# Patient Record
Sex: MALE | Race: WHITE | NOT HISPANIC OR LATINO | Employment: OTHER | URBAN - METROPOLITAN AREA
[De-identification: names, ages, dates, MRNs, and addresses within clinical notes are randomized per-mention and may not be internally consistent; named-entity substitution may affect disease eponyms.]

---

## 2017-02-20 ENCOUNTER — HISTORICAL (OUTPATIENT)
Dept: RADIOLOGY | Facility: HOSPITAL | Age: 72
End: 2017-02-20

## 2017-07-10 ENCOUNTER — HISTORICAL (OUTPATIENT)
Dept: LAB | Facility: HOSPITAL | Age: 72
End: 2017-07-10

## 2017-07-10 LAB
ALBUMIN SERPL-MCNC: 3.6 GM/DL (ref 3.4–5)
ALP SERPL-CCNC: 76 UNIT/L (ref 46–116)
ALT SERPL-CCNC: 33 UNIT/L (ref 12–78)
AST SERPL-CCNC: 19 UNIT/L (ref 15–37)
BILIRUB SERPL-MCNC: 0.9 MG/DL (ref 0.2–1)
BILIRUBIN DIRECT+TOT PNL SERPL-MCNC: 0.19 MG/DL (ref 0–0.2)
BILIRUBIN DIRECT+TOT PNL SERPL-MCNC: 0.71 MG/DL (ref 0–0.8)
CHOLEST SERPL-MCNC: 122 MG/DL (ref 0–200)
CHOLEST/HDLC SERPL: 3.8 {RATIO} (ref 0–5)
HDLC SERPL-MCNC: 32 MG/DL (ref 40–60)
LDLC SERPL CALC-MCNC: 52 MG/DL (ref 0–129)
PROT SERPL-MCNC: 7 GM/DL (ref 6.4–8.2)
TRIGL SERPL-MCNC: 192 MG/DL
VLDLC SERPL CALC-MCNC: 38 MG/DL

## 2017-08-28 ENCOUNTER — HISTORICAL (OUTPATIENT)
Dept: LAB | Facility: HOSPITAL | Age: 72
End: 2017-08-28

## 2017-08-28 LAB — PSA SERPL-MCNC: 0.46 NG/ML (ref 0–4)

## 2017-09-08 ENCOUNTER — HISTORICAL (OUTPATIENT)
Dept: RADIOLOGY | Facility: HOSPITAL | Age: 72
End: 2017-09-08

## 2017-11-02 ENCOUNTER — HISTORICAL (OUTPATIENT)
Dept: LAB | Facility: HOSPITAL | Age: 72
End: 2017-11-02

## 2017-11-10 ENCOUNTER — HISTORICAL (OUTPATIENT)
Dept: CARDIOLOGY | Facility: HOSPITAL | Age: 72
End: 2017-11-10

## 2017-11-13 ENCOUNTER — HISTORICAL (OUTPATIENT)
Dept: RESPIRATORY THERAPY | Facility: HOSPITAL | Age: 72
End: 2017-11-13

## 2017-11-20 ENCOUNTER — HISTORICAL (OUTPATIENT)
Dept: LAB | Facility: HOSPITAL | Age: 72
End: 2017-11-20

## 2017-11-20 LAB
ALBUMIN SERPL-MCNC: 3.8 GM/DL (ref 3.4–5)
ALP SERPL-CCNC: 69 UNIT/L (ref 46–116)
ALT SERPL-CCNC: 32 UNIT/L (ref 12–78)
AST SERPL-CCNC: 15 UNIT/L (ref 15–37)
BILIRUB SERPL-MCNC: 1.5 MG/DL (ref 0.2–1)
BILIRUBIN DIRECT+TOT PNL SERPL-MCNC: 0.28 MG/DL (ref 0–0.2)
BILIRUBIN DIRECT+TOT PNL SERPL-MCNC: 1.25 MG/DL (ref 0–0.8)
BUN SERPL-MCNC: 13.6 MG/DL (ref 7–18)
CALCIUM SERPL-MCNC: 9.5 MG/DL (ref 8.5–10.1)
CHLORIDE SERPL-SCNC: 102 MMOL/L (ref 98–107)
CHOLEST SERPL-MCNC: 121 MG/DL (ref 0–200)
CHOLEST/HDLC SERPL: 3.2 {RATIO} (ref 0–5)
CO2 SERPL-SCNC: 30.2 MMOL/L (ref 21–32)
CREAT SERPL-MCNC: 0.65 MG/DL (ref 0.6–1.3)
ERYTHROCYTE [DISTWIDTH] IN BLOOD BY AUTOMATED COUNT: 13.8 % (ref 11.5–17)
FT4I SERPL CALC-MCNC: 2.94
GLUCOSE SERPL-MCNC: 117 MG/DL (ref 74–106)
HCT VFR BLD AUTO: 46.2 % (ref 42–52)
HDLC SERPL-MCNC: 38 MG/DL (ref 40–60)
HGB BLD-MCNC: 15.2 GM/DL (ref 14–18)
LDLC SERPL CALC-MCNC: 35 MG/DL (ref 0–129)
MCH RBC QN AUTO: 31.7 PG (ref 27–31)
MCHC RBC AUTO-ENTMCNC: 32.8 GM/DL (ref 33–36)
MCV RBC AUTO: 96.9 FL (ref 80–94)
PLATELET # BLD AUTO: 179 X10(3)/MCL (ref 130–400)
PMV BLD AUTO: 7.1 FL (ref 7.4–10.4)
POTASSIUM SERPL-SCNC: 4.4 MMOL/L (ref 3.5–5.1)
PROT SERPL-MCNC: 7.1 GM/DL (ref 6.4–8.2)
RBC # BLD AUTO: 4.77 X10(6)/MCL (ref 4.7–6.1)
SODIUM SERPL-SCNC: 140 MMOL/L (ref 136–145)
T3RU NFR SERPL: 33 % (ref 31–39)
T4 SERPL-MCNC: 8.9 MCG/DL (ref 4.7–13.3)
TRIGL SERPL-MCNC: 240 MG/DL
TSH SERPL-ACNC: 5.19 MIU/ML (ref 0.36–3.74)
VLDLC SERPL CALC-MCNC: 48 MG/DL
WBC # SPEC AUTO: 7.9 X10(3)/MCL (ref 4.5–11.5)

## 2017-12-12 ENCOUNTER — HISTORICAL (OUTPATIENT)
Dept: LAB | Facility: HOSPITAL | Age: 72
End: 2017-12-12

## 2017-12-12 LAB
BUN SERPL-MCNC: 12.5 MG/DL (ref 7–18)
CALCIUM SERPL-MCNC: 8.8 MG/DL (ref 8.5–10.1)
CHLORIDE SERPL-SCNC: 103 MMOL/L (ref 98–107)
CO2 SERPL-SCNC: 31.7 MMOL/L (ref 21–32)
CREAT SERPL-MCNC: 0.68 MG/DL (ref 0.6–1.3)
GLUCOSE SERPL-MCNC: 105 MG/DL (ref 74–106)
POTASSIUM SERPL-SCNC: 4.4 MMOL/L (ref 3.5–5.1)
SODIUM SERPL-SCNC: 142 MMOL/L (ref 136–145)

## 2018-03-07 ENCOUNTER — HISTORICAL (OUTPATIENT)
Dept: LAB | Facility: HOSPITAL | Age: 73
End: 2018-03-07

## 2018-03-07 LAB
ALBUMIN SERPL-MCNC: 3.7 GM/DL (ref 3.4–5)
ALP SERPL-CCNC: 72 UNIT/L (ref 46–116)
ALT SERPL-CCNC: 27 UNIT/L (ref 12–78)
AST SERPL-CCNC: 15 UNIT/L (ref 15–37)
BILIRUB SERPL-MCNC: 1.3 MG/DL (ref 0.2–1)
BILIRUBIN DIRECT+TOT PNL SERPL-MCNC: 0.26 MG/DL (ref 0–0.2)
BILIRUBIN DIRECT+TOT PNL SERPL-MCNC: 1.06 MG/DL (ref 0–0.8)
CHOLEST SERPL-MCNC: 111 MG/DL (ref 0–200)
CHOLEST/HDLC SERPL: 3.4 {RATIO} (ref 0–5)
HDLC SERPL-MCNC: 33 MG/DL (ref 40–60)
LDLC SERPL CALC-MCNC: 35 MG/DL (ref 0–129)
PROT SERPL-MCNC: 7 GM/DL (ref 6.4–8.2)
TRIGL SERPL-MCNC: 214 MG/DL
VLDLC SERPL CALC-MCNC: 43 MG/DL

## 2019-06-18 ENCOUNTER — HISTORICAL (OUTPATIENT)
Dept: LAB | Facility: HOSPITAL | Age: 74
End: 2019-06-18

## 2019-06-18 LAB
ALBUMIN SERPL-MCNC: 3.5 GM/DL (ref 3.4–5)
ALP SERPL-CCNC: 75 UNIT/L (ref 46–116)
ALT SERPL-CCNC: 28 UNIT/L (ref 12–78)
AST SERPL-CCNC: 16 UNIT/L (ref 15–37)
BILIRUB SERPL-MCNC: 1.3 MG/DL (ref 0.2–1)
BILIRUBIN DIRECT+TOT PNL SERPL-MCNC: 0.24 MG/DL (ref 0–0.2)
BILIRUBIN DIRECT+TOT PNL SERPL-MCNC: 1.06 MG/DL (ref 0–0.8)
BUN SERPL-MCNC: 13.8 MG/DL (ref 7–18)
CALCIUM SERPL-MCNC: 8.9 MG/DL (ref 8.5–10.1)
CHLORIDE SERPL-SCNC: 104 MMOL/L (ref 98–107)
CHOLEST SERPL-MCNC: 118 MG/DL (ref 0–200)
CHOLEST/HDLC SERPL: 3.9 {RATIO} (ref 0–5)
CO2 SERPL-SCNC: 32.1 MMOL/L (ref 21–32)
CREAT SERPL-MCNC: 0.69 MG/DL (ref 0.6–1.3)
CREAT/UREA NIT SERPL: 20
ERYTHROCYTE [DISTWIDTH] IN BLOOD BY AUTOMATED COUNT: 13.6 % (ref 11.5–17)
GLUCOSE SERPL-MCNC: 114 MG/DL (ref 74–106)
HCT VFR BLD AUTO: 43.7 % (ref 42–52)
HDLC SERPL-MCNC: 30 MG/DL (ref 40–60)
HGB BLD-MCNC: 15 GM/DL (ref 14–18)
LDLC SERPL CALC-MCNC: 36 MG/DL (ref 0–129)
MCH RBC QN AUTO: 32.2 PG (ref 27–31)
MCHC RBC AUTO-ENTMCNC: 34.3 GM/DL (ref 33–36)
MCV RBC AUTO: 93.8 FL (ref 80–94)
PLATELET # BLD AUTO: 164 X10(3)/MCL (ref 130–400)
PMV BLD AUTO: 8.8 FL (ref 9.4–12.4)
POTASSIUM SERPL-SCNC: 4.2 MMOL/L (ref 3.5–5.1)
PROT SERPL-MCNC: 6.9 GM/DL (ref 6.4–8.2)
RBC # BLD AUTO: 4.66 X10(6)/MCL (ref 4.7–6.1)
SODIUM SERPL-SCNC: 142 MMOL/L (ref 136–145)
TRIGL SERPL-MCNC: 262 MG/DL
VLDLC SERPL CALC-MCNC: 52 MG/DL
WBC # SPEC AUTO: 6.6 X10(3)/MCL (ref 4.5–11.5)

## 2019-09-18 ENCOUNTER — HISTORICAL (OUTPATIENT)
Dept: RADIOLOGY | Facility: HOSPITAL | Age: 74
End: 2019-09-18

## 2019-12-16 ENCOUNTER — HISTORICAL (OUTPATIENT)
Dept: LAB | Facility: HOSPITAL | Age: 74
End: 2019-12-16

## 2019-12-16 LAB
ABS NEUT (OLG): 5.49 X10(3)/MCL (ref 2.1–9.2)
ALBUMIN SERPL-MCNC: 3.7 GM/DL (ref 3.4–5)
ALP SERPL-CCNC: 78 UNIT/L (ref 46–116)
ALT SERPL-CCNC: 30 UNIT/L (ref 12–78)
AST SERPL-CCNC: 19 UNIT/L (ref 15–37)
BASOPHILS # BLD AUTO: 0.1 X10(3)/MCL (ref 0–0.2)
BASOPHILS NFR BLD AUTO: 1 %
BILIRUB SERPL-MCNC: 1.5 MG/DL (ref 0.2–1)
BILIRUBIN DIRECT+TOT PNL SERPL-MCNC: 0.32 MG/DL (ref 0–0.2)
BILIRUBIN DIRECT+TOT PNL SERPL-MCNC: 1.18 MG/DL (ref 0–0.8)
BUN SERPL-MCNC: 12.6 MG/DL (ref 7–18)
CALCIUM SERPL-MCNC: 8.3 MG/DL (ref 8.5–10.1)
CHLORIDE SERPL-SCNC: 106 MMOL/L (ref 98–107)
CHOLEST SERPL-MCNC: 115 MG/DL (ref 0–200)
CHOLEST/HDLC SERPL: 4.1 {RATIO} (ref 0–5)
CO2 SERPL-SCNC: 31 MMOL/L (ref 21–32)
CREAT SERPL-MCNC: 0.71 MG/DL (ref 0.6–1.3)
CREAT/UREA NIT SERPL: 18
EOSINOPHIL # BLD AUTO: 0.2 X10(3)/MCL (ref 0–0.9)
EOSINOPHIL NFR BLD AUTO: 2 %
ERYTHROCYTE [DISTWIDTH] IN BLOOD BY AUTOMATED COUNT: 13.5 % (ref 11.5–17)
EST. AVERAGE GLUCOSE BLD GHB EST-MCNC: 114 MG/DL
FT4I SERPL CALC-MCNC: 2.82
GLUCOSE SERPL-MCNC: 117 MG/DL (ref 74–106)
HBA1C MFR BLD: 5.6 % (ref 4.5–6.2)
HCT VFR BLD AUTO: 47.3 % (ref 42–52)
HDLC SERPL-MCNC: 28 MG/DL (ref 40–60)
HGB BLD-MCNC: 15.3 GM/DL (ref 14–18)
IMM GRANULOCYTES # BLD AUTO: 0.01 % (ref 0–0.02)
IMM GRANULOCYTES NFR BLD AUTO: 0.1 % (ref 0–0.43)
LDLC SERPL CALC-MCNC: 39 MG/DL (ref 0–129)
LYMPHOCYTES # BLD AUTO: 1.7 X10(3)/MCL (ref 0.6–4.6)
LYMPHOCYTES NFR BLD AUTO: 21 %
MCH RBC QN AUTO: 31.9 PG (ref 27–31)
MCHC RBC AUTO-ENTMCNC: 32.3 GM/DL (ref 33–36)
MCV RBC AUTO: 98.7 FL (ref 80–94)
MONOCYTES # BLD AUTO: 0.6 X10(3)/MCL (ref 0.1–1.3)
MONOCYTES NFR BLD AUTO: 8 %
NEUTROPHILS # BLD AUTO: 5.49 X10(3)/MCL (ref 1.4–7.9)
NEUTROPHILS NFR BLD AUTO: 68 %
PLATELET # BLD AUTO: 180 X10(3)/MCL (ref 130–400)
PMV BLD AUTO: 9.2 FL (ref 9.4–12.4)
POTASSIUM SERPL-SCNC: 3.9 MMOL/L (ref 3.5–5.1)
PROT SERPL-MCNC: 6.9 GM/DL (ref 6.4–8.2)
PSA SERPL-MCNC: 0.6 NG/ML (ref 0–4)
RBC # BLD AUTO: 4.79 X10(6)/MCL (ref 4.7–6.1)
SODIUM SERPL-SCNC: 143 MMOL/L (ref 136–145)
T3RU NFR SERPL: 34 % (ref 31–39)
T4 SERPL-MCNC: 8.3 MCG/DL (ref 4.7–13.3)
TRIGL SERPL-MCNC: 240 MG/DL
TSH SERPL-ACNC: 6.29 MIU/ML (ref 0.36–3.74)
VLDLC SERPL CALC-MCNC: 48 MG/DL
WBC # SPEC AUTO: 8.1 X10(3)/MCL (ref 4.5–11.5)

## 2020-03-13 ENCOUNTER — HISTORICAL (OUTPATIENT)
Dept: LAB | Facility: HOSPITAL | Age: 75
End: 2020-03-13

## 2020-03-13 LAB
FT4I SERPL CALC-MCNC: 2.82
T3RU NFR SERPL: 32 % (ref 31–39)
T4 SERPL-MCNC: 8.8 MCG/DL (ref 4.7–13.3)
TSH SERPL-ACNC: 2.68 MIU/ML (ref 0.36–3.74)

## 2020-04-30 ENCOUNTER — HISTORICAL (OUTPATIENT)
Dept: ADMINISTRATIVE | Facility: HOSPITAL | Age: 75
End: 2020-04-30

## 2020-04-30 LAB
ABS NEUT (OLG): 4.96 X10(3)/MCL (ref 2.1–9.2)
BASOPHILS # BLD AUTO: 0 X10(3)/MCL (ref 0–0.2)
BASOPHILS NFR BLD AUTO: 1 %
BUN SERPL-MCNC: 11 MG/DL (ref 8.4–25.7)
CALCIUM SERPL-MCNC: 8.5 MG/DL (ref 8.8–10)
CHLORIDE SERPL-SCNC: 107 MMOL/L (ref 98–107)
CO2 SERPL-SCNC: 26 MMOL/L (ref 23–31)
CREAT SERPL-MCNC: 0.61 MG/DL (ref 0.73–1.18)
CREAT/UREA NIT SERPL: 18
EOSINOPHIL # BLD AUTO: 0.1 X10(3)/MCL (ref 0–0.9)
EOSINOPHIL NFR BLD AUTO: 2 %
ERYTHROCYTE [DISTWIDTH] IN BLOOD BY AUTOMATED COUNT: 14.3 % (ref 11.5–17)
GLUCOSE SERPL-MCNC: 116 MG/DL (ref 82–115)
HCT VFR BLD AUTO: 48.9 % (ref 42–52)
HGB BLD-MCNC: 15.3 GM/DL (ref 14–18)
INR PPP: 1.2 (ref 0–1.3)
LYMPHOCYTES # BLD AUTO: 1.3 X10(3)/MCL (ref 0.6–4.6)
LYMPHOCYTES NFR BLD AUTO: 18 %
MAGNESIUM SERPL-MCNC: 2.27 MG/DL (ref 1.6–2.6)
MCH RBC QN AUTO: 31.2 PG (ref 27–31)
MCHC RBC AUTO-ENTMCNC: 31.3 GM/DL (ref 33–36)
MCV RBC AUTO: 99.8 FL (ref 80–94)
MONOCYTES # BLD AUTO: 0.6 X10(3)/MCL (ref 0.1–1.3)
MONOCYTES NFR BLD AUTO: 9 %
NEUTROPHILS # BLD AUTO: 4.96 X10(3)/MCL (ref 2.1–9.2)
NEUTROPHILS NFR BLD AUTO: 70 %
PLATELET # BLD AUTO: 179 X10(3)/MCL (ref 130–400)
PMV BLD AUTO: 9.4 FL (ref 9.4–12.4)
POTASSIUM SERPL-SCNC: 4.1 MMOL/L (ref 3.5–5.1)
PROTHROMBIN TIME: 14.8 SECOND(S) (ref 11.1–13.7)
RBC # BLD AUTO: 4.9 X10(6)/MCL (ref 4.7–6.1)
SODIUM SERPL-SCNC: 139 MMOL/L (ref 136–145)
WBC # SPEC AUTO: 7.1 X10(3)/MCL (ref 4.5–11.5)

## 2020-06-16 ENCOUNTER — HISTORICAL (OUTPATIENT)
Dept: LAB | Facility: HOSPITAL | Age: 75
End: 2020-06-16

## 2020-06-16 LAB
ALBUMIN SERPL-MCNC: 3.6 GM/DL (ref 3.4–5)
ALP SERPL-CCNC: 61 UNIT/L (ref 46–116)
ALT SERPL-CCNC: 26 UNIT/L (ref 12–78)
AST SERPL-CCNC: 21 UNIT/L (ref 15–37)
BILIRUB SERPL-MCNC: 1.2 MG/DL (ref 0.2–1)
BILIRUBIN DIRECT+TOT PNL SERPL-MCNC: 0.23 MG/DL (ref 0–0.2)
BILIRUBIN DIRECT+TOT PNL SERPL-MCNC: 0.97 MG/DL (ref 0–0.8)
CHOLEST SERPL-MCNC: 112 MG/DL (ref 0–200)
CHOLEST/HDLC SERPL: 4.1 {RATIO} (ref 0–5)
ERYTHROCYTE [DISTWIDTH] IN BLOOD BY AUTOMATED COUNT: 13.8 % (ref 11.5–17)
HCT VFR BLD AUTO: 46.2 % (ref 42–52)
HDLC SERPL-MCNC: 27 MG/DL (ref 40–60)
HGB BLD-MCNC: 14.9 GM/DL (ref 14–18)
LDLC SERPL CALC-MCNC: 31 MG/DL (ref 0–129)
MCH RBC QN AUTO: 31.7 PG (ref 27–31)
MCHC RBC AUTO-ENTMCNC: 32.3 GM/DL (ref 33–36)
MCV RBC AUTO: 98.3 FL (ref 80–94)
PLATELET # BLD AUTO: 177 X10(3)/MCL (ref 130–400)
PMV BLD AUTO: 9.3 FL (ref 9.4–12.4)
PROT SERPL-MCNC: 7.2 GM/DL (ref 6.4–8.2)
RBC # BLD AUTO: 4.7 X10(6)/MCL (ref 4.7–6.1)
TRIGL SERPL-MCNC: 271 MG/DL
VLDLC SERPL CALC-MCNC: 54 MG/DL
WBC # SPEC AUTO: 7.2 X10(3)/MCL (ref 4.5–11.5)

## 2021-01-21 ENCOUNTER — HISTORICAL (OUTPATIENT)
Dept: LAB | Facility: HOSPITAL | Age: 76
End: 2021-01-21

## 2021-01-21 LAB
BUN SERPL-MCNC: 10.1 MG/DL (ref 8.4–25.7)
CALCIUM SERPL-MCNC: 8.4 MG/DL (ref 8.8–10)
CHLORIDE SERPL-SCNC: 99 MMOL/L (ref 98–107)
CO2 SERPL-SCNC: 25 MMOL/L (ref 23–31)
CREAT SERPL-MCNC: 0.7 MG/DL (ref 0.73–1.18)
CREAT/UREA NIT SERPL: 14
ERYTHROCYTE [DISTWIDTH] IN BLOOD BY AUTOMATED COUNT: 14.2 % (ref 11.5–17)
GLUCOSE SERPL-MCNC: 160 MG/DL (ref 82–115)
HCT VFR BLD AUTO: 47.9 % (ref 42–52)
HGB BLD-MCNC: 15.2 GM/DL (ref 14–18)
INR PPP: 1 (ref 0–1.3)
MAGNESIUM SERPL-MCNC: 2.1 MG/DL (ref 1.6–2.6)
MCH RBC QN AUTO: 32.8 PG (ref 27–31)
MCHC RBC AUTO-ENTMCNC: 31.7 GM/DL (ref 33–36)
MCV RBC AUTO: 103.2 FL (ref 80–94)
PLATELET # BLD AUTO: 198 X10(3)/MCL (ref 130–400)
PMV BLD AUTO: 9.8 FL (ref 9.4–12.4)
POTASSIUM SERPL-SCNC: 3.6 MMOL/L (ref 3.5–5.1)
PROTHROMBIN TIME: 12.9 SECOND(S) (ref 11.1–13.7)
RBC # BLD AUTO: 4.64 X10(6)/MCL (ref 4.7–6.1)
SODIUM SERPL-SCNC: 133 MMOL/L (ref 136–145)
WBC # SPEC AUTO: 6.9 X10(3)/MCL (ref 4.5–11.5)

## 2021-02-01 ENCOUNTER — HISTORICAL (OUTPATIENT)
Dept: MEDSURG UNIT | Facility: HOSPITAL | Age: 76
End: 2021-02-01

## 2021-02-01 LAB
BUN SERPL-MCNC: 11.2 MG/DL (ref 8.4–25.7)
CALCIUM SERPL-MCNC: 8.6 MG/DL (ref 8.8–10)
CHLORIDE SERPL-SCNC: 106 MMOL/L (ref 98–107)
CO2 SERPL-SCNC: 28 MMOL/L (ref 23–31)
CREAT SERPL-MCNC: 0.66 MG/DL (ref 0.73–1.18)
CREAT/UREA NIT SERPL: 17
GLUCOSE SERPL-MCNC: 115 MG/DL (ref 82–115)
POTASSIUM SERPL-SCNC: 4.3 MMOL/L (ref 3.5–5.1)
SODIUM SERPL-SCNC: 141 MMOL/L (ref 136–145)

## 2021-02-02 LAB
ABS NEUT (OLG): 10.12 X10(3)/MCL (ref 2.1–9.2)
ALBUMIN SERPL-MCNC: 3.3 GM/DL (ref 3.4–4.8)
ALBUMIN/GLOB SERPL: 1.2 RATIO (ref 1.1–2)
ALP SERPL-CCNC: 49 UNIT/L (ref 40–150)
ALT SERPL-CCNC: 15 UNIT/L (ref 0–55)
AST SERPL-CCNC: 25 UNIT/L (ref 5–34)
BASOPHILS # BLD AUTO: 0 X10(3)/MCL (ref 0–0.2)
BASOPHILS NFR BLD AUTO: 0 %
BILIRUB SERPL-MCNC: 1.6 MG/DL
BILIRUBIN DIRECT+TOT PNL SERPL-MCNC: 0.6 MG/DL (ref 0–0.5)
BILIRUBIN DIRECT+TOT PNL SERPL-MCNC: 1 MG/DL (ref 0–0.8)
BUN SERPL-MCNC: 13.3 MG/DL (ref 8.4–25.7)
CALCIUM SERPL-MCNC: 7.5 MG/DL (ref 8.8–10)
CHLORIDE SERPL-SCNC: 105 MMOL/L (ref 98–107)
CO2 SERPL-SCNC: 30 MMOL/L (ref 23–31)
CREAT SERPL-MCNC: 0.76 MG/DL (ref 0.73–1.18)
ERYTHROCYTE [DISTWIDTH] IN BLOOD BY AUTOMATED COUNT: 14.8 % (ref 11.5–17)
GLOBULIN SER-MCNC: 2.7 GM/DL (ref 2.4–3.5)
GLUCOSE SERPL-MCNC: 110 MG/DL (ref 82–115)
HCT VFR BLD AUTO: 42.4 % (ref 42–52)
HGB BLD-MCNC: 13.7 GM/DL (ref 14–18)
LYMPHOCYTES # BLD AUTO: 0.9 X10(3)/MCL (ref 0.6–4.6)
LYMPHOCYTES NFR BLD AUTO: 8 %
MCH RBC QN AUTO: 32.8 PG (ref 27–31)
MCHC RBC AUTO-ENTMCNC: 32.3 GM/DL (ref 33–36)
MCV RBC AUTO: 101.4 FL (ref 80–94)
MONOCYTES # BLD AUTO: 0.7 X10(3)/MCL (ref 0.1–1.3)
MONOCYTES NFR BLD AUTO: 6 %
NEUTROPHILS # BLD AUTO: 10.12 X10(3)/MCL (ref 2.1–9.2)
NEUTROPHILS NFR BLD AUTO: 86 %
PLATELET # BLD AUTO: 182 X10(3)/MCL (ref 130–400)
PMV BLD AUTO: 9.7 FL (ref 9.4–12.4)
POTASSIUM SERPL-SCNC: 4.4 MMOL/L (ref 3.5–5.1)
PROT SERPL-MCNC: 6 GM/DL (ref 5.8–7.6)
RBC # BLD AUTO: 4.18 X10(6)/MCL (ref 4.7–6.1)
SODIUM SERPL-SCNC: 142 MMOL/L (ref 136–145)
WBC # SPEC AUTO: 11.8 X10(3)/MCL (ref 4.5–11.5)

## 2021-02-22 ENCOUNTER — HISTORICAL (OUTPATIENT)
Dept: LAB | Facility: HOSPITAL | Age: 76
End: 2021-02-22

## 2021-02-22 LAB
BUN SERPL-MCNC: 12 MG/DL (ref 8.4–25.7)
CALCIUM SERPL-MCNC: 8.6 MG/DL (ref 8.8–10)
CHLORIDE SERPL-SCNC: 104 MMOL/L (ref 98–107)
CO2 SERPL-SCNC: 29 MMOL/L (ref 23–31)
CREAT SERPL-MCNC: 0.65 MG/DL (ref 0.73–1.18)
CREAT/UREA NIT SERPL: 18
ERYTHROCYTE [DISTWIDTH] IN BLOOD BY AUTOMATED COUNT: 13.7 % (ref 11.5–17)
GLUCOSE SERPL-MCNC: 113 MG/DL (ref 82–115)
HCT VFR BLD AUTO: 45.8 % (ref 42–52)
HGB BLD-MCNC: 14.5 GM/DL (ref 14–18)
MCH RBC QN AUTO: 32.4 PG (ref 27–31)
MCHC RBC AUTO-ENTMCNC: 31.7 GM/DL (ref 33–36)
MCV RBC AUTO: 102.2 FL (ref 80–94)
PLATELET # BLD AUTO: 159 X10(3)/MCL (ref 130–400)
PMV BLD AUTO: 9.3 FL (ref 9.4–12.4)
POTASSIUM SERPL-SCNC: 4.1 MMOL/L (ref 3.5–5.1)
RBC # BLD AUTO: 4.48 X10(6)/MCL (ref 4.7–6.1)
SODIUM SERPL-SCNC: 142 MMOL/L (ref 136–145)
WBC # SPEC AUTO: 6.9 X10(3)/MCL (ref 4.5–11.5)

## 2021-03-03 ENCOUNTER — HISTORICAL (OUTPATIENT)
Dept: LAB | Facility: HOSPITAL | Age: 76
End: 2021-03-03

## 2021-03-03 LAB
ABS NEUT (OLG): 5.25 X10(3)/MCL (ref 2.1–9.2)
ALBUMIN SERPL-MCNC: 3.6 GM/DL (ref 3.4–4.8)
ALP SERPL-CCNC: 65 UNIT/L (ref 40–150)
ALT SERPL-CCNC: 15 UNIT/L (ref 0–55)
AST SERPL-CCNC: 12 UNIT/L (ref 5–34)
BASOPHILS # BLD AUTO: 0 X10(3)/MCL (ref 0–0.2)
BASOPHILS NFR BLD AUTO: 0 %
BILIRUB SERPL-MCNC: 1.4 MG/DL
BILIRUBIN DIRECT+TOT PNL SERPL-MCNC: 0.4 MG/DL (ref 0–0.5)
BILIRUBIN DIRECT+TOT PNL SERPL-MCNC: 1 MG/DL (ref 0–0.8)
BUN SERPL-MCNC: 13.8 MG/DL (ref 8.4–25.7)
CALCIUM SERPL-MCNC: 8.9 MG/DL (ref 8.8–10)
CHLORIDE SERPL-SCNC: 104 MMOL/L (ref 98–107)
CHOLEST SERPL-MCNC: 91 MG/DL
CHOLEST/HDLC SERPL: 4 {RATIO} (ref 0–5)
CO2 SERPL-SCNC: 29 MMOL/L (ref 23–31)
CREAT SERPL-MCNC: 0.7 MG/DL (ref 0.73–1.18)
CREAT/UREA NIT SERPL: 20
EOSINOPHIL # BLD AUTO: 0.2 X10(3)/MCL (ref 0–0.9)
EOSINOPHIL NFR BLD AUTO: 3 %
ERYTHROCYTE [DISTWIDTH] IN BLOOD BY AUTOMATED COUNT: 14 % (ref 11.5–17)
FT4I SERPL CALC-MCNC: 2.47 (ref 2.6–3.6)
GLUCOSE SERPL-MCNC: 111 MG/DL (ref 82–115)
HCT VFR BLD AUTO: 44.8 % (ref 42–52)
HDLC SERPL-MCNC: 24 MG/DL (ref 35–60)
HGB BLD-MCNC: 13.8 GM/DL (ref 14–18)
IMM GRANULOCYTES # BLD AUTO: 0.01 % (ref 0–0.02)
IMM GRANULOCYTES NFR BLD AUTO: 0.1 % (ref 0–0.43)
LDLC SERPL CALC-MCNC: 32 MG/DL (ref 50–140)
LYMPHOCYTES # BLD AUTO: 1.2 X10(3)/MCL (ref 0.6–4.6)
LYMPHOCYTES NFR BLD AUTO: 16 %
MCH RBC QN AUTO: 32 PG (ref 27–31)
MCHC RBC AUTO-ENTMCNC: 30.8 GM/DL (ref 33–36)
MCV RBC AUTO: 103.9 FL (ref 80–94)
MONOCYTES # BLD AUTO: 0.6 X10(3)/MCL (ref 0.1–1.3)
MONOCYTES NFR BLD AUTO: 8 %
NEUTROPHILS # BLD AUTO: 5.25 X10(3)/MCL (ref 1.4–7.9)
NEUTROPHILS NFR BLD AUTO: 72 %
PLATELET # BLD AUTO: 166 X10(3)/MCL (ref 130–400)
PMV BLD AUTO: 9.4 FL (ref 9.4–12.4)
POTASSIUM SERPL-SCNC: 4.7 MMOL/L (ref 3.5–5.1)
PROT SERPL-MCNC: 6.6 GM/DL (ref 5.8–7.6)
RBC # BLD AUTO: 4.31 X10(6)/MCL (ref 4.7–6.1)
SODIUM SERPL-SCNC: 141 MMOL/L (ref 136–145)
T3RU NFR SERPL: 37 % (ref 31–39)
T4 SERPL-MCNC: 6.67 UG/DL (ref 4.87–11.72)
TRIGL SERPL-MCNC: 173 MG/DL (ref 34–140)
TSH SERPL-ACNC: 6.33 UIU/ML (ref 0.35–4.94)
VLDLC SERPL CALC-MCNC: 35 MG/DL
WBC # SPEC AUTO: 7.3 X10(3)/MCL (ref 4.5–11.5)

## 2021-04-19 ENCOUNTER — HISTORICAL (OUTPATIENT)
Dept: LAB | Facility: HOSPITAL | Age: 76
End: 2021-04-19

## 2021-04-19 LAB
ALBUMIN SERPL-MCNC: 3.7 GM/DL (ref 3.4–4.8)
ALP SERPL-CCNC: 64 UNIT/L (ref 40–150)
ALT SERPL-CCNC: 17 UNIT/L (ref 0–55)
AST SERPL-CCNC: 19 UNIT/L (ref 5–34)
BILIRUB SERPL-MCNC: 1.8 MG/DL
BILIRUBIN DIRECT+TOT PNL SERPL-MCNC: 0.7 MG/DL (ref 0–0.5)
BILIRUBIN DIRECT+TOT PNL SERPL-MCNC: 1.1 MG/DL (ref 0–0.8)
BUN SERPL-MCNC: 11.9 MG/DL (ref 8.4–25.7)
CALCIUM SERPL-MCNC: 9 MG/DL (ref 8.8–10)
CHLORIDE SERPL-SCNC: 107 MMOL/L (ref 98–107)
CHOLEST SERPL-MCNC: 91 MG/DL
CHOLEST/HDLC SERPL: 3 {RATIO} (ref 0–5)
CO2 SERPL-SCNC: 26 MMOL/L (ref 23–31)
CREAT SERPL-MCNC: 0.62 MG/DL (ref 0.73–1.18)
CREAT/UREA NIT SERPL: 19
GLUCOSE SERPL-MCNC: 136 MG/DL (ref 82–115)
HDLC SERPL-MCNC: 27 MG/DL (ref 35–60)
LDLC SERPL CALC-MCNC: 39 MG/DL (ref 50–140)
POTASSIUM SERPL-SCNC: 3.9 MMOL/L (ref 3.5–5.1)
PROT SERPL-MCNC: 6.8 GM/DL (ref 5.8–7.6)
SODIUM SERPL-SCNC: 142 MMOL/L (ref 136–145)
TRIGL SERPL-MCNC: 126 MG/DL (ref 34–140)
VLDLC SERPL CALC-MCNC: 25 MG/DL

## 2021-05-12 ENCOUNTER — HISTORICAL (OUTPATIENT)
Dept: LAB | Facility: HOSPITAL | Age: 76
End: 2021-05-12

## 2021-05-12 LAB
FT4I SERPL CALC-MCNC: 2.74 (ref 2.6–3.6)
T3RU NFR SERPL: 37.1 % (ref 31–39)
T4 SERPL-MCNC: 7.39 UG/DL (ref 4.87–11.72)
TSH SERPL-ACNC: 3.15 UIU/ML (ref 0.35–4.94)

## 2021-10-30 ENCOUNTER — HISTORICAL (OUTPATIENT)
Dept: LAB | Facility: HOSPITAL | Age: 76
End: 2021-10-30

## 2021-10-30 LAB
ALBUMIN SERPL-MCNC: 3.8 GM/DL (ref 3.4–4.8)
ALP SERPL-CCNC: 66 UNIT/L (ref 40–150)
ALT SERPL-CCNC: 19 UNIT/L (ref 0–55)
AST SERPL-CCNC: 16 UNIT/L (ref 5–34)
BILIRUB SERPL-MCNC: 1.9 MG/DL
BILIRUBIN DIRECT+TOT PNL SERPL-MCNC: 0.6 MG/DL (ref 0–0.5)
BILIRUBIN DIRECT+TOT PNL SERPL-MCNC: 1.3 MG/DL (ref 0–0.8)
CHOLEST SERPL-MCNC: 111 MG/DL
CHOLEST/HDLC SERPL: 4 {RATIO} (ref 0–5)
HDLC SERPL-MCNC: 27 MG/DL (ref 35–60)
LDLC SERPL CALC-MCNC: 36 MG/DL (ref 50–140)
PROT SERPL-MCNC: 6.7 GM/DL (ref 5.8–7.6)
TRIGL SERPL-MCNC: 239 MG/DL (ref 34–140)
VLDLC SERPL CALC-MCNC: 48 MG/DL

## 2021-11-19 ENCOUNTER — HISTORICAL (OUTPATIENT)
Dept: LAB | Facility: HOSPITAL | Age: 76
End: 2021-11-19

## 2021-11-19 LAB
FT4I SERPL CALC-MCNC: 3.44 (ref 2.6–3.6)
T3RU NFR SERPL: 43.48 % (ref 31–39)
T4 SERPL-MCNC: 7.91 UG/DL (ref 4.87–11.72)
TSH SERPL-ACNC: 3.48 UIU/ML (ref 0.35–4.94)

## 2021-12-05 ENCOUNTER — HOSPITAL ENCOUNTER (OUTPATIENT)
Dept: MEDSURG UNIT | Facility: HOSPITAL | Age: 76
End: 2021-12-07
Attending: INTERNAL MEDICINE | Admitting: INTERNAL MEDICINE

## 2021-12-05 LAB — LACTATE SERPL-SCNC: 1.4 MMOL/L (ref 0.5–2.2)

## 2021-12-06 LAB
ABS NEUT (OLG): 20.4 X10(3)/MCL (ref 2.1–9.2)
BASOPHILS # BLD AUTO: 0 X10(3)/MCL (ref 0–0.2)
BUN SERPL-MCNC: 13.6 MG/DL (ref 8.4–25.7)
CALCIUM SERPL-MCNC: 7.2 MG/DL (ref 8.7–10.5)
CHLORIDE SERPL-SCNC: 111 MMOL/L (ref 98–107)
CO2 SERPL-SCNC: 23 MMOL/L (ref 23–31)
CREAT SERPL-MCNC: 0.71 MG/DL (ref 0.73–1.18)
CREAT/UREA NIT SERPL: 19
ERYTHROCYTE [DISTWIDTH] IN BLOOD BY AUTOMATED COUNT: 13.9 % (ref 11.5–17)
GLUCOSE SERPL-MCNC: 147 MG/DL (ref 82–115)
HCT VFR BLD AUTO: 38.5 % (ref 42–52)
HGB BLD-MCNC: 12.2 GM/DL (ref 14–18)
IMM GRANULOCYTES # BLD AUTO: 0.42 % (ref 0–0.02)
IMM GRANULOCYTES NFR BLD AUTO: 1.9 % (ref 0–0.43)
LACTATE SERPL-SCNC: 1.8 MMOL/L (ref 0.5–2.2)
LYMPHOCYTES # BLD AUTO: 0.7 X10(3)/MCL (ref 0.6–4.6)
LYMPHOCYTES NFR BLD AUTO: 3 %
MCH RBC QN AUTO: 32.2 PG (ref 27–31)
MCHC RBC AUTO-ENTMCNC: 31.7 GM/DL (ref 33–36)
MCV RBC AUTO: 101.6 FL (ref 80–94)
MONOCYTES # BLD AUTO: 0.4 X10(3)/MCL (ref 0.1–1.3)
MONOCYTES NFR BLD AUTO: 2 %
NEUTROPHILS # BLD AUTO: 20.4 X10(3)/MCL (ref 1.4–7.9)
NEUTROPHILS NFR BLD AUTO: 93 %
PLATELET # BLD AUTO: 136 X10(3)/MCL (ref 130–400)
PMV BLD AUTO: 9.5 FL (ref 9.4–12.4)
POTASSIUM SERPL-SCNC: 4.4 MMOL/L (ref 3.5–5.1)
RBC # BLD AUTO: 3.79 X10(6)/MCL (ref 4.7–6.1)
SODIUM SERPL-SCNC: 141 MMOL/L (ref 136–145)
WBC # SPEC AUTO: 21.9 X10(3)/MCL (ref 4.5–11.5)

## 2021-12-07 LAB
ABS NEUT (OLG): 17.94 X10(3)/MCL (ref 2.1–9.2)
BASOPHILS # BLD AUTO: 0 X10(3)/MCL (ref 0–0.2)
BASOPHILS NFR BLD AUTO: 0 %
BNP BLD-MCNC: 100 PG/ML
BUN SERPL-MCNC: 12.1 MG/DL (ref 8.4–25.7)
CALCIUM SERPL-MCNC: 7.8 MG/DL (ref 8.7–10.5)
CHLORIDE SERPL-SCNC: 107 MMOL/L (ref 98–107)
CO2 SERPL-SCNC: 29 MMOL/L (ref 23–31)
CREAT SERPL-MCNC: 0.61 MG/DL (ref 0.73–1.18)
CREAT/UREA NIT SERPL: 20
EOSINOPHIL # BLD AUTO: 0 X10(3)/MCL (ref 0–0.9)
ERYTHROCYTE [DISTWIDTH] IN BLOOD BY AUTOMATED COUNT: 14.4 % (ref 11.5–17)
GLUCOSE SERPL-MCNC: 80 MG/DL (ref 82–115)
HCT VFR BLD AUTO: 35.9 % (ref 42–52)
HGB BLD-MCNC: 11.5 GM/DL (ref 14–18)
IMM GRANULOCYTES # BLD AUTO: 0.3 % (ref 0–0.02)
IMM GRANULOCYTES NFR BLD AUTO: 1.5 % (ref 0–0.43)
LYMPHOCYTES # BLD AUTO: 1.2 X10(3)/MCL (ref 0.6–4.6)
LYMPHOCYTES NFR BLD AUTO: 6 %
MCH RBC QN AUTO: 32.3 PG (ref 27–31)
MCHC RBC AUTO-ENTMCNC: 32 GM/DL (ref 33–36)
MCV RBC AUTO: 100.8 FL (ref 80–94)
MONOCYTES # BLD AUTO: 0.8 X10(3)/MCL (ref 0.1–1.3)
MONOCYTES NFR BLD AUTO: 4 %
NEUTROPHILS # BLD AUTO: 17.94 X10(3)/MCL (ref 1.4–7.9)
NEUTROPHILS NFR BLD AUTO: 89 %
PLATELET # BLD AUTO: 143 X10(3)/MCL (ref 130–400)
PMV BLD AUTO: 10.3 FL (ref 9.4–12.4)
POTASSIUM SERPL-SCNC: 3.7 MMOL/L (ref 3.5–5.1)
RBC # BLD AUTO: 3.56 X10(6)/MCL (ref 4.7–6.1)
SODIUM SERPL-SCNC: 140 MMOL/L (ref 136–145)
WBC # SPEC AUTO: 20.2 X10(3)/MCL (ref 4.5–11.5)

## 2021-12-09 LAB — EST CREAT CLEARANCE SER (OHS): 156.67 ML/MIN

## 2022-03-08 ENCOUNTER — HISTORICAL (OUTPATIENT)
Dept: LAB | Facility: HOSPITAL | Age: 77
End: 2022-03-08

## 2022-03-08 LAB
ABS NEUT (OLG): 5.46 (ref 2.1–9.2)
ALBUMIN SERPL-MCNC: 3.7 G/DL (ref 3.4–4.8)
ALP SERPL-CCNC: 61 U/L (ref 40–150)
ALT SERPL-CCNC: 16 U/L (ref 0–55)
AST SERPL-CCNC: 18 U/L (ref 5–34)
BASOPHILS # BLD AUTO: 0 10*3/UL (ref 0–0.2)
BASOPHILS NFR BLD AUTO: 1 %
BILIRUB SERPL-MCNC: 1.7 MG/DL
BILIRUBIN DIRECT+TOT PNL SERPL-MCNC: 0.5 (ref 0–0.5)
BILIRUBIN DIRECT+TOT PNL SERPL-MCNC: 1.2 (ref 0–0.8)
BUN SERPL-MCNC: 11.7 MG/DL (ref 8.4–25.7)
CALCIUM SERPL-MCNC: 9.1 MG/DL (ref 8.7–10.5)
CHLORIDE SERPL-SCNC: 105 MMOL/L (ref 98–107)
CHOLEST SERPL-MCNC: 106 MG/DL
CHOLEST/HDLC SERPL: 4 {RATIO} (ref 0–5)
CO2 SERPL-SCNC: 27 MMOL/L (ref 23–31)
CREAT SERPL-MCNC: 0.69 MG/DL (ref 0.73–1.18)
CREAT/UREA NIT SERPL: 17
EOSINOPHIL # BLD AUTO: 0.2 10*3/UL (ref 0–0.9)
EOSINOPHIL NFR BLD AUTO: 3 %
ERYTHROCYTE [DISTWIDTH] IN BLOOD BY AUTOMATED COUNT: 14.1 % (ref 11.5–17)
GLUCOSE SERPL-MCNC: 130 MG/DL (ref 82–115)
HCT VFR BLD AUTO: 48.3 % (ref 42–52)
HDLC SERPL-MCNC: 27 MG/DL (ref 35–60)
HEMOLYSIS INTERF INDEX SERPL-ACNC: 29
HEMOLYSIS INTERF INDEX SERPL-ACNC: 29
HGB BLD-MCNC: 15.4 G/DL (ref 14–18)
ICTERIC INTERF INDEX SERPL-ACNC: 1
ICTERIC INTERF INDEX SERPL-ACNC: 1
IMM GRANULOCYTES # BLD AUTO: 0.01 10*3/UL (ref 0–0.02)
IMM GRANULOCYTES NFR BLD AUTO: 0.1 % (ref 0–0.43)
LDLC SERPL CALC-MCNC: 37 MG/DL (ref 50–140)
LIPEMIC INTERF INDEX SERPL-ACNC: 10
LIPEMIC INTERF INDEX SERPL-ACNC: 10
LYMPHOCYTES # BLD AUTO: 1.6 10*3/UL (ref 0.6–4.6)
LYMPHOCYTES NFR BLD AUTO: 20 %
MANUAL DIFF? (OHS): NO
MCH RBC QN AUTO: 31.7 PG (ref 27–31)
MCHC RBC AUTO-ENTMCNC: 31.9 G/DL (ref 33–36)
MCV RBC AUTO: 99.4 FL (ref 80–94)
MONOCYTES # BLD AUTO: 0.7 10*3/UL (ref 0.1–1.3)
MONOCYTES NFR BLD AUTO: 8 %
NEUTROPHILS # BLD AUTO: 5.46 10*3/UL (ref 1.4–7.9)
NEUTROPHILS NFR BLD AUTO: 68 %
PLATELET # BLD AUTO: 159 10*3/UL (ref 130–400)
PMV BLD AUTO: 9.5 FL (ref 9.4–12.4)
POTASSIUM SERPL-SCNC: 4.3 MMOL/L (ref 3.5–5.1)
PROT SERPL-MCNC: 7.3 G/DL (ref 5.8–7.6)
PSA SERPL-MCNC: 0.55 NG/ML
RBC # BLD AUTO: 4.86 10*6/UL (ref 4.7–6.1)
SODIUM SERPL-SCNC: 140 MMOL/L (ref 136–145)
TRIGL SERPL-MCNC: 208 MG/DL (ref 34–140)
TSH SERPL-ACNC: 4.98 M[IU]/L (ref 0.35–4.94)
VLDLC SERPL CALC-MCNC: 42 MG/DL
WBC # SPEC AUTO: 8.1 10*3/UL (ref 4.5–11.5)

## 2022-03-09 LAB
EST. AVERAGE GLUCOSE BLD GHB EST-MCNC: 108.3 MG/DL
HBA1C MFR BLD: 5.4 %

## 2022-03-21 ENCOUNTER — HISTORICAL (OUTPATIENT)
Dept: RADIOLOGY | Facility: HOSPITAL | Age: 77
End: 2022-03-21

## 2022-04-10 ENCOUNTER — HISTORICAL (OUTPATIENT)
Dept: ADMINISTRATIVE | Facility: HOSPITAL | Age: 77
End: 2022-04-10
Payer: MEDICARE

## 2022-04-27 VITALS
SYSTOLIC BLOOD PRESSURE: 134 MMHG | WEIGHT: 205 LBS | OXYGEN SATURATION: 96 % | HEIGHT: 69 IN | DIASTOLIC BLOOD PRESSURE: 68 MMHG | BODY MASS INDEX: 30.36 KG/M2

## 2022-04-29 NOTE — H&P
Patient:   Babak Scales             MRN: [id number]            FIN: [id number]               Age:   76 years     Sex:  Male     :  1945   Associated Diagnoses:   None   Author:   Jayy Greco MD      Basic Information     76-year-old male presented with a few days of increasing shortness of breath.  In the ER he was found to have a white count of 14,000 and a left lower lobe pneumonic infiltrate on chest x-ray.  His blood pressure was 93/60 but his lactic acid level was normal and he is on Entresto/Lasix both of which are being held and blood pressure improved with IV fluid.  He is admitted for antibiotics and clinical monitoring.      Chief Complaint   2021 12:09 CST      congestion x2 weeks, fever and SOB starting today.        Review of Systems   Positive for cough, shortness of breath, dyspnea on exertion, generalized malaise.  10 systems reviewed and negative except as noted.      Health Status   Allergies:    Allergic Reactions (Selected)  Low  Aspirin- No reactions were documented.,    Allergies (1) Active Reaction  aspirin None Documented     Current medications:  (Selected)   Inpatient Medications  Ordered  Ambien 5 mg oral tablet: 5 mg, form: Tab, Oral, At Bedtime PRN for insomnia, first dose 21 18:00:00 CST, May repeat 30 minutes later once per evening if initial 5mg dose ineffective  Bactrim DS Tab.  800 mg - 160 m tab(s), form: Tab, Oral, Once, first dose 20 18:54:00 CST, stop date 20 18:54:00 CST, STAT  Breo Ellipta 100 mcg-25 mcg/inh inhalation powder: 1 inh, form: Powder, INH, Daily, first dose 21 9:00:00 CST  Dulcolax Laxative 5 mg ORAL enteric coated tablet: 10 mg, form: Tab-EC, Oral, Daily PRN for constipation, first dose 21 18:00:00 CST  DuoNeb 0.5 mg-2.5 mg/3 mL inhalation solution: 3 mL, form: Soln-Inh, NEB, q4hr PRN for shortness of breath or wheezing, first dose 21 21:38:00 CST  Flagyl 500 mg oral tablet: 500 mg, form: Tab, Oral,  Once, first dose 02/07/20 18:52:00 CST, stop date 02/07/20 18:52:00 CST, STAT  Flomax 0.4 mg oral capsule: 0.4 mg, form: Cap, Oral, Daily, first dose 12/06/21 9:00:00 CST  Levaquin 500 mg/100 mL intravenous solution: 750 mg, form: Infusion CAP, IV Piggyback, q24hr, Infuse over: 1.5 hr, first dose 12/05/21 22:52:00 CST  NS (0.9% Sodium Chloride) Infusion 1,000 mL: 1,000 mL, 1,000 mL, IV, 150 mL/hr, start date 12/06/21 9:08:00 CST, 2.1, m2  NS (0.9% Sodium Chloride) Infusion 1,000 mL: 1,000 mL, 1,000 mL, IV, 75 mL/hr, start date 12/06/21 9:10:00 CST, 2.1, m2  Nitrostat 0.4 mg sublingual tab: 0.4 mg, form: Tab-SL, SL, q5min PRN for chest pain, first dose 12/05/21 17:59:00 CST  Norco  10 mg-325 mg oral tablet: 1 tab(s), form: Tab, Oral, q4hr PRN for pain, first dose 12/05/21 18:00:00 CST  Pantoprazole 40 mg ORAL EC-Tablet: 40 mg, form: Tab-EC, Oral, At Bedtime, first dose 12/05/21 21:00:00 CST  Sodium Chloride 0.9% 1000mL 500 mL: 500 mL, 500 mL, IV, q1hr, 999 mL/hr, hypotension, start date 12/05/21 21:00:00 CST, maintain SBP > 90, 2.1, m2  Sodium Chloride 0.9% 500ml: 500 mL, 500 mL, IV, q1hr, 1000 mL/hr, PRN hypotension, start date 12/05/21 20:21:00 CST, For SBP <90  Tessalon Perles: 200 mg, form: Cap, Oral, q8hr PRN for cough, first dose 12/05/21 18:00:00 CST  Tylenol 325 mg oral tablet: 650 mg, form: Tab, Oral, q4hr PRN for fever, first dose 12/05/21 18:00:00 CST, > 38°C (100.4°F)  Tylenol 325 mg oral tablet: 650 mg, form: Tab, Oral, q4hr PRN for pain, mild, first dose 12/05/21 18:00:00 CST  Xarelto 20mg Tablet: 20 mg, form: Tab, Oral, Daily, first dose 12/06/21 9:00:00 CST  Zithromax 500 mg/D5W 250 mL IVPB: 500 mg, form: Injection CAP, IV Piggyback, q24hr, Infuse over: 1 hr, first dose 12/05/21 21:00:00 CST  Zofran 2 mg/mL injectable solution: 4 mg, form: Injection, IV Push, q4hr PRN for nausea/vomiting, first dose 12/05/21 18:00:00 CST  Zofran ORAL tab: 8 mg, form: Tab-Dis, Oral, QID PRN for nausea/vomiting, first  dose 12/05/21 18:00:00 CST, If no IV access  atorvastatin 40 mg oral tablet: 40 mg, form: Tab, Oral, Once a day (at bedtime), first dose 12/05/21 21:00:00 CST  cloniDINE 0.1 mg oral tablet: 0.1 mg, form: Tab, Oral, QID PRN for hypertension, first dose 12/05/21 18:00:00 CST, NOW, PRN SBP > 160  digoxin 125 mcg (0.125 mg) oral tablet: 125 mcg, form: Tab, Oral, Daily, first dose 12/06/21 9:00:00 CST  diphenhydrAMINE  IV Push: 25 mg, form: Injection, IV Push, q4hr PRN for itching, first dose 12/05/21 18:00:00 CST, Use if itching and Pt is NPO  diphenhydrAMINE  ORAL: 25 mg, form: Cap, Oral, q4hr PRN for itching, first dose 12/05/21 18:00:00 CST  hydrALAZINE 20 mg/mL injectable solution: 10 mg, form: Injection, IV Push, q3hr PRN for hypertension, first dose 12/05/21 18:00:00 CST, SBP>160, use if NPO or if PRN Clonidine does npot reduce SBP to <160  loratadine 10 mg oral capsule: 10 mg, form: Tab, Oral, qPM, first dose 12/05/21 21:00:00 CST  ondansetron: 4 mg, form: Injection, IV Push, q6hr PRN for nausea/vomiting, first dose 12/05/21 16:42:00 CST  umeclidinium: 1 inh, 62.5 mcg =, form: Powder, INH, Daily, first dose 12/06/21 9:00:00 CST  Prescriptions  Prescribed  levalbuterol 45 mcg/inh inhalation aerosol: See Instructions, INHALE 2 PUFFS BY MOUTH EVERY FOUR (4) HOURS AS NEEDED FOR WHEEZING, # 15 cap(s), 5 Refill(s), Pharmacy: [facility], 167, cm, Height/Length Dosing, 02/14/21 14:28:00 CST, 74, kg, Weight Dosing, 02/14/21 14:28:00 CST  Documented Medications  Documented  Entresto 24 mg-26 mg oral tablet: 1 tab(s), Oral, BID, # 60 tab(s), 0 Refill(s)  Nitrostat 0.4 mg sublingual tab: 0.4 mg = 1 tab(s), SL, q5min, PRN PRN for chest pain, # 100 tab(s), 0 Refill(s)  Prilosec OTC 20 mg oral EC tablet: 20 mg = 1 tab(s), Oral, At Bedtime, 0 Refill(s)  Trelegy Ellipta 100 mcg-62.5 mcg-25 mcg/inh inhalation powder: = 1 puff(s), INH, Daily, at the same time every day, # 60 EA, 0 Refill(s)  Xarelto 20mg Tablet: 20 mg, Oral,  Daily, tab(s), 0 Refill(s)  alfuzosin 10 mg oral tablet, extended release: 10 mg = 1 tab(s), Oral, Daily, # 30 tab(s), 0 Refill(s)  atorvastatin 40 mg oral tablet: 1 tab), Oral, Once a day (at bedtime), # 30 tab(s), 0 Refill(s)  digoxin 125 mcg (0.125 mg) oral tablet: 125 mcg = 1 tab(s), Oral, Daily  furosemide 20 mg oral tablet: 20 mg = 1 tab(s), Oral, Daily, # 30 tab(s), 0 Refill(s)  loratadine 10 mg oral tablet: 10 mg = 1 tab(s), Oral, qPM, # 30 tab(s), 0 Refill(s)  sotalol 120 mg oral tablet: 180 mg = 1.5 tab(s), Oral, Daily, # 60 tab(s), 0 Refill(s)  sotalol 80 mg oral tablet: 180 mg = 2.25 tab(s), Oral, Daily, 0 Refill(s)   Problem list:    All Problems  Skin cancer / SNOMED CT 9834752674 / Confirmed  Bladder cancer / SNOMED CT 5139457784 / Confirmed  SOB (shortness of breath) / SNOMED CT [id number] / Confirmed  COPD - Chronic obstructive pulmonary disease / SNOMED CT 807053153 / Confirmed  Obesity / SNOMED CT 0266939845 / Probable  Atrial fibrillation / SNOMED CT 94033258 / Confirmed  Insomnia / SNOMED CT 633947238 / Confirmed  GERD - Gastro-esophageal reflux disease / SNOMED CT 4387002282 / Confirmed  Diverticulitis / SNOMED CT 131724030 / Confirmed  Irregular heart beat / SNOMED CT 894181874 / Confirmed  Asthma / SNOMED CT 732014788 / Confirmed,    Active Problems (11)  Asthma   Atrial fibrillation   Bladder cancer   COPD - Chronic obstructive pulmonary disease   Diverticulitis   GERD - Gastro-esophageal reflux disease   Insomnia   Irregular heart beat   Obesity   Skin cancer   SOB (shortness of breath)         Histories   Past Medical History:    Active  SOB (shortness of breath) ([id number])  Irregular heart beat (687726340)  Asthma (835539881)  Bladder cancer (6076482660)  Resolved  Chest pain ([id number]):  Resolved.  Back pain (0087160857):  Resolved.  Skin cancer (6563738991):  Resolved.  Gastric ulcer  ([id number]):  Resolved.  Cardiomyopathy (076500183):  Resolved.  CAD - Coronary artery disease (0136780730):  Resolved.  CHF - Congestive heart failure (710990263):  Resolved.  HLD - Hyperlipidemia (599078235):  Resolved.  Bladder mass (4436244197):  Resolved.  Polyp of colon (904969794):  Resolved.  Kidney stone (403590612):  Resolved.   Family History:    Primary malignant neoplasm of prostate  Father  Alzheimer's disease  Father  Acute myocardial infarction.  Mother     Procedure history:    Cardioversion (., None) on 4/30/2020 at 74 Years.  Comments:  4/30/2020 8:20 Mesha Garcia RN  auto-populated from documented surgical case  Colonoscopy on 9/12/2016 at 71 Years.  Comments:  9/12/2016 14:08 Ramakrishna Sloan RN  auto-populated from documented surgical case  Insertion Single/Dual Tashi Pulse Generator  Leads on 11/19/2015 at 70 Years.  Comments:  11/19/2015 11:52 Catherine Omer RN  auto-populated from documented surgical case  Insert Bivent Lead New System on 11/19/2015 at 70 Years.  Comments:  11/19/2015 11:52 Catherine Omer RN  auto-populated from documented surgical case  cervical fusion.  Cholecystectomy (92793663).  Hemorrhoidectomy (84690541).  skin CA removed.  Comments:  8/3/2012 5:27 Racheal Shelley RN  nose  SI fusion.  Comments:  4/19/2016 8:36 Sophia [address]ina Dr in [place] 1996  Bladder Cancer surgery.   Social History        Social & Psychosocial Habits    Alcohol  10/21/2012 Risk Assessment: Low Risk    04/19/2016  Use: Current    Type: Beer    Frequency: 1-2 times per week    Average drinks per episode in last year: 2    Has alcohol use interfered with work or home life? No    Do you ever drink more than intended? No    Has anyone been hurt or at risk by your drinking? No    Concerns about alcohol use in household: No    02/01/2021  Use: Current    Type: Beer    Frequency: 1-2 times per  month    Home/Environment  10/23/2019  Lives with: Spouse    Living situation: Home/Independent    Substance Use  04/19/2016  Use: Never    Tobacco  08/03/2012 Risk Assessment: Denies Tobacco Use    01/25/2021  Use: Former smoker, quit more    Patient Wants Consult For Cessation Counseling N/A    Stopped at age: 47 Years    02/01/2021  Use: Former smoker, quit more    Type: Cigarettes    Patient Wants Consult For Cessation Counseling N/A    Stopped at age: 47 Years    02/14/2021  Use: Former smoker, quit more    Patient Wants Consult For Cessation Counseling No    02/15/2021  Use: Former smoker, quit more    Patient Wants Consult For Cessation Counseling N/A    12/05/2021  Use: Former smoker, quit more    Patient Wants Consult For Cessation Counseling No    Abuse/Neglect  01/25/2021  SHX Any signs of abuse or neglect No    Feels unsafe at home: No    02/01/2021  SHX Any signs of abuse or neglect No    02/14/2021  SHX Any signs of abuse or neglect No    02/15/2021  SHX Any signs of abuse or neglect No    12/05/2021  SHX Any signs of abuse or neglect No    Feels unsafe at home: No    Safe place to go: Yes    Spiritual/Cultural  04/29/2020  Restorationism Preference Islam  .        Physical Examination   Vital Signs   12/6/2021 11:07 CST      Temperature Oral          36.9 DegC                             Temperature Oral (calculated)             98.42 DegF                             Peripheral Pulse Rate     68 bpm                             SpO2                      94 %                             Systolic Blood Pressure   108 mmHg                             Diastolic Blood Pressure  58 mmHg  LOW                             Mean Arterial Pressure, Cuff              75 mmHg    12/6/2021 10:00 CST      Oxygen Therapy            Humidification, Nasal cannula                             Oxygen Flow Rate          2 L/min    12/6/2021 8:59 CST       Apical Heart Rate         86 bpm    12/6/2021 8:00 CST       Oxygen  Therapy            Humidification, Nasal cannula                             Oxygen Flow Rate          2 L/min    12/6/2021 7:48 CST       Temperature Oral          36.5 DegC                             Temperature Oral (calculated)             97.70 DegF                             Peripheral Pulse Rate     83 bpm                             SpO2                      94 %                             Systolic Blood Pressure   118 mmHg                             Diastolic Blood Pressure  73 mmHg                             Mean Arterial Pressure, Cuff              88 mmHg    12/6/2021 7:35 CST       Peripheral Pulse Rate     65 bpm                             Respiratory Rate          16 br/min                             SpO2                      100 %                             Oxygen Therapy            Humidification, Nasal cannula                             Oxygen Flow Rate          2 L/min    12/6/2021 6:00 CST       Peripheral Pulse Rate     88 bpm                             Respiratory Rate          18 br/min                             SpO2                      94 %                             Oxygen Therapy            Nasal cannula                             Oxygen Flow Rate          1.5 L/min                             Systolic Blood Pressure   93 mmHg                             Diastolic Blood Pressure  57 mmHg  LOW    12/6/2021 5:10 CST       Peripheral Pulse Rate     81 bpm                             SpO2                      92 %  LOW                             Systolic Blood Pressure   97 mmHg                             Diastolic Blood Pressure  60 mmHg                             Mean Arterial Pressure, Cuff              72 mmHg    12/6/2021 5:00 CST       Oxygen Therapy            Nasal cannula                             Oxygen Flow Rate          1.5 L/min    12/6/2021 3:00 CST       Peripheral Pulse Rate     89 bpm                             SpO2                      96 %                              Systolic Blood Pressure   89 mmHg  LOW                             Diastolic Blood Pressure  56 mmHg  LOW    12/6/2021 2:25 CST       Peripheral Pulse Rate     84 bpm                             SpO2                      94 %                             Systolic Blood Pressure   93 mmHg                             Diastolic Blood Pressure  57 mmHg  LOW    12/6/2021 2:00 CST       Peripheral Pulse Rate     80 bpm                             Respiratory Rate          20 br/min                             SpO2                      96 %                             SpO2                      94 %                             Systolic Blood Pressure   83 mmHg  LOW                             Diastolic Blood Pressure  52 mmHg  LOW    12/6/2021 1:14 CST       Peripheral Pulse Rate     71 bpm                             SpO2                      93 %  LOW                             Systolic Blood Pressure   86 mmHg  LOW                             Diastolic Blood Pressure  55 mmHg  LOW                             Mean Arterial Pressure, Cuff              65 mmHg    12/6/2021 0:12 CST       Peripheral Pulse Rate     83 bpm                             Respiratory Rate          16 br/min                             SpO2                      97 %                             Systolic Blood Pressure   79 mmHg  LOW                             Diastolic Blood Pressure  46 mmHg  LOW                             Mean Arterial Pressure, Cuff              57 mmHg    12/5/2021 23:30 CST      Temperature Oral          36.4 DegC                             Temperature Oral (calculated)             97.52 DegF                             Peripheral Pulse Rate     88 bpm                             SpO2                      94 %                             Systolic Blood Pressure   86 mmHg  LOW                             Diastolic Blood Pressure  47 mmHg  LOW                             Mean Arterial Pressure, Cuff              60  mmHg    12/5/2021 23:05 CST      SpO2                      96 %                             Oxygen Therapy            Nasal cannula                             Oxygen Flow Rate          3 L/min    12/5/2021 23:00 CST      Peripheral Pulse Rate     85 bpm                             SpO2                      96 %    12/5/2021 22:23 CST      Peripheral Pulse Rate     76 bpm                             SpO2                      92 %  LOW                             Systolic Blood Pressure   92 mmHg                             Diastolic Blood Pressure  54 mmHg  LOW                             Mean Arterial Pressure, Cuff              67 mmHg    12/5/2021 22:20 CST      Peripheral Pulse Rate     78 bpm                             Respiratory Rate          18 br/min                             SpO2                      92 %  LOW                             Systolic Blood Pressure   87 mmHg  LOW                             Diastolic Blood Pressure  44 mmHg  LOW                             Mean Arterial Pressure, Cuff              58 mmHg    12/5/2021 22:19 CST      Peripheral Pulse Rate     71 bpm                             SpO2                      92 %  LOW                             Systolic Blood Pressure   69 mmHg  LOW                             Diastolic Blood Pressure  47 mmHg  LOW                             Mean Arterial Pressure, Cuff              54 mmHg    12/5/2021 22:00 CST      Oxygen Therapy            Humidification, Nasal cannula                             Oxygen Flow Rate          3 L/min    12/5/2021 21:18 CST      Peripheral Pulse Rate     81 bpm                             SpO2                      93 %  LOW                             Systolic Blood Pressure   93 mmHg                             Diastolic Blood Pressure  56 mmHg  LOW                             Mean Arterial Pressure, Cuff              68 mmHg    12/5/2021 21:00 CST      Oxygen Therapy            Nasal cannula                              Oxygen Flow Rate          3 L/min    12/5/2021 20:39 CST      Peripheral Pulse Rate     75 bpm                             Respiratory Rate          16 br/min                             SpO2                      92 %  LOW                             Systolic Blood Pressure   94 mmHg                             Diastolic Blood Pressure  55 mmHg  LOW                             Mean Arterial Pressure, Cuff              68 mmHg    12/5/2021 20:36 CST      Peripheral Pulse Rate     85 bpm                             Heart Rate Monitored      80 bpm                             Respiratory Rate          18 br/min                             SpO2                      92 %  LOW                             Oxygen Therapy            Nasal cannula                             Oxygen Flow Rate          2 L/min                             Systolic Blood Pressure   182 mmHg  HI                             Diastolic Blood Pressure  109 mmHg  HI    12/5/2021 20:15 CST      Oxygen Therapy            Nasal cannula                             Oxygen Flow Rate          3 L/min    12/5/2021 20:05 CST      Peripheral Pulse Rate     76 bpm                             SpO2                      90 %  LOW                             Systolic Blood Pressure   80 mmHg  LOW                             Diastolic Blood Pressure  34 mmHg  LOW                             Mean Arterial Pressure, Cuff              49 mmHg    12/5/2021 19:48 CST      Temperature Oral          36.5 DegC                             Temperature Oral (calculated)             97.70 DegF                             Peripheral Pulse Rate     73 bpm                             SpO2                      90 %  LOW                             Systolic Blood Pressure   77 mmHg  LOW                             Diastolic Blood Pressure  49 mmHg  LOW                             Mean Arterial Pressure, Cuff              58 mmHg    12/5/2021 19:00 CST      Systolic Blood  Pressure   88 mmHg  LOW                             Diastolic Blood Pressure  54 mmHg  LOW    12/5/2021 18:00 CST      Temperature Oral          36.7 DegC                             Temperature Oral (calculated)             98.06 DegF                             Peripheral Pulse Rate     74 bpm                             SpO2                      94 %                             Systolic Blood Pressure   74 mmHg  LOW                             Diastolic Blood Pressure  46 mmHg  LOW    12/5/2021 16:59 CST      24 HR Intake Totals       137.33 mL                             24 HR Output Totals       0 mL                             24 HR I&O Balance         137.33 mL    12/5/2021 16:00 CST      Peripheral Pulse Rate     85 bpm                             Heart Rate Monitored      81 bpm                             Respiratory Rate          25 br/min  HI                             SpO2                      92 %  LOW                             Oxygen Therapy            Nasal cannula                             Oxygen Flow Rate          2 L/min    12/5/2021 15:45 CST      Temperature Oral          36.5 DegC                             Temperature Oral (calculated)             97.70 DegF                             Peripheral Pulse Rate     88 bpm                             Respiratory Rate          18 br/min                             SpO2                      94 %                             Oxygen Therapy            Nasal cannula    12/5/2021 14:58 CST      Temperature Oral          39 DegC  HI                             Temperature Oral (calculated)             102.20 DegF                             Peripheral Pulse Rate     81 bpm                             Respiratory Rate          17 br/min                             SpO2                      92 %  LOW                             Oxygen Therapy            Nasal cannula    12/5/2021 12:09 CST      Temperature Oral          37.9 DegC                              Temperature Oral (calculated)             100.22 DegF                             Peripheral Pulse Rate     88 bpm                             Respiratory Rate          17 br/min                             SpO2                      91 %  LOW                             Oxygen Therapy            Room air                             Systolic Blood Pressure   171 mmHg  HI                             Diastolic Blood Pressure  81 mmHg        Vital Signs (last 24 hrs)_____  Last Charted___________  Temp Oral     36.9 DegC  (DEC 06 11:07)  Heart Rate Peripheral   68 bpm  (DEC 06 11:07)  Resp Rate         16 br/min  (DEC 06 07:35)  SBP      108 mmHg  (DEC 06 11:07)  DBP      L 58mmHg  (DEC 06 11:07)  SpO2      94 %  (DEC 06 11:07)  Weight      93.5 kg  (DEC 06 06:00)  Height      176 cm  (DEC 05 16:03)  BMI      30.35  (DEC 05 16:03)     General: well-developed well-nourished in no acute distress  Eye: PERRLA, EOMI, clear conjunctiva, eyelids normal  HENT: Oropharynx without erythema/exudate, oropharynx and nasal mucosal surfaces moist  Neck: No thyromegaly or lymphadenopathy  Respiratory: clear to auscultation bilaterally  Cardiovascular: regular rate and rhythm without murmurs, gallops or rubs  Gastrointestinal: soft, non-tender, non-distended with normal bowel sounds, without masses to palpation  Integumentary: no rashes or skin lesions present  Neurologic: cranial nerves grossly intact, no signs of peripheral neurological deficit  Psych: Appropriate affect, not anxious or depressed      Impression and Plan     Left lower lobe community-acquired pneumonia  -Levaquin 750 mg daily  -Follow-up cultures    Chronic CHF with 40 to 45% EF  COPD  Hypertension history  PVD  Hypothyroidism    CODE STATUS is full code  VTE prophylaxis with Xarelto    As clarification on 12/5/2021 patient should be admitted for hospital observation services under my care

## 2022-04-29 NOTE — OP NOTE
Patient:   Babak Scales            MRN: [id number]            FIN: [id number]               Age:   74 years     Sex:  Male     :  1945   Associated Diagnoses:   None   Author:   Jono Rutherford MD      Cardiologist: Dr. Jono Rutherford    Assistant: _    Preoperative Diagnosis(es):  [ X ] Atrial fibrillation  [ _ ] Artial flutter  [ _ ] Artial tachycardia    Postoperative Diagnosis(es):  [ _ ] Normal Sinus rhythm  [ _ ] Sinus bradycardia  [ _ ] Artial fibrillation  [ _ ] Sinus with third-degree atrioventricular block  [ X ] A-V sequential pacing/capture    Procedure(s):  Electrical cardioversion    Complications:  None    Description of Procedure:  After informed consent was obtained and permit signed, the patient was transported to the Cardiac Electrophysiology Laboratory. The EKG electrodes and [ _ ]cardioverter / [ _ ]defibrillator pads were applied to the patient. Pulse oximetry and ventilation were monitored, with oxygen and ventilation assistance given as needed. Adequate sedation for the procedure was accomplished by [X]the Anesthesia service/[_] IV Versend and Fentanyl. Synchronized direct-current energy was delivered at a level of 200 joules biphasic. Additional energy was [X] not indicated/[_] indicated at _ joules biphasic. Sinus rhythm[X]was/[_] was not restored. Upon awakening, the patient was transferred in a stable condition to a monitored bed for recovery.

## 2022-05-02 ENCOUNTER — OFFICE VISIT (OUTPATIENT)
Dept: ORTHOPEDICS | Facility: CLINIC | Age: 77
End: 2022-05-02
Payer: MEDICARE

## 2022-05-02 VITALS — WEIGHT: 207 LBS | HEIGHT: 70 IN | BODY MASS INDEX: 29.63 KG/M2

## 2022-05-02 DIAGNOSIS — M84.362A STRESS FRACTURE OF LEFT TIBIA, INITIAL ENCOUNTER: ICD-10-CM

## 2022-05-02 DIAGNOSIS — M25.562 ACUTE PAIN OF LEFT KNEE: ICD-10-CM

## 2022-05-02 DIAGNOSIS — S83.242S ACUTE MEDIAL MENISCUS TEAR OF LEFT KNEE, SEQUELA: Primary | ICD-10-CM

## 2022-05-02 PROCEDURE — 99213 OFFICE O/P EST LOW 20 MIN: CPT | Mod: ,,, | Performed by: ORTHOPAEDIC SURGERY

## 2022-05-02 PROCEDURE — 99213 PR OFFICE/OUTPT VISIT, EST, LEVL III, 20-29 MIN: ICD-10-PCS | Mod: ,,, | Performed by: ORTHOPAEDIC SURGERY

## 2022-05-02 RX ORDER — DIGOXIN 125 MCG
125 TABLET ORAL DAILY
COMMUNITY
Start: 2021-12-05

## 2022-05-02 RX ORDER — DOCUSATE SODIUM 100 MG/1
CAPSULE, LIQUID FILLED ORAL
Status: ON HOLD | COMMUNITY
Start: 2022-03-29 | End: 2022-08-15

## 2022-05-02 RX ORDER — SOTALOL HYDROCHLORIDE 120 MG/1
TABLET ORAL
Status: ON HOLD | COMMUNITY
Start: 2022-04-27 | End: 2022-08-15

## 2022-05-02 RX ORDER — LEVOTHYROXINE SODIUM 50 UG/1
50 TABLET ORAL DAILY
COMMUNITY
Start: 2022-04-08

## 2022-05-02 RX ORDER — RIVAROXABAN 20 MG/1
20 TABLET, FILM COATED ORAL DAILY
COMMUNITY
Start: 2022-04-01 | End: 2023-10-19

## 2022-05-02 RX ORDER — LEVALBUTEROL TARTRATE 45 UG/1
AEROSOL, METERED ORAL
Status: ON HOLD | COMMUNITY
Start: 2021-07-08 | End: 2022-08-17 | Stop reason: SDUPTHER

## 2022-05-02 RX ORDER — ATORVASTATIN CALCIUM 40 MG/1
40 TABLET, FILM COATED ORAL NIGHTLY
COMMUNITY
Start: 2022-04-04

## 2022-05-02 RX ORDER — CEFUROXIME AXETIL 250 MG/1
250 TABLET ORAL 2 TIMES DAILY
Status: ON HOLD | COMMUNITY
Start: 2022-04-19 | End: 2022-08-15

## 2022-05-02 RX ORDER — SACUBITRIL AND VALSARTAN 24; 26 MG/1; MG/1
1 TABLET, FILM COATED ORAL 2 TIMES DAILY
COMMUNITY
Start: 2022-04-12

## 2022-05-02 RX ORDER — ALFUZOSIN HYDROCHLORIDE 10 MG/1
10 TABLET, EXTENDED RELEASE ORAL DAILY
COMMUNITY
Start: 2022-04-12

## 2022-05-02 RX ORDER — TRAZODONE HYDROCHLORIDE 100 MG/1
100 TABLET ORAL
Status: ON HOLD | COMMUNITY
Start: 2021-12-07 | End: 2022-08-15

## 2022-05-02 RX ORDER — FLUTICASONE FUROATE, UMECLIDINIUM BROMIDE AND VILANTEROL TRIFENATATE 100; 62.5; 25 UG/1; UG/1; UG/1
POWDER RESPIRATORY (INHALATION)
COMMUNITY
Start: 2022-04-04 | End: 2023-03-08 | Stop reason: SDUPTHER

## 2022-05-02 NOTE — PROGRESS NOTES
Subjective:    CC: Pain of the Left Knee       HPI:  Patient returns today for repeat exam.  Patient continues to have pain and loss of motion of his left knee.  He states his pain is worse at night.  He feels a sharp stabbing pain on the inside part of his knee.  At last visit, 4 weeks ago he received injection, with minimal relief.  This all [place] the dock.    ROS: Refer to HPI for pertinent ROS. All other 12 point systems negative.    Objective:    Physical Exam:  Left lower extremity compartment soft and warm.  Skin is intact.  There is no signs or symptoms of DVT infection.  He may be point tender along the medial joint line positive medial Ciera's.  His motion is 5-115 degrees he is stable to stressing is also very tender along the medial tibial plateau with swelling.  Does walk with a slight hesitant gait, he is neurovascular intact distally.    Images: .. Images Reviewed and discussed with patient.    Assessment:  1. Acute medial meniscus tear of left knee, sequela  .    2. Stress fracture of left tibia, initial encounter  .    3. Acute pain of left knee  .  - CT Knee Without Contrast Left; Future       Plan:  At this time we have discussed his physical exam and previous x-ray findings.  Patient remains to be symptomatic, he has failed multiple conservative treatments.  We are not able to obtain an MRI given his pacemaker, we will proceed with a CT scan for further evaluation of his left knee, suspected meniscal tear versus stress fracture.  I would like to see him back later this week for his results.    d

## 2022-05-06 ENCOUNTER — HOSPITAL ENCOUNTER (OUTPATIENT)
Dept: RADIOLOGY | Facility: HOSPITAL | Age: 77
Discharge: HOME | End: 2022-05-06
Attending: ORTHOPAEDIC SURGERY
Payer: MEDICARE

## 2022-05-06 DIAGNOSIS — M25.562 ACUTE PAIN OF LEFT KNEE: ICD-10-CM

## 2022-05-06 PROCEDURE — 73700 CT LOWER EXTREMITY W/O DYE: CPT | Mod: TC,LT

## 2022-05-11 ENCOUNTER — OFFICE VISIT (OUTPATIENT)
Dept: ORTHOPEDICS | Facility: CLINIC | Age: 77
End: 2022-05-11
Payer: MEDICARE

## 2022-05-11 VITALS — BODY MASS INDEX: 29.49 KG/M2 | WEIGHT: 206 LBS | HEIGHT: 70 IN

## 2022-05-11 DIAGNOSIS — M17.12 PRIMARY OSTEOARTHRITIS OF LEFT KNEE: Primary | ICD-10-CM

## 2022-05-11 DIAGNOSIS — M76.892 LEFT KNEE TENDONITIS: ICD-10-CM

## 2022-05-11 PROCEDURE — 99213 PR OFFICE/OUTPT VISIT, EST, LEVL III, 20-29 MIN: ICD-10-PCS | Mod: ,,, | Performed by: ORTHOPAEDIC SURGERY

## 2022-05-11 PROCEDURE — 99213 OFFICE O/P EST LOW 20 MIN: CPT | Mod: ,,, | Performed by: ORTHOPAEDIC SURGERY

## 2022-05-11 NOTE — PROGRESS NOTES
Subjective:    CC: Pain of the Left Knee and Knee Pain (Here for CT results, states he only has pain when he is in bed)       HPI:  Patient returns today for repeat exam.  Patient continues to have some tenderness along the medial knee, left knee.  We were not able to obtain an MRI, though we got a CT scan.  There is no obvious stress fracture.  It does appear he has some MCL tendinitis, suspected bursitis.  There is no obvious stress fracture obvious meniscal involvement.  He does have some underlying arthritis.    ROS: Refer to HPI for pertinent ROS. All other 12 point systems negative.    Objective:    Physical Exam:  Left lower extremity compartment soft and warm.  Skin is intact.  There is no signs symptoms of DVT infection.  He may begin on medial joint line questionable Ciera's today.  He is tender along the MCL he is stable to stressing at 0 and 30° his motion is 0-110 degrees.  Otherwise he is nontender there is no gross effusion swelling or erythema, he walks with slight hesitant gait he is neurovascular intact distally.    Images: .. Images Reviewed and discussed with patient.    Assessment:  1. Primary osteoarthritis of left knee    2. Left knee tendonitis        Plan:  At this time we discussed his physical exam and CT findings.  He will continue low-impact activities we have discussed a compression wrap versus sleeve.  We have discussed and Tylenol as needed, with appropriate precautions.  I would like see back in 2 months for possible repeat injection at    Follow UP: Follow up in about 8 weeks (around 7/6/2022).

## 2022-07-11 ENCOUNTER — OFFICE VISIT (OUTPATIENT)
Dept: ORTHOPEDICS | Facility: CLINIC | Age: 77
End: 2022-07-11
Payer: MEDICARE

## 2022-07-11 DIAGNOSIS — M17.12 OSTEOARTHRITIS OF LEFT KNEE, UNSPECIFIED OSTEOARTHRITIS TYPE: Primary | ICD-10-CM

## 2022-07-11 DIAGNOSIS — M76.892 LEFT KNEE TENDONITIS: ICD-10-CM

## 2022-07-11 PROCEDURE — 99213 OFFICE O/P EST LOW 20 MIN: CPT | Mod: ,,,

## 2022-07-11 PROCEDURE — 99213 PR OFFICE/OUTPT VISIT, EST, LEVL III, 20-29 MIN: ICD-10-PCS | Mod: ,,,

## 2022-07-11 NOTE — PROGRESS NOTES
Subjective:    CC: Pain of the Left Knee and Follow-up (L knee inj 3/21/22 - pt states that the inj did not help his knee - he states that the pain is at night - td)       HPI:  Patient presents to clinic for repeat evaluation of left knee pain.  Status post injection on 03/21/2022.  Patient also did have a fairly benign CT that showed some MCL denies and suspected bursitis.  He states the steroid injection did not help.  He continues to have pain about the medial aspect of his knee.  He states his pain is worse at night.  Not currently taking any anti-inflammatories.  Patient is on a blood thinner.  He denies any new complaints    ROS: Refer to HPI for pertinent ROS. All other 12 point systems negative.    Objective:    There were no vitals filed for this visit.     Physical Exam:  The patient is well-nourished, well-developed and in no apparent distress, pleasant and cooperative. Examination of the left lower extremity compartments are soft and warm. Skin is intact. There are no signs or symptoms of DVT or infection. There is no joint effusion. There is no erythema. Tender to palpation along the medial anterior joint line , left knee range of motion is 5-115 degrees. The knee is stable to exam with varus and valgus stressing. Negative anterior and posterior drawer. Negative Lachman´s.  Negative Ciera's test. Patella grind is positive, Negative for apprehension. Neurovascularly intact distally.    Images:  Previous Images Reviewed and discussed with patient.  Kg grade 2 of left knee.     Assessment:  1. Osteoarthritis of left knee, unspecified osteoarthritis type    2. Left knee tendonitis       Plan:  Physical exam and previous imaging findings discussed with patient.  We discussed the risks and benefits of a gel injection.  We will proceed with this at the patient's convenience.  In the meantime continue low-impact activities.  I would like to see the patient back in 2 weeks for possible Synvisc  injection.    Follow up: Follow up in about 2 weeks (around 7/25/2022).

## 2022-08-08 ENCOUNTER — OFFICE VISIT (OUTPATIENT)
Dept: ORTHOPEDICS | Facility: CLINIC | Age: 77
End: 2022-08-08
Payer: MEDICARE

## 2022-08-08 VITALS — HEIGHT: 70 IN | BODY MASS INDEX: 29.48 KG/M2 | WEIGHT: 205.94 LBS

## 2022-08-08 DIAGNOSIS — M17.12 PRIMARY OSTEOARTHRITIS OF LEFT KNEE: Primary | ICD-10-CM

## 2022-08-08 PROCEDURE — 99213 OFFICE O/P EST LOW 20 MIN: CPT | Mod: ,,, | Performed by: ORTHOPAEDIC SURGERY

## 2022-08-08 PROCEDURE — 99213 PR OFFICE/OUTPT VISIT, EST, LEVL III, 20-29 MIN: ICD-10-PCS | Mod: ,,, | Performed by: ORTHOPAEDIC SURGERY

## 2022-08-08 NOTE — PROGRESS NOTES
Subjective:    CC: Pain of the Left Knee and Injections (Pt here for L knee synisc one inj, states his knee is 100% better, swelling went down, not wanting injection today)       HPI:  Patient returns today for repeat exam.  Patient states his left knee feels much better.  He states he feels back to normal.  He has been staying active, cutting his grass.  He is presently with family he denies any new complaints.    ROS: Refer to \[facility] [place] [facility]\"" for pertinent ROS. All other 12 point systems negative.    Objective:  He  Physical Exam:  The patient is well-nourished, well-developed and in no apparent distress, pleasant and cooperative. Examination of the left lower extremity compartments are soft and warm. Skin is intact. There are no signs or symptoms of DVT or infection. There is no obvious joint effusion. There is no erythema. Tender to palpation along the anterior aspect , left knee range of motion is 0-115. The knee is stable to exam with varus and valgus stressing. Negative anterior and posterior drawer. Negative Lachman´s.  Negative Ciera's test. Patella grind is positive, Negative for apprehension. Neurovascularly intact distally.    Images: . Images Reviewed and discussed with patient.    Assessment:  1. Primary osteoarthritis of left knee        Plan:  At this time we discussed his physical exam and previous imaging findings.  He is doing very well we will hold off on the gel injection.  He will continue low-impact activities, patient states he will call or return sooner if there is any new problems or difficulties.    Follow UP: No follow-ups on file.

## 2022-08-15 ENCOUNTER — HOSPITAL ENCOUNTER (OUTPATIENT)
Facility: HOSPITAL | Age: 77
Discharge: HOME | End: 2022-08-15
Attending: INTERNAL MEDICINE
Payer: MEDICARE

## 2022-08-15 VITALS
DIASTOLIC BLOOD PRESSURE: 83 MMHG | BODY MASS INDEX: 31.06 KG/M2 | HEART RATE: 79 BPM | HEIGHT: 69 IN | RESPIRATION RATE: 17 BRPM | WEIGHT: 209.69 LBS | OXYGEN SATURATION: 90 % | TEMPERATURE: 98 F | SYSTOLIC BLOOD PRESSURE: 119 MMHG

## 2022-08-15 DIAGNOSIS — I49.9 ARRHYTHMIA: ICD-10-CM

## 2022-08-15 DIAGNOSIS — I42.9 FAMILIAL CARDIOMYOPATHY: ICD-10-CM

## 2022-08-15 DIAGNOSIS — I25.10 CAD (CORONARY ARTERY DISEASE): ICD-10-CM

## 2022-08-15 PROCEDURE — 25000003 PHARM REV CODE 250: Performed by: INTERNAL MEDICINE

## 2022-08-15 PROCEDURE — C1882 AICD, OTHER THAN SING/DUAL: HCPCS | Performed by: INTERNAL MEDICINE

## 2022-08-15 PROCEDURE — 27201423 OPTIME MED/SURG SUP & DEVICES STERILE SUPPLY: Performed by: INTERNAL MEDICINE

## 2022-08-15 PROCEDURE — 93005 ELECTROCARDIOGRAM TRACING: CPT

## 2022-08-15 PROCEDURE — 93010 EKG 12-LEAD: ICD-10-PCS | Mod: ,,, | Performed by: INTERNAL MEDICINE

## 2022-08-15 PROCEDURE — 33264 RMVL & RPLCMT DFB GEN MLT LD: CPT | Performed by: INTERNAL MEDICINE

## 2022-08-15 PROCEDURE — 63600175 PHARM REV CODE 636 W HCPCS: Performed by: INTERNAL MEDICINE

## 2022-08-15 PROCEDURE — 93010 ELECTROCARDIOGRAM REPORT: CPT | Mod: ,,, | Performed by: INTERNAL MEDICINE

## 2022-08-15 PROCEDURE — 99152 MOD SED SAME PHYS/QHP 5/>YRS: CPT | Performed by: INTERNAL MEDICINE

## 2022-08-15 DEVICE — CARDIAC RESYNCHRONIZATION DEVICE, TIERED-THERAPY CARDIOVERTER/DEFIBRILLATOR VVED DDDRV
Type: IMPLANTABLE DEVICE | Site: CHEST  WALL | Status: FUNCTIONAL
Brand: QUADRA ASSURA MP™

## 2022-08-15 RX ORDER — ACETAMINOPHEN 325 MG/1
650 TABLET ORAL EVERY 4 HOURS PRN
Status: DISCONTINUED | OUTPATIENT
Start: 2022-08-15 | End: 2022-08-23 | Stop reason: HOSPADM

## 2022-08-15 RX ORDER — HYDROCODONE BITARTRATE AND ACETAMINOPHEN 5; 325 MG/1; MG/1
1 TABLET ORAL EVERY 4 HOURS PRN
Status: DISCONTINUED | OUTPATIENT
Start: 2022-08-15 | End: 2022-08-23 | Stop reason: HOSPADM

## 2022-08-15 RX ORDER — FUROSEMIDE 20 MG/1
20 TABLET ORAL EVERY MORNING
COMMUNITY
Start: 2022-07-16

## 2022-08-15 RX ORDER — VANCOMYCIN HYDROCHLORIDE 1 G/20ML
1000 INJECTION, POWDER, LYOPHILIZED, FOR SOLUTION INTRAVENOUS
Status: DISCONTINUED | OUTPATIENT
Start: 2022-08-15 | End: 2022-08-22

## 2022-08-15 RX ORDER — MIDAZOLAM HYDROCHLORIDE 1 MG/ML
INJECTION, SOLUTION INTRAMUSCULAR; INTRAVENOUS
Status: DISCONTINUED | OUTPATIENT
Start: 2022-08-15 | End: 2022-08-23 | Stop reason: HOSPADM

## 2022-08-15 RX ORDER — SODIUM CHLORIDE 9 MG/ML
INJECTION, SOLUTION INTRAVENOUS ONCE
Status: ACTIVE | OUTPATIENT
Start: 2022-08-15

## 2022-08-15 RX ORDER — SODIUM CHLORIDE 0.9 % (FLUSH) 0.9 %
10 SYRINGE (ML) INJECTION
Status: ACTIVE | OUTPATIENT
Start: 2022-08-15

## 2022-08-15 RX ORDER — LORATADINE 10 MG/1
10 TABLET ORAL DAILY
COMMUNITY
Start: 2022-07-16

## 2022-08-15 RX ORDER — AMIODARONE HYDROCHLORIDE 200 MG/1
1 TABLET ORAL DAILY
COMMUNITY
Start: 2022-07-02

## 2022-08-15 RX ORDER — DIPHENHYDRAMINE HYDROCHLORIDE 50 MG/ML
INJECTION INTRAMUSCULAR; INTRAVENOUS
Status: DISCONTINUED | OUTPATIENT
Start: 2022-08-15 | End: 2022-08-23 | Stop reason: HOSPADM

## 2022-08-15 RX ORDER — VANCOMYCIN HYDROCHLORIDE 1 G/20ML
INJECTION, POWDER, LYOPHILIZED, FOR SOLUTION INTRAVENOUS
Status: DISCONTINUED | OUTPATIENT
Start: 2022-08-15 | End: 2022-08-22

## 2022-08-15 RX ORDER — FENTANYL CITRATE 50 UG/ML
INJECTION, SOLUTION INTRAMUSCULAR; INTRAVENOUS
Status: DISCONTINUED | OUTPATIENT
Start: 2022-08-15 | End: 2022-08-23 | Stop reason: HOSPADM

## 2022-08-15 RX ORDER — LIDOCAINE HYDROCHLORIDE 20 MG/ML
INJECTION, SOLUTION INFILTRATION; PERINEURAL
Status: DISCONTINUED | OUTPATIENT
Start: 2022-08-15 | End: 2022-08-23 | Stop reason: HOSPADM

## 2022-08-15 NOTE — Clinical Note
The lead was connected to generator.      The chronic LV, RA and RV lead was disconnected from chronic generator and connected to a replacement chronic generator.

## 2022-08-15 NOTE — DISCHARGE SUMMARY
[facility] - Cath Lab Services  Discharge Note  Short Stay    Procedure(s) (LRB):  CHANGE, ICD, BIV, GENERATOR (N/A)    OUTCOME: Patient tolerated treatment/procedure well without complication and is now ready for discharge.    DISPOSITION: Home or Self Care    FINAL DIAGNOSIS:  Cardiomyopathy    FOLLOWUP: In clinic    DISCHARGE INSTRUCTIONS:  No discharge procedures on file.     TIME SPENT ON DISCHARGE: 10 minutes

## 2022-09-12 ENCOUNTER — LAB VISIT (OUTPATIENT)
Dept: LAB | Facility: HOSPITAL | Age: 77
End: 2022-09-12
Attending: FAMILY MEDICINE
Payer: MEDICARE

## 2022-09-12 DIAGNOSIS — Z79.899 ENCOUNTER FOR LONG-TERM (CURRENT) USE OF OTHER MEDICATIONS: ICD-10-CM

## 2022-09-12 DIAGNOSIS — I51.9 MYXEDEMA HEART DISEASE: Primary | ICD-10-CM

## 2022-09-12 DIAGNOSIS — E03.9 MYXEDEMA HEART DISEASE: Primary | ICD-10-CM

## 2022-09-12 LAB
FT4I SERPL CALC-MCNC: 2.59 (ref 2.6–3.6)
T3RU NFR SERPL: 36.85 % (ref 31–39)
T4 SERPL-MCNC: 7.04 UG/DL (ref 4.87–11.72)
TSH SERPL-ACNC: 9.3 UIU/ML (ref 0.35–4.94)

## 2022-09-12 PROCEDURE — 84479 ASSAY OF THYROID (T3 OR T4): CPT

## 2022-09-12 PROCEDURE — 36415 COLL VENOUS BLD VENIPUNCTURE: CPT

## 2022-09-12 PROCEDURE — 84443 ASSAY THYROID STIM HORMONE: CPT

## 2022-09-12 PROCEDURE — 84436 ASSAY OF TOTAL THYROXINE: CPT

## 2022-10-05 ENCOUNTER — OFFICE VISIT (OUTPATIENT)
Dept: ORTHOPEDICS | Facility: CLINIC | Age: 77
End: 2022-10-05
Payer: MEDICARE

## 2022-10-05 VITALS
DIASTOLIC BLOOD PRESSURE: 73 MMHG | HEART RATE: 94 BPM | SYSTOLIC BLOOD PRESSURE: 128 MMHG | TEMPERATURE: 98 F | WEIGHT: 208 LBS | HEIGHT: 69 IN | BODY MASS INDEX: 30.81 KG/M2

## 2022-10-05 DIAGNOSIS — M17.12 OSTEOARTHRITIS OF LEFT KNEE, UNSPECIFIED OSTEOARTHRITIS TYPE: Primary | ICD-10-CM

## 2022-10-05 PROCEDURE — 99499 UNLISTED E&M SERVICE: CPT | Mod: ,,, | Performed by: ORTHOPAEDIC SURGERY

## 2022-10-05 PROCEDURE — 20610 LARGE JOINT ASPIRATION/INJECTION: L KNEE: ICD-10-PCS | Mod: LT,,, | Performed by: ORTHOPAEDIC SURGERY

## 2022-10-05 PROCEDURE — 20610 DRAIN/INJ JOINT/BURSA W/O US: CPT | Mod: LT,,, | Performed by: ORTHOPAEDIC SURGERY

## 2022-10-05 PROCEDURE — 99499 NO LOS: ICD-10-PCS | Mod: ,,, | Performed by: ORTHOPAEDIC SURGERY

## 2022-10-05 RX ORDER — LIDOCAINE HYDROCHLORIDE 20 MG/ML
2 INJECTION, SOLUTION INFILTRATION; PERINEURAL
Status: DISCONTINUED | OUTPATIENT
Start: 2022-10-05 | End: 2022-10-05 | Stop reason: HOSPADM

## 2022-10-05 RX ADMIN — LIDOCAINE HYDROCHLORIDE 2 ML: 20 INJECTION, SOLUTION INFILTRATION; PERINEURAL at 10:10

## 2022-10-05 NOTE — PROCEDURES
Large Joint Aspiration/Injection: L knee    Date/Time: 10/5/2022 10:00 AM  Performed by: Mitch Rowe MD  Authorized by: Mitch Rowe MD     Consent Done?:  Yes (Verbal)  Indications:  Pain and arthritis  Site marked: the procedure site was marked    Timeout: prior to procedure the correct patient, procedure, and site was verified    Prep: patient was prepped and draped in usual sterile fashion      Local anesthesia used?: Yes    Local anesthetic:  Topical anesthetic and lidocaine 2% without epinephrine  Ultrasonic Guidance for needle placement?: No    Approach:  Anterolateral  Location:  Knee  Site:  L knee  Medications:  48 mg hylan g-f 20 48 mg/6 mL; 2 mL LIDOcaine HCL 20 mg/ml (2%) 20 mg/mL (2 %)  Patient tolerance:  Patient tolerated the procedure well with no immediate complications

## 2022-10-05 NOTE — PROGRESS NOTES
Subjective:    CC: Pain and Swelling of the Left Knee (Left knee pain and swelling  Has been going hunting and the knee started with pain again.  Taking OTC and home exercise which do help a little  )       HPI:  Patient returns today for repeat exam.  Patient continues to have some pain about his left knee.  He is ready to proceed with the gel injection.    ROS: Refer to HPI for pertinent ROS. All other 12 point systems negative.    Objective:    Physical Exam:  The patient is well-nourished, well-developed and in no apparent distress, pleasant and cooperative. Examination of the left lower extremity compartments are soft and warm. Skin is intact. There are no signs or symptoms of DVT or infection. There is no obvious joint effusion. There is no erythema. Tender to palpation along the medial joint line and patellofemoral joint , left knee range of motion is 5-115. The knee is stable to exam with varus and valgus stressing. Negative anterior and posterior drawer. Negative Lachman´s.  Negative Ciera's test. Patella grind is positive, Negative for apprehension. Neurovascularly intact distally.    Images: . Images Reviewed and discussed with patient.    Assessment:  1. Osteoarthritis of left knee, unspecified osteoarthritis type  - Large Joint Aspiration/Injection: L knee        Plan:  At this time we discussed his physical exam and previous imaging findings.  He tolerated gel injection very well to left knee, he will continue low-impact activities, like see back in 3 months to see how he is progressing.    Follow UP: No follow-ups on file.    Large Joint Aspiration/Injection: L knee    Date/Time: 10/5/2022 10:00 AM  Performed by: Mitch Rowe MD  Authorized by: Mitch Rowe MD     Consent Done?:  Yes (Verbal)  Indications:  Pain and arthritis  Site marked: the procedure site was marked    Timeout: prior to procedure the correct patient, procedure, and site was verified    Prep: patient was prepped and draped  in usual sterile fashion      Local anesthesia used?: Yes    Local anesthetic:  Topical anesthetic and lidocaine 2% without epinephrine  Ultrasonic Guidance for needle placement?: No    Approach:  Anterolateral  Location:  Knee  Site:  L knee  Medications:  48 mg hylan g-f 20 48 mg/6 mL; 2 mL LIDOcaine HCL 20 mg/ml (2%) 20 mg/mL (2 %)  Patient tolerance:  Patient tolerated the procedure well with no immediate complications

## 2022-10-05 NOTE — PROCEDURES
Large Joint Aspiration/Injection: L knee    Date/Time: 10/5/2022 10:00 AM  Performed by: Mitch Rowe MD  Authorized by: Mitch Rowe MD     Consent Done?:  Yes (Verbal)  Indications:  Pain  Site marked: the procedure site was marked    Prep: patient was prepped and draped in usual sterile fashion    Approach:  Anterolateral  Location:  Knee  Site:  L knee  Medications:  12 mg betamethasone acetate-betamethasone sodium phosphate 6 mg/mL; 3 mg LIDOcaine HCL 20 mg/ml (2%) 20 mg/mL (2 %)  Patient tolerance:  Patient tolerated the procedure well with no immediate complications

## 2022-10-05 NOTE — PROGRESS NOTES
Subjective:    CC: Pain and Swelling of the Left Knee (Left knee pain and swelling  Has been going hunting and the knee started with pain again.  Taking OTC and home exercise which do help a little  )       HPI: ***    ROS: Refer to HPI for pertinent ROS. All other 12 point systems negative.    Objective:    Physical Exam:***    Images: ***. Images Reviewed and discussed with patient.    Assessment:  1. Osteoarthritis of left knee, unspecified osteoarthritis type  - Large Joint Aspiration/Injection: L knee        Plan: ***    Follow UP: No follow-ups on file.    Large Joint Aspiration/Injection: L knee    Date/Time: 10/5/2022 10:00 AM  Performed by: Mitch Rowe MD  Authorized by: Mitch Rowe MD     Consent Done?:  Yes (Verbal)  Indications:  Pain  Site marked: the procedure site was marked    Prep: patient was prepped and draped in usual sterile fashion    Approach:  Anterolateral  Location:  Knee  Site:  L knee  Medications:  12 mg betamethasone acetate-betamethasone sodium phosphate 6 mg/mL; 3 mg LIDOcaine HCL 20 mg/ml (2%) 20 mg/mL (2 %)  Patient tolerance:  Patient tolerated the procedure well with no immediate complications

## 2022-12-07 ENCOUNTER — HOSPITAL ENCOUNTER (OUTPATIENT)
Dept: RADIOLOGY | Facility: HOSPITAL | Age: 77
Discharge: HOME | End: 2022-12-07
Attending: FAMILY MEDICINE
Payer: MEDICARE

## 2022-12-07 DIAGNOSIS — M54.50 RIGHT LOW BACK PAIN, UNSPECIFIED CHRONICITY, UNSPECIFIED WHETHER SCIATICA PRESENT: ICD-10-CM

## 2022-12-07 DIAGNOSIS — C67.9 MALIGNANT NEOPLASM OF URINARY BLADDER, UNSPECIFIED SITE: ICD-10-CM

## 2022-12-07 PROCEDURE — 76770 US EXAM ABDO BACK WALL COMP: CPT | Mod: TC

## 2022-12-19 ENCOUNTER — HOSPITAL ENCOUNTER (OUTPATIENT)
Dept: RADIOLOGY | Facility: HOSPITAL | Age: 77
Discharge: HOME | End: 2022-12-19
Attending: FAMILY MEDICINE
Payer: MEDICARE

## 2022-12-19 DIAGNOSIS — R07.9 CHEST PAIN, UNSPECIFIED TYPE: ICD-10-CM

## 2022-12-19 PROCEDURE — 71250 CT THORAX DX C-: CPT | Mod: TC

## 2023-01-18 ENCOUNTER — HOSPITAL ENCOUNTER (OUTPATIENT)
Dept: RADIOLOGY | Facility: HOSPITAL | Age: 78
Discharge: HOME | End: 2023-01-18
Attending: FAMILY MEDICINE
Payer: MEDICARE

## 2023-01-18 DIAGNOSIS — R07.81 RIB PAIN ON RIGHT SIDE: ICD-10-CM

## 2023-01-18 PROCEDURE — A9503 TC99M MEDRONATE: HCPCS

## 2023-01-18 PROCEDURE — 78306 BONE IMAGING WHOLE BODY: CPT | Mod: TC

## 2023-02-28 ENCOUNTER — LAB VISIT (OUTPATIENT)
Dept: LAB | Facility: HOSPITAL | Age: 78
End: 2023-02-28
Attending: FAMILY MEDICINE
Payer: MEDICARE

## 2023-02-28 DIAGNOSIS — Z12.5 SPECIAL SCREENING FOR MALIGNANT NEOPLASM OF PROSTATE: ICD-10-CM

## 2023-02-28 DIAGNOSIS — R73.09 ELEVATED GLUCOSE: ICD-10-CM

## 2023-02-28 DIAGNOSIS — Z00.00 ROUTINE GENERAL MEDICAL EXAMINATION AT A HEALTH CARE FACILITY: ICD-10-CM

## 2023-02-28 DIAGNOSIS — I25.10 CORONARY ATHEROSCLEROSIS OF NATIVE CORONARY ARTERY: ICD-10-CM

## 2023-02-28 DIAGNOSIS — E03.9 MYXEDEMA HEART DISEASE: Primary | ICD-10-CM

## 2023-02-28 DIAGNOSIS — E78.5 HYPERLIPIDEMIA, UNSPECIFIED HYPERLIPIDEMIA TYPE: ICD-10-CM

## 2023-02-28 DIAGNOSIS — I51.9 MYXEDEMA HEART DISEASE: Primary | ICD-10-CM

## 2023-02-28 LAB
ALBUMIN SERPL-MCNC: 3.8 G/DL (ref 3.4–4.8)
ALP SERPL-CCNC: 64 UNIT/L (ref 40–150)
ALT SERPL-CCNC: 26 UNIT/L (ref 0–55)
ANION GAP SERPL CALC-SCNC: 10 MEQ/L
AST SERPL-CCNC: 21 UNIT/L (ref 5–34)
BASOPHILS # BLD AUTO: 0.05 X10(3)/MCL (ref 0–0.2)
BASOPHILS NFR BLD AUTO: 0.8 %
BILIRUBIN DIRECT+TOT PNL SERPL-MCNC: 0.4 MG/DL (ref 0–?)
BILIRUBIN DIRECT+TOT PNL SERPL-MCNC: 0.8 MG/DL (ref 0–0.8)
BILIRUBIN DIRECT+TOT PNL SERPL-MCNC: 1.2 MG/DL
BUN SERPL-MCNC: 11.2 MG/DL (ref 8.4–25.7)
CALCIUM SERPL-MCNC: 9.3 MG/DL (ref 8.8–10)
CHLORIDE SERPL-SCNC: 101 MMOL/L (ref 98–107)
CHOLEST SERPL-MCNC: 130 MG/DL
CHOLEST/HDLC SERPL: 4 {RATIO} (ref 0–5)
CO2 SERPL-SCNC: 28 MMOL/L (ref 23–31)
CREAT SERPL-MCNC: 0.72 MG/DL (ref 0.73–1.18)
CREAT/UREA NIT SERPL: 16
EOSINOPHIL # BLD AUTO: 0.21 X10(3)/MCL (ref 0–0.9)
EOSINOPHIL NFR BLD AUTO: 3.4 %
ERYTHROCYTE [DISTWIDTH] IN BLOOD BY AUTOMATED COUNT: 14.7 % (ref 11.5–17)
EST. AVERAGE GLUCOSE BLD GHB EST-MCNC: 108.3 MG/DL
FT4I SERPL CALC-MCNC: 2.74 (ref 2.6–3.6)
GFR SERPLBLD CREATININE-BSD FMLA CKD-EPI: >60 MLS/MIN/1.73/M2
GLUCOSE SERPL-MCNC: 119 MG/DL (ref 82–115)
HBA1C MFR BLD: 5.4 %
HCT VFR BLD AUTO: 44.6 % (ref 42–52)
HDLC SERPL-MCNC: 33 MG/DL (ref 35–60)
HGB BLD-MCNC: 14.4 G/DL (ref 14–18)
IMM GRANULOCYTES # BLD AUTO: 0.02 X10(3)/MCL (ref 0–0.04)
IMM GRANULOCYTES NFR BLD AUTO: 0.3 %
LDLC SERPL CALC-MCNC: 60 MG/DL (ref 50–140)
LYMPHOCYTES # BLD AUTO: 1.11 X10(3)/MCL (ref 0.6–4.6)
LYMPHOCYTES NFR BLD AUTO: 17.8 %
MCH RBC QN AUTO: 32.1 PG
MCHC RBC AUTO-ENTMCNC: 32.3 G/DL (ref 33–36)
MCV RBC AUTO: 99.6 FL (ref 80–94)
MONOCYTES # BLD AUTO: 0.53 X10(3)/MCL (ref 0.1–1.3)
MONOCYTES NFR BLD AUTO: 8.5 %
NEUTROPHILS # BLD AUTO: 4.33 X10(3)/MCL (ref 2.1–9.2)
NEUTROPHILS NFR BLD AUTO: 69.2 %
PLATELET # BLD AUTO: 178 X10(3)/MCL (ref 130–400)
PMV BLD AUTO: 8.7 FL (ref 7.4–10.4)
POTASSIUM SERPL-SCNC: 4.1 MMOL/L (ref 3.5–5.1)
PROT SERPL-MCNC: 7 GM/DL (ref 5.8–7.6)
PSA SERPL-MCNC: 0.77 NG/ML
RBC # BLD AUTO: 4.48 X10(6)/MCL (ref 4.7–6.1)
SODIUM SERPL-SCNC: 139 MMOL/L (ref 136–145)
T3RU NFR SERPL: 33.85 % (ref 31–39)
T4 SERPL-MCNC: 8.1 UG/DL (ref 4.87–11.72)
TRIGL SERPL-MCNC: 186 MG/DL (ref 34–140)
TSH SERPL-ACNC: 10.06 UIU/ML (ref 0.35–4.94)
VLDLC SERPL CALC-MCNC: 37 MG/DL
WBC # SPEC AUTO: 6.3 X10(3)/MCL (ref 4.5–11.5)

## 2023-02-28 PROCEDURE — 80061 LIPID PANEL: CPT

## 2023-02-28 PROCEDURE — 80076 HEPATIC FUNCTION PANEL: CPT

## 2023-02-28 PROCEDURE — 85025 COMPLETE CBC W/AUTO DIFF WBC: CPT

## 2023-02-28 PROCEDURE — 84153 ASSAY OF PSA TOTAL: CPT

## 2023-02-28 PROCEDURE — 84443 ASSAY THYROID STIM HORMONE: CPT

## 2023-02-28 PROCEDURE — 36415 COLL VENOUS BLD VENIPUNCTURE: CPT

## 2023-02-28 PROCEDURE — 84479 ASSAY OF THYROID (T3 OR T4): CPT

## 2023-02-28 PROCEDURE — 84436 ASSAY OF TOTAL THYROXINE: CPT

## 2023-02-28 PROCEDURE — 83036 HEMOGLOBIN GLYCOSYLATED A1C: CPT

## 2023-02-28 PROCEDURE — 80048 BASIC METABOLIC PNL TOTAL CA: CPT

## 2023-06-21 ENCOUNTER — OFFICE VISIT (OUTPATIENT)
Dept: ORTHOPEDICS | Facility: CLINIC | Age: 78
End: 2023-06-21
Payer: MEDICARE

## 2023-06-21 VITALS — BODY MASS INDEX: 31.25 KG/M2 | WEIGHT: 211 LBS | HEIGHT: 69 IN

## 2023-06-21 DIAGNOSIS — M25.462 EFFUSION OF LEFT KNEE: ICD-10-CM

## 2023-06-21 DIAGNOSIS — M17.12 OSTEOARTHRITIS OF LEFT KNEE, UNSPECIFIED OSTEOARTHRITIS TYPE: Primary | ICD-10-CM

## 2023-06-21 PROCEDURE — 20610 DRAIN/INJ JOINT/BURSA W/O US: CPT | Mod: LT,,, | Performed by: ORTHOPAEDIC SURGERY

## 2023-06-21 PROCEDURE — 99213 PR OFFICE/OUTPT VISIT, EST, LEVL III, 20-29 MIN: ICD-10-PCS | Mod: 25,,, | Performed by: ORTHOPAEDIC SURGERY

## 2023-06-21 PROCEDURE — 20610 LARGE JOINT ASPIRATION/INJECTION: L KNEE: ICD-10-PCS | Mod: LT,,, | Performed by: ORTHOPAEDIC SURGERY

## 2023-06-21 PROCEDURE — 99213 OFFICE O/P EST LOW 20 MIN: CPT | Mod: 25,,, | Performed by: ORTHOPAEDIC SURGERY

## 2023-06-21 RX ORDER — BETAMETHASONE SODIUM PHOSPHATE AND BETAMETHASONE ACETATE 3; 3 MG/ML; MG/ML
12 INJECTION, SUSPENSION INTRA-ARTICULAR; INTRALESIONAL; INTRAMUSCULAR; SOFT TISSUE
Status: DISCONTINUED | OUTPATIENT
Start: 2023-06-21 | End: 2023-06-21 | Stop reason: HOSPADM

## 2023-06-21 RX ORDER — LIDOCAINE HYDROCHLORIDE 20 MG/ML
3 INJECTION, SOLUTION INFILTRATION; PERINEURAL
Status: DISCONTINUED | OUTPATIENT
Start: 2023-06-21 | End: 2023-06-21 | Stop reason: HOSPADM

## 2023-06-21 RX ADMIN — LIDOCAINE HYDROCHLORIDE 3 MG: 20 INJECTION, SOLUTION INFILTRATION; PERINEURAL at 08:06

## 2023-06-21 RX ADMIN — BETAMETHASONE SODIUM PHOSPHATE AND BETAMETHASONE ACETATE 12 MG: 3; 3 INJECTION, SUSPENSION INTRA-ARTICULAR; INTRALESIONAL; INTRAMUSCULAR; SOFT TISSUE at 08:06

## 2023-06-21 NOTE — PROCEDURES
Large Joint Aspiration/Injection: L knee    Date/Time: 6/21/2023 8:00 AM  Performed by: Mitch Rowe MD  Authorized by: Mitch Rowe MD     Consent Done?:  Yes (Verbal)  Indications:  Pain  Site marked: the procedure site was marked    Prep: patient was prepped and draped in usual sterile fashion    Approach:  Anterolateral  Location:  Knee  Site:  L knee  Medications:  12 mg betamethasone acetate-betamethasone sodium phosphate 6 mg/mL; 3 mg LIDOcaine HCL 20 mg/ml (2%) 20 mg/mL (2 %)  Patient tolerance:  Patient tolerated the procedure well with no immediate complications

## 2023-06-21 NOTE — PROGRESS NOTES
"Subjective:    CC: Pain of the Left Knee and Pain (L knee pain - previous synvisc injection 10/5/22 - pt states that the injection helped his knee pain - he would like another one today - td)       HPI:  Patient returns today for repeat exam.  Patient states he is had increasing pain, swelling in his left knee after going to the camp, doing a lot of walking up the ramp.  He states he is not improved over the last couple of weeks.    ROS: Refer to HPI for pertinent ROS. All other 12 point systems negative.    Objective:  Vitals:    06/21/23 0803   Weight: 95.7 kg (211 lb)   Height: 5' 9" (1.753 m)        Physical Exam:  The patient is well-nourished, well-developed and in no apparent distress, pleasant and cooperative. Examination of the left lower extremity compartments are soft and warm. Skin is intact. There are no signs or symptoms of DVT or infection. There is a moderate joint effusion. There is no erythema. Tender to palpation along the medial and lateral joint line , left knee range of motion is 5-110. The knee is stable to exam with varus and valgus stressing. Negative anterior and posterior drawer. Negative Lachman´s.  Negative Ciera's test. Patella grind is positive, Negative for apprehension. Neurovascularly intact distally.    Images: . Images Reviewed and discussed with patient.    Assessment:  1. Osteoarthritis of left knee, unspecified osteoarthritis type  - Large Joint Aspiration/Injection: L knee    2. Effusion of left knee  - Large Joint Aspiration/Injection: L knee        Plan:  At this time we discussed his physical exam and previous imaging findings.  Under sterile technique he tolerated aspiration of his left knee; 40 cc serous.  He also received a steroid lidocaine injection this same superolateral portal.  He tolerated this very well.  We have discussed low-impact activities, knee exercises.  Patient understands to call or return sooner if there is any new problems or " difficulties.    Follow UP: No follow-ups on file.    Large Joint Aspiration/Injection: L knee    Date/Time: 6/21/2023 8:00 AM  Performed by: Mitch Rowe MD  Authorized by: Mitch Rowe MD     Consent Done?:  Yes (Verbal)  Indications:  Pain  Site marked: the procedure site was marked    Prep: patient was prepped and draped in usual sterile fashion    Approach:  Anterolateral  Location:  Knee  Site:  L knee  Medications:  12 mg betamethasone acetate-betamethasone sodium phosphate 6 mg/mL; 3 mg LIDOcaine HCL 20 mg/ml (2%) 20 mg/mL (2 %)  Patient tolerance:  Patient tolerated the procedure well with no immediate complications

## 2023-07-05 ENCOUNTER — OFFICE VISIT (OUTPATIENT)
Dept: ORTHOPEDICS | Facility: CLINIC | Age: 78
End: 2023-07-05
Payer: MEDICARE

## 2023-07-05 VITALS
BODY MASS INDEX: 31.25 KG/M2 | SYSTOLIC BLOOD PRESSURE: 137 MMHG | HEIGHT: 69 IN | HEART RATE: 86 BPM | WEIGHT: 211 LBS | TEMPERATURE: 98 F | DIASTOLIC BLOOD PRESSURE: 80 MMHG

## 2023-07-05 DIAGNOSIS — M25.462 EFFUSION OF LEFT KNEE: ICD-10-CM

## 2023-07-05 DIAGNOSIS — M17.12 PRIMARY OSTEOARTHRITIS OF LEFT KNEE: Primary | ICD-10-CM

## 2023-07-05 PROCEDURE — 20610 LARGE JOINT ASPIRATION/INJECTION: L KNEE: ICD-10-PCS | Mod: LT,,, | Performed by: ORTHOPAEDIC SURGERY

## 2023-07-05 PROCEDURE — 99213 OFFICE O/P EST LOW 20 MIN: CPT | Mod: 25,,, | Performed by: ORTHOPAEDIC SURGERY

## 2023-07-05 PROCEDURE — 20610 DRAIN/INJ JOINT/BURSA W/O US: CPT | Mod: LT,,, | Performed by: ORTHOPAEDIC SURGERY

## 2023-07-05 PROCEDURE — 99213 PR OFFICE/OUTPT VISIT, EST, LEVL III, 20-29 MIN: ICD-10-PCS | Mod: 25,,, | Performed by: ORTHOPAEDIC SURGERY

## 2023-07-05 RX ORDER — LIDOCAINE HYDROCHLORIDE 20 MG/ML
2 INJECTION, SOLUTION INFILTRATION; PERINEURAL
Status: DISCONTINUED | OUTPATIENT
Start: 2023-07-05 | End: 2023-07-05 | Stop reason: HOSPADM

## 2023-07-05 RX ADMIN — LIDOCAINE HYDROCHLORIDE 2 ML: 20 INJECTION, SOLUTION INFILTRATION; PERINEURAL at 09:07

## 2023-07-05 NOTE — PROGRESS NOTES
"Subjective:    CC: Pain and Swelling of the Left Knee (Continues to have swelling and pain in the front and back  Of the knee. Cortisone done on 6/21/23. Takes OTC which helps a little. Home exercise which does not help. Using ice and doing massages.)       HPI:  Patient returns today for his left knee.  Patient had a previous aspiration injection 2 weeks ago.  Patient states the swelling and pain has returned.  He continues to have pain with long standing walking and bending.  He is taking over-the-counter medication without relief.    ROS: Refer to HPI for pertinent ROS. All other 12 point systems negative.    Objective:  Vitals:    07/05/23 0851   BP: 137/80   BP Location: Left arm   Patient Position: Sitting   BP Method: Medium (Automatic)   Pulse: 86   Temp: 97.9 °F (36.6 °C)   Weight: 95.7 kg (211 lb)   Height: 5' 9" (1.753 m)        Physical Exam:  The patient is well-nourished, well-developed and in no apparent distress, pleasant and cooperative. Examination of the left lower extremity compartments are soft and warm. Skin is intact. There are no signs or symptoms of DVT or infection. There is a small joint effusion. There is no erythema. Tender to palpation along the medial joint line and patellofemoral joint , left knee range of motion is 0-110. The knee is stable to exam with varus and valgus stressing. Negative anterior and posterior drawer. Negative Lachman´s.  Negative Ciera's test. Patella grind is positive, Negative for apprehension. Neurovascularly intact distally.    Images: . Images Reviewed and discussed with patient.    Assessment:  1. Primary osteoarthritis of left knee  - Prior authorization Order  - Large Joint Aspiration/Injection: L knee    2. Effusion of left knee        Plan:  At this time we discussed his physical exam and previous imaging findings.  We have discussed repeating his images.  We have discussed his likely aggravation of his underlying arthritis.  He tolerated his gel " injection very well to the left knee, he will continue low-impact activities.  I would like see him back for his scheduled appointment in 2 months.    Follow UP: No follow-ups on file.    Large Joint Aspiration/Injection: L knee    Date/Time: 7/5/2023 9:00 AM  Performed by: Mitch Rowe MD  Authorized by: Mitch Rowe MD     Consent Done?:  Yes (Verbal)  Indications:  Pain and arthritis  Site marked: the procedure site was marked    Timeout: prior to procedure the correct patient, procedure, and site was verified    Prep: patient was prepped and draped in usual sterile fashion      Local anesthesia used?: Yes    Local anesthetic:  Topical anesthetic and lidocaine 2% without epinephrine  Ultrasonic Guidance for needle placement?: No    Approach:  Anterolateral  Location:  Knee  Site:  L knee  Medications:  48 mg hylan g-f 20 48 mg/6 mL; 2 mL LIDOcaine HCL 20 mg/ml (2%) 20 mg/mL (2 %)  Patient tolerance:  Patient tolerated the procedure well with no immediate complications

## 2023-07-05 NOTE — PROCEDURES
Large Joint Aspiration/Injection: L knee    Date/Time: 7/5/2023 9:00 AM  Performed by: Mitch Rowe MD  Authorized by: Mitch Rowe MD     Consent Done?:  Yes (Verbal)  Indications:  Pain and arthritis  Site marked: the procedure site was marked    Timeout: prior to procedure the correct patient, procedure, and site was verified    Prep: patient was prepped and draped in usual sterile fashion      Local anesthesia used?: Yes    Local anesthetic:  Topical anesthetic and lidocaine 2% without epinephrine  Ultrasonic Guidance for needle placement?: No    Approach:  Anterolateral  Location:  Knee  Site:  L knee  Medications:  48 mg hylan g-f 20 48 mg/6 mL; 2 mL LIDOcaine HCL 20 mg/ml (2%) 20 mg/mL (2 %)  Patient tolerance:  Patient tolerated the procedure well with no immediate complications

## 2023-09-28 ENCOUNTER — TELEPHONE (OUTPATIENT)
Dept: ORTHOPEDICS | Facility: CLINIC | Age: 78
End: 2023-09-28
Payer: MEDICARE

## 2023-10-11 ENCOUNTER — OFFICE VISIT (OUTPATIENT)
Dept: ORTHOPEDICS | Facility: CLINIC | Age: 78
End: 2023-10-11
Payer: MEDICARE

## 2023-10-11 VITALS
SYSTOLIC BLOOD PRESSURE: 130 MMHG | WEIGHT: 211 LBS | DIASTOLIC BLOOD PRESSURE: 74 MMHG | BODY MASS INDEX: 31.25 KG/M2 | HEART RATE: 80 BPM | HEIGHT: 69 IN

## 2023-10-11 DIAGNOSIS — M17.12 PRIMARY OSTEOARTHRITIS OF LEFT KNEE: Primary | ICD-10-CM

## 2023-10-11 PROCEDURE — 99213 PR OFFICE/OUTPT VISIT, EST, LEVL III, 20-29 MIN: ICD-10-PCS | Mod: ,,,

## 2023-10-11 PROCEDURE — 99213 OFFICE O/P EST LOW 20 MIN: CPT | Mod: ,,,

## 2023-10-11 NOTE — PROGRESS NOTES
Subjective:    CC: Injections of the Left Knee and Injections (L knee pain - inj 7/5/23 - pt states that he has good and bad days. He states that his knee is about the same. )       HPI:  Patient presents to clinic for repeat evaluation of left knee.  Status post left knee gel injection on 07/05/2023.  Patient states he did not notice any relief with this injection.  He states that his knee continues to have good and bad days.  No pain at rest but increases with walking.  He states he does walk daily.  No significant changes from a knee standpoint since last visit.  Patient did however have a Mohs procedure on Tuesday of last week.  Patient is on blood thinner.    ROS: Refer to HPI for pertinent ROS. All other 12 point systems negative.    Objective:    Vitals:    10/11/23 0914   BP: 130/74   Pulse: 80        Physical Exam: The patient is well-nourished, well-developed and in no apparent distress, pleasant and cooperative. Examination of the left lower extremity compartments are soft and warm. Skin is intact. There are no signs or symptoms of DVT or infection. There is no joint effusion. There is no erythema. Tender to palpation along the anterior l joint line and patellofemoral joint , left knee range of motion is 5-110. The knee is stable to exam with varus and valgus stressing. Negative anterior and posterior drawer. Negative Lachman´s.  Negative Ciera's test. Patella grind is positive, Negative for apprehension. Neurovascularly intact distally.    Images:  X-rays:  Three views of the left knee demonstrate mild underlying arthritis.  Kg grade 2.  No obvious fracture dislocation.  Images Reviewed and discussed with patient.    Assessment:  1. Primary osteoarthritis of left knee  - X-Ray Knee 3 View Left       Plan:  Physical exam and imaging findings discussed with patient.  Patient would ultimately like to receive a steroid injection however due to his recent surgery I would like dermatology to clear him 1st.   Patient has an upcoming appointment on Tuesday and will call once okay to receive steroid injection.  In the meantime continue low-impact activities and topical anti-inflammatories as needed with appropriate precautions.    Follow up: No follow-ups on file.

## 2023-10-18 ENCOUNTER — TELEPHONE (OUTPATIENT)
Dept: ORTHOPEDICS | Facility: CLINIC | Age: 78
End: 2023-10-18
Payer: MEDICARE

## 2023-10-19 NOTE — TELEPHONE ENCOUNTER
I called the patient and scheduled him for a LT knee steroid injection on 10/25/23.     He verbalized a clear understanding.

## 2023-10-25 ENCOUNTER — OFFICE VISIT (OUTPATIENT)
Dept: ORTHOPEDICS | Facility: CLINIC | Age: 78
End: 2023-10-25
Payer: MEDICARE

## 2023-10-25 VITALS
HEIGHT: 69 IN | SYSTOLIC BLOOD PRESSURE: 126 MMHG | WEIGHT: 211 LBS | BODY MASS INDEX: 31.25 KG/M2 | DIASTOLIC BLOOD PRESSURE: 74 MMHG | HEART RATE: 85 BPM

## 2023-10-25 DIAGNOSIS — M17.12 PRIMARY OSTEOARTHRITIS OF LEFT KNEE: Primary | ICD-10-CM

## 2023-10-25 PROCEDURE — 99213 PR OFFICE/OUTPT VISIT, EST, LEVL III, 20-29 MIN: ICD-10-PCS | Mod: ,,, | Performed by: ORTHOPAEDIC SURGERY

## 2023-10-25 PROCEDURE — 99213 OFFICE O/P EST LOW 20 MIN: CPT | Mod: ,,, | Performed by: ORTHOPAEDIC SURGERY

## 2023-10-25 NOTE — PROGRESS NOTES
"Subjective:    CC: Injections of the Left Knee (Left knee cortisone inj)       HPI:  Patient returns today for repeat exam.  Patient states the left gel injection to his knee has helped, this is a proximally 3 months out.  He denies any new complaints today.    ROS: Refer to [facility] for pertinent ROS. All other 12 point systems negative.    Objective:  Vitals:    10/25/23 0853   BP: 126/74   Pulse: 85   Weight: 95.7 kg (211 lb)   Height: 5' 9" (1.753 m)        Physical Exam:  The patient is well-nourished, well-developed and in no apparent distress, pleasant and cooperative. Examination of the left lower extremity compartments are soft and warm. Skin is intact. There are no signs or symptoms of DVT or infection. There is no obvious joint effusion. There is no erythema. Tender to palpation along the medial joint line and patellofemoral joint , left knee range of motion is 0-105. The knee is stable to exam with varus and valgus stressing. Negative anterior and posterior drawer. Negative Lachman´s.  Negative Ciera's test. Patella grind is positive, Negative for apprehension. Neurovascularly intact distally.    Images: . Images Reviewed and discussed with patient.    Assessment:  1. Primary osteoarthritis of left knee  - Prior authorization Order        Plan:  At this time we discussed his physical exam and previous imaging findings.  We have discussed his underlying arthritis, he is done well with the gel injections, we will plan to repeat this at the six-month maggy.  In the meantime he will continue low-impact activities.    Follow UP: No follow-ups on file.              "

## 2023-11-30 ENCOUNTER — OFFICE VISIT (OUTPATIENT)
Dept: ORTHOPEDICS | Facility: CLINIC | Age: 78
End: 2023-11-30
Payer: MEDICARE

## 2023-11-30 ENCOUNTER — HOSPITAL ENCOUNTER (OUTPATIENT)
Dept: RADIOLOGY | Facility: CLINIC | Age: 78
Discharge: HOME | End: 2023-11-30
Payer: MEDICARE

## 2023-11-30 VITALS
WEIGHT: 211 LBS | DIASTOLIC BLOOD PRESSURE: 88 MMHG | SYSTOLIC BLOOD PRESSURE: 156 MMHG | BODY MASS INDEX: 31.25 KG/M2 | HEART RATE: 81 BPM | HEIGHT: 69 IN

## 2023-11-30 DIAGNOSIS — M54.30 SCIATIC LEG PAIN: ICD-10-CM

## 2023-11-30 DIAGNOSIS — M25.551 RIGHT HIP PAIN: ICD-10-CM

## 2023-11-30 DIAGNOSIS — M25.551 RIGHT HIP PAIN: Primary | ICD-10-CM

## 2023-11-30 PROCEDURE — 99213 PR OFFICE/OUTPT VISIT, EST, LEVL III, 20-29 MIN: ICD-10-PCS | Mod: ,,,

## 2023-11-30 PROCEDURE — 73502 XR HIP WITH PELVIS WHEN PERFORMED, 2 OR 3  VIEWS RIGHT: ICD-10-PCS | Mod: RT,,,

## 2023-11-30 PROCEDURE — 99213 OFFICE O/P EST LOW 20 MIN: CPT | Mod: ,,,

## 2023-11-30 PROCEDURE — 73502 X-RAY EXAM HIP UNI 2-3 VIEWS: CPT | Mod: RT,,,

## 2023-11-30 NOTE — PROGRESS NOTES
Subjective:    CC: Pain of the Right Hip (R hip pain - pt states that his hip started hurting about a month ago and has stayed hurting. Denies any falls or injury to his hip. )       HPI:  Patient presents to clinic for evaluation of right hip pain.  He states that about a month ago he started noticing pain about his posterior back about the SI joint that radiates down the leg.  He denies any inciting events, falls or trauma.  He denies any anterior groin pain or lateral hip pain.  He states he has pain with long car rides.  Currently taking Tylenol when needed.  He does have a history of previous surgery to his left SI joint with a back doctor 20+ years ago.  He states he is not had follow up with his back doctor in many years.    ROS: Refer to HPI for pertinent ROS. All other 12 point systems negative.    Objective:    Vitals:    11/30/23 1309   BP: (!) 156/88   Pulse: 81        Physical Exam:  Right lower extremity compartments are soft and warm.  Skin is intact.  There are no signs or symptoms of DVT or infection.  There is no swelling or erythema. Patient is tender to palpation about the SI joint; nontender to palpation about the lateral hip or elsewhere about the hip.  Appropriate range of motion of the hip joint.  No pain with resisted hip abduction There is no crepitance or instability. Stable to stressing. The patient has a negative Stinchfield exam. No long tract signs. Neurovascularly intact distally.       Images:  X-rays: Three views of the right hip demonstrate underlying arthritis.  No obvious fracture dislocation.  Images Reviewed and discussed with patient.    Assessment:  1. Right hip pain  - X-Ray Hip 2 or 3 views Right (with Pelvis when performed); Future    2. Sciatic leg pain       Plan:  Physical exam and imaging findings discussed with patient.  I do not believe his hip is attributing to his pain today.  I believe it is more likely that this is due to sciatic issues.  Exercises given today.   I have also given a referral to Dr. Martins and excess office for evaluation and management.  Continue Tylenol as needed with appropriate precautions.  I would like to see the patient back at his scheduled appointment in January for left knee gel injection at that time.    Follow up: No follow-ups on file.

## 2024-02-15 ENCOUNTER — OFFICE VISIT (OUTPATIENT)
Dept: PAIN MEDICINE | Facility: CLINIC | Age: 79
End: 2024-02-15
Payer: MEDICARE

## 2024-02-15 VITALS
HEART RATE: 82 BPM | DIASTOLIC BLOOD PRESSURE: 69 MMHG | WEIGHT: 211 LBS | BODY MASS INDEX: 31.25 KG/M2 | HEIGHT: 69 IN | SYSTOLIC BLOOD PRESSURE: 126 MMHG

## 2024-02-15 DIAGNOSIS — M54.30 SCIATICA, UNSPECIFIED LATERALITY: Primary | ICD-10-CM

## 2024-02-15 PROCEDURE — 99204 OFFICE O/P NEW MOD 45 MIN: CPT | Mod: ,,, | Performed by: NURSE PRACTITIONER

## 2024-02-15 RX ORDER — NITROGLYCERIN 0.4 MG/1
0.4 TABLET SUBLINGUAL
COMMUNITY

## 2024-02-15 RX ORDER — RIVAROXABAN 20 MG/1
20 TABLET, FILM COATED ORAL
COMMUNITY
Start: 2024-01-09

## 2024-02-15 RX ORDER — OMEPRAZOLE 20 MG/1
20 TABLET, DELAYED RELEASE ORAL
COMMUNITY

## 2024-02-15 NOTE — PROGRESS NOTES
Subjective:      Patient ID: Babak Scales is a 78 y.o. male.    Chief Complaint: Low-back Pain (Referral from Dr. Rowe for sciatic pain C/O pain level  6, Taking pain meds, pain started 3 months ago, had operation on Lt side of back 1994, no new sx or injury.)    Referred by: No ref. provider found     HPI:  This is a pleasant 78-year-old male patient who was presenting as a new consult from Dr. Rowe's nurse practitioner , VISH Cisneros to evaluate and treat sciatica.  To this consult patient was evaluated by ortho for right hip pain which started hurting approximately a month ago.  Additionally he has started noticing posterior back pain around the right SI joint that radiates down the leg a proximally 3 months ago without an injury.  He was also recently diagnosed with bladder cancer and will start treatments with his oncologist on 02/26/2024.  His urologist is located at [facility] in [facility]..  He denies any inciting events, falls or trauma.  He denies any anterior groin pain or lateral hip pain.  He states his pain will elevate with long car rides.  He currently takes Tylenol when needed.  He has pertinent past surgical history of left SI joint .  with the doctor proximally 20 years ago.  Patient stated he has not followed up with this physician in many years.  He has not completed physical therapy for this new pain and states the majority of his pain is to the right low back and sometimes in the upper buttock with rare posterior upper thigh radiation.  Pain will elevate to 10/10 with standing for longer periods of time sometimes exercise helps and sometimes it exacerbates it with increased activity.  He will have no pain with reclining and ironically he has no pain when he goes hunting as long as he is wearing his boots.  He denies any current pain medications and has been given a 4% codeine gel in the past that is not effective to relieve this pain which he describes as  "sharp.    Pertinent past medical history of CHF, CAD and atrial fibrillation (Xarelto managed by Dr. Gil + Dr. Rutherford) , bladder and skin cancer ([facility]rolog[facility])    Pertinent surgical history of defibrillator placement + cervcal spinal fusion and left S1 joint fusion.     Vital signs:   Vitals:    02/15/24 0855 02/15/24 0858   BP: 126/69    Pulse: 82    Weight: 95.7 kg (211 lb)    Height: 5' 9" (1.753 m)    PainSc:    6     Body mass index is 31.16 kg/m².  Pain Disability Index (PDI): 37       Interventional Pain History      ROS:  Low back and sciatic pain on right    MRI LUMBAR SPINE  None on file  Defibrillator (unable to have MRI)        Objective:          Physical Exam    General: Well developed; overweight; A&O x 3; No anxiety/depression; NAD  Mental Status: Oriented to person, palce and time. Displays appropriate mood & affect.  Head: Norm cephalic and atraumatic  Neck:  No cervical paraspinal banding.  Full range of motion with lateral turning and cervical flexion +extension.  Eyes: normal conjunctiva, normal lids, normal pupils  ENT and mouth: normal external ear, nose, and no lesions noted on the lips.  Respiratory: Symmetrical, Unlabored. No dyspnea  CV: normal rhythm and rate. No peripheral edema.   Abdomen: Non-distended    Extremities:  Gen: No cyanosis or tenderness to palpation bilateral upper and lower extremities  Skin: Warm, pink, dry, no rashes, no lesions on the lumbar spine  Strength: 5/5 motor strength bilateral upper and lower extremities  ROM: Full ROM in bilateral knees and ankles without pain or instability.    Neuro:  Gait: no altalgic lean, normal toe and heel raise. Independent ambulator.  DTR's: 2+ in bilateral patellar,   Sensory: Intact to light touch bilateral  upper and lower extremities    Spine: Normal lordosis. No scoliosis  L-spine ROM:  Pain and limited ROM to flexion, extension, bilateral rotation,   Straight Leg Raise: negative bilaterally.  SI Joint: No " tenderness to palpation bilaterally.  Negative JACEK.  Bilateral        Assessment:   Assessment:  his is a pleasant 78-year-old male patient who was presenting as a new consult from Dr. Rowe's nurse practitioner , VISH Cisneros to evaluate and treat sciatica.  To this consult patient was evaluated by ortho for right hip pain which started hurting approximately a month ago.  Additionally he has started noticing posterior back pain around the right SI joint that radiates down the leg a proximally 3 months ago without an injury.  He was also recently diagnosed with bladder cancer and will start treatments with his oncologist on 02/26/2024.  His urologist is located at [facility] in [facility]..  He denies any inciting events, falls or trauma.  He denies any anterior groin pain or lateral hip pain.  He states his pain will elevate with long car rides.  He currently takes Tylenol when needed.  He has pertinent past surgical history of left SI joint .  with the doctor proximally 20 years ago.  Patient stated he has not followed up with this physician in many years.  He has not completed physical therapy for this new pain and states the majority of his pain is to the right low back and sometimes in the upper buttock with rare posterior upper thigh radiation.  Pain will elevate to 10/10 with standing for longer periods of time sometimes exercise helps and sometimes it exacerbates it with increased activity.  He will have no pain with reclining and ironically he has no pain when he goes hunting as long as he is wearing his boots.  He denies any current pain medications and has been given a 4% codeine gel in the past that is not effective to relieve this pain which he describes as sharp.    Pertinent past medical history of CHF, CAD and atrial fibrillation (Xarelto managed by Dr. Gil + Dr. Rutherford) , bladder and skin cancer ([facility] urologist group)    Pertinent surgical history of defibrillator placement  + cervcal spinal fusion and left S1 joint fusion.     Plan of care:  Pt to start radiation treatments for bladder cancer on 2/26/2024 X 6 weeks  Pt will call back with PT location. Order will be placed once received.   Future consideration to treat sciatica with Gabapentin or alternate med.  Future consideration right SI joint xray.               Encounter Diagnosis   Name Primary?    Sciatica, unspecified laterality Yes         Plan:       Babak was seen today for low-back pain.    Diagnoses and all orders for this visit:    Sciatica, unspecified laterality               Past Medical History:   Diagnosis Date    Arthritis     Asthma     Atrial fibrillation     Bladder cancer     Bladder disorder     CAD (coronary artery disease)     CHF (congestive heart failure)     COPD (chronic obstructive pulmonary disease)     Diverticulitis     GERD (gastroesophageal reflux disease)     Heart damage     Insomnia     Skin cancer     Thyroid disease        Past Surgical History:   Procedure Laterality Date    BLADDER SURGERY      CARDIAC DEFIBRILLATOR PLACEMENT      CHOLECYSTECTOMY      EXCISIONAL HEMORRHOIDECTOMY      REPLACEMENT OF IMPLANTABLE CARDIOVERTER-DEFIBRILLATOR (ICD) BATTERY  07/2022    skin cancer removal Right     skin underneath the eye    SPINAL FUSION         Family History   Problem Relation Age of Onset    No Known Problems Mother     Cancer Father        Social History     Socioeconomic History    Marital status:    Tobacco Use    Smoking status: Former     Current packs/day: 1.00     Average packs/day: 1 pack/day for 20.0 years (20.0 ttl pk-yrs)     Types: Cigarettes    Smokeless tobacco: Never    Tobacco comments:     Quit 30 years ago   Substance and Sexual Activity    Alcohol use: Yes     Alcohol/week: 2.0 standard drinks of alcohol     Types: 2 Cans of beer per week    Drug use: Never    Sexual activity: Not Currently       Current Outpatient Medications   Medication Sig Dispense Refill     albuterol (VENTOLIN HFA) 90 mcg/actuation inhaler Inhale 2 puffs into the lungs every 6 (six) hours as needed for Wheezing. Rescue 18 g 11    alfuzosin (UROXATRAL) 10 mg Tb24 Take 10 mg by mouth once daily.      amiodarone (PACERONE) 200 MG Tab Take 1 tablet by mouth Daily.      atorvastatin (LIPITOR) 40 MG tablet Take 40 mg by mouth every evening.      digoxin (LANOXIN) 125 mcg tablet Take 125 mcg by mouth once daily.      ENTRESTO 24-26 mg per tablet Take 1 tablet by mouth 2 (two) times daily.      furosemide (LASIX) 20 MG tablet Take 20 mg by mouth every morning.      levalbuterol (XOPENEX HFA) 45 mcg/actuation inhaler See Instructions, INHALE 2 PUFFS BY MOUTH EVERY FOUR (4) HOURS AS NEEDED FOR WHEEZING, # 15 cap(s), 5 Refill(s), Pharmacy: [facility], 167, cm, Height/Length Dosing, 02/14/21 14:28:00 CST, 74, kg, Weight Dosing, 02/14/21 14:28:00 CST 15 g 1    levothyroxine (SYNTHROID) 50 MCG tablet Take 50 mcg by mouth once daily.      loratadine (CLARITIN) 10 mg tablet Take 10 mg by mouth once daily.      nitroGLYCERIN (NITROSTAT) 0.4 MG SL tablet Place 0.4 mg under the tongue.      omeprazole (PRILOSEC OTC) 20 MG tablet Take 20 mg by mouth.      omeprazole (PRILOSEC) 20 MG capsule Take 20 mg by mouth once daily.      TRELEGY ELLIPTA 100-62.5-25 mcg DsDv Inhale 1 puff into the lungs once daily. 60 each 11    XARELTO 20 mg Tab Take 20 mg by mouth.       No current facility-administered medications for this visit.     Facility-Administered Medications Ordered in Other Visits   Medication Dose Route Frequency Provider Last Rate Last Admin    0.9%  NaCl infusion   Intravenous Once Jono Rutherford MD        sodium chloride 0.9% flush 10 mL  10 mL Intravenous PRN Jono Rutherford MD           Review of patient's allergies indicates:   Allergen Reactions    Aspirin      GI distress with med, hx gastric ulcer

## 2024-02-21 ENCOUNTER — OFFICE VISIT (OUTPATIENT)
Dept: ORTHOPEDICS | Facility: CLINIC | Age: 79
End: 2024-02-21
Payer: MEDICARE

## 2024-02-21 DIAGNOSIS — M17.12 PRIMARY OSTEOARTHRITIS OF LEFT KNEE: Primary | ICD-10-CM

## 2024-02-21 PROCEDURE — 99213 OFFICE O/P EST LOW 20 MIN: CPT | Mod: ,,, | Performed by: ORTHOPAEDIC SURGERY

## 2024-02-21 NOTE — PROGRESS NOTES
Subjective:    CC: Injections of the Left Knee (Pt states he is unsure about getting injection today. States he's been wearing a brace and has been pain free. )       HPI:  Patient returns today for repeat exam.  Patient states his left knee is doing better.  He has using a topical cream, been using a compression knee sleeve, he states he has no pain in his knee today.    ROS: Refer to HPI for pertinent ROS. All other 12 point systems negative.    Objective:  There were no vitals filed for this visit.     Physical Exam:  The patient is well-nourished, well-developed and in no apparent distress, pleasant and cooperative. Examination of the left lower extremity compartments are soft and warm. Skin is intact. There are no signs or symptoms of DVT or infection. There is no obvious joint effusion. There is no erythema. Tender to palpation along the anterior aspect, minimal today , left knee range of motion is 0-110. The knee is stable to exam with varus and valgus stressing. Negative anterior and posterior drawer. Negative Lachman´s.  Negative Ciera's test. Patella grind is positive, Negative for apprehension. Neurovascularly intact distally.    Images: . Images Reviewed and discussed with patient.    Assessment:  1. Primary osteoarthritis of left knee        Plan:  At this time we discussed his physical exam and previous imaging findings.  Patient will continue low-impact activities, knee exercises, patient understands to call or return sooner if there is any new problems or difficulties.    Follow UP: No follow-ups on file.

## 2024-03-22 ENCOUNTER — LAB VISIT (OUTPATIENT)
Dept: LAB | Facility: HOSPITAL | Age: 79
End: 2024-03-22
Attending: INTERNAL MEDICINE
Payer: MEDICARE

## 2024-03-22 DIAGNOSIS — I25.10 CORONARY ATHEROSCLEROSIS OF NATIVE CORONARY ARTERY: Primary | ICD-10-CM

## 2024-03-22 DIAGNOSIS — E78.5 HYPERLIPIDEMIA, UNSPECIFIED HYPERLIPIDEMIA TYPE: ICD-10-CM

## 2024-03-22 DIAGNOSIS — I48.0 PAROXYSMAL ATRIAL FIBRILLATION: ICD-10-CM

## 2024-03-22 LAB
ALBUMIN SERPL-MCNC: 3.6 G/DL (ref 3.4–4.8)
ALBUMIN/GLOB SERPL: 1.2 RATIO (ref 1.1–2)
ALP SERPL-CCNC: 61 UNIT/L (ref 40–150)
ALT SERPL-CCNC: 24 UNIT/L (ref 0–55)
AST SERPL-CCNC: 19 UNIT/L (ref 5–34)
BILIRUB SERPL-MCNC: 1.6 MG/DL
BUN SERPL-MCNC: 14.1 MG/DL (ref 8.4–25.7)
CALCIUM SERPL-MCNC: 9 MG/DL (ref 8.8–10)
CHLORIDE SERPL-SCNC: 109 MMOL/L (ref 98–107)
CO2 SERPL-SCNC: 25 MMOL/L (ref 23–31)
CREAT SERPL-MCNC: 0.78 MG/DL (ref 0.73–1.18)
GFR SERPLBLD CREATININE-BSD FMLA CKD-EPI: >60 MLS/MIN/1.73/M2
GLOBULIN SER-MCNC: 3.1 GM/DL (ref 2.4–3.5)
GLUCOSE SERPL-MCNC: 116 MG/DL (ref 82–115)
POTASSIUM SERPL-SCNC: 4.5 MMOL/L (ref 3.5–5.1)
PROT SERPL-MCNC: 6.7 GM/DL (ref 5.8–7.6)
SODIUM SERPL-SCNC: 141 MMOL/L (ref 136–145)
T4 FREE SERPL-MCNC: 0.94 NG/DL (ref 0.7–1.48)
TSH SERPL-ACNC: 8.65 UIU/ML (ref 0.35–4.94)

## 2024-03-22 PROCEDURE — 80053 COMPREHEN METABOLIC PANEL: CPT

## 2024-03-22 PROCEDURE — 36415 COLL VENOUS BLD VENIPUNCTURE: CPT

## 2024-03-22 PROCEDURE — 84439 ASSAY OF FREE THYROXINE: CPT

## 2024-03-22 PROCEDURE — 84443 ASSAY THYROID STIM HORMONE: CPT

## 2024-04-09 ENCOUNTER — LAB VISIT (OUTPATIENT)
Dept: LAB | Facility: HOSPITAL | Age: 79
End: 2024-04-09
Attending: FAMILY MEDICINE
Payer: MEDICARE

## 2024-04-09 DIAGNOSIS — J44.9 VANISHING LUNG: ICD-10-CM

## 2024-04-09 DIAGNOSIS — I65.29 CAROTID ARTERY STENOSIS: ICD-10-CM

## 2024-04-09 DIAGNOSIS — Z00.00 ROUTINE GENERAL MEDICAL EXAMINATION AT A HEALTH CARE FACILITY: Primary | ICD-10-CM

## 2024-04-09 DIAGNOSIS — I42.8 OBSCURE CARDIOMYOPATHY OF AFRICA: ICD-10-CM

## 2024-04-09 DIAGNOSIS — I51.9 MYXEDEMA HEART DISEASE: ICD-10-CM

## 2024-04-09 DIAGNOSIS — I48.0 PAROXYSMAL ATRIAL FIBRILLATION: ICD-10-CM

## 2024-04-09 DIAGNOSIS — I25.10 CORONARY ATHEROSCLEROSIS OF NATIVE CORONARY ARTERY: ICD-10-CM

## 2024-04-09 DIAGNOSIS — E78.5 HYPERLIPIDEMIA, UNSPECIFIED HYPERLIPIDEMIA TYPE: ICD-10-CM

## 2024-04-09 DIAGNOSIS — C67.9 MALIGNANT NEOPLASM OF URINARY BLADDER, UNSPECIFIED SITE: ICD-10-CM

## 2024-04-09 DIAGNOSIS — I50.9 HEART FAILURE, UNSPECIFIED HF CHRONICITY, UNSPECIFIED HEART FAILURE TYPE: ICD-10-CM

## 2024-04-09 DIAGNOSIS — Z12.5 SPECIAL SCREENING FOR MALIGNANT NEOPLASM OF PROSTATE: ICD-10-CM

## 2024-04-09 DIAGNOSIS — E03.9 MYXEDEMA HEART DISEASE: ICD-10-CM

## 2024-04-09 LAB
ALBUMIN SERPL-MCNC: 3.7 G/DL (ref 3.4–4.8)
ALP SERPL-CCNC: 69 UNIT/L (ref 40–150)
ALT SERPL-CCNC: 20 UNIT/L (ref 0–55)
ANION GAP SERPL CALC-SCNC: 6 MEQ/L
AST SERPL-CCNC: 18 UNIT/L (ref 5–34)
BILIRUB SERPL-MCNC: 1.4 MG/DL
BILIRUBIN DIRECT+TOT PNL SERPL-MCNC: 0.4 MG/DL (ref 0–?)
BILIRUBIN DIRECT+TOT PNL SERPL-MCNC: 1 MG/DL (ref 0–0.8)
BUN SERPL-MCNC: 11.9 MG/DL (ref 8.4–25.7)
CALCIUM SERPL-MCNC: 9 MG/DL (ref 8.8–10)
CHLORIDE SERPL-SCNC: 107 MMOL/L (ref 98–107)
CHOLEST SERPL-MCNC: 129 MG/DL
CHOLEST/HDLC SERPL: 4 {RATIO} (ref 0–5)
CO2 SERPL-SCNC: 29 MMOL/L (ref 23–31)
CREAT SERPL-MCNC: 0.79 MG/DL (ref 0.73–1.18)
CREAT/UREA NIT SERPL: 15
ERYTHROCYTE [DISTWIDTH] IN BLOOD BY AUTOMATED COUNT: 14.4 % (ref 11.5–17)
FT4I SERPL CALC-MCNC: 2.53 (ref 2.6–3.6)
GFR SERPLBLD CREATININE-BSD FMLA CKD-EPI: >60 MLS/MIN/1.73/M2
GLUCOSE SERPL-MCNC: 112 MG/DL (ref 82–115)
HCT VFR BLD AUTO: 46.6 % (ref 42–52)
HDLC SERPL-MCNC: 30 MG/DL (ref 35–60)
HGB BLD-MCNC: 14.9 G/DL (ref 14–18)
LDLC SERPL CALC-MCNC: 59 MG/DL (ref 50–140)
MCH RBC QN AUTO: 32.8 PG (ref 27–31)
MCHC RBC AUTO-ENTMCNC: 32 G/DL (ref 33–36)
MCV RBC AUTO: 102.6 FL (ref 80–94)
PLATELET # BLD AUTO: 181 X10(3)/MCL (ref 130–400)
PMV BLD AUTO: 9.8 FL (ref 7.4–10.4)
POTASSIUM SERPL-SCNC: 4.3 MMOL/L (ref 3.5–5.1)
PROT SERPL-MCNC: 6.8 GM/DL (ref 5.8–7.6)
PSA SERPL-MCNC: 2.16 NG/ML
RBC # BLD AUTO: 4.54 X10(6)/MCL (ref 4.7–6.1)
SODIUM SERPL-SCNC: 142 MMOL/L (ref 136–145)
T3RU NFR SERPL: 33.26 % (ref 31–39)
T4 SERPL-MCNC: 7.61 UG/DL (ref 4.87–11.72)
TRIGL SERPL-MCNC: 199 MG/DL (ref 34–140)
TSH SERPL-ACNC: 11.83 UIU/ML (ref 0.35–4.94)
VLDLC SERPL CALC-MCNC: 40 MG/DL
WBC # SPEC AUTO: 6.76 X10(3)/MCL (ref 4.5–11.5)

## 2024-04-09 PROCEDURE — 80076 HEPATIC FUNCTION PANEL: CPT

## 2024-04-09 PROCEDURE — 84436 ASSAY OF TOTAL THYROXINE: CPT

## 2024-04-09 PROCEDURE — 36415 COLL VENOUS BLD VENIPUNCTURE: CPT

## 2024-04-09 PROCEDURE — 84153 ASSAY OF PSA TOTAL: CPT

## 2024-04-09 PROCEDURE — 80048 BASIC METABOLIC PNL TOTAL CA: CPT

## 2024-04-09 PROCEDURE — 80061 LIPID PANEL: CPT

## 2024-04-09 PROCEDURE — 85027 COMPLETE CBC AUTOMATED: CPT

## 2024-04-09 PROCEDURE — 84443 ASSAY THYROID STIM HORMONE: CPT

## 2024-05-25 ENCOUNTER — HOSPITAL ENCOUNTER (EMERGENCY)
Facility: HOSPITAL | Age: 79
Discharge: HOME | End: 2024-05-25
Attending: EMERGENCY MEDICINE
Payer: MEDICARE

## 2024-05-25 VITALS
OXYGEN SATURATION: 90 % | HEIGHT: 70 IN | DIASTOLIC BLOOD PRESSURE: 65 MMHG | BODY MASS INDEX: 29.78 KG/M2 | TEMPERATURE: 100 F | WEIGHT: 208 LBS | SYSTOLIC BLOOD PRESSURE: 110 MMHG | RESPIRATION RATE: 18 BRPM | HEART RATE: 88 BPM

## 2024-05-25 DIAGNOSIS — B96.89 ACUTE BACTERIAL BRONCHITIS: Primary | ICD-10-CM

## 2024-05-25 DIAGNOSIS — J20.8 ACUTE BACTERIAL BRONCHITIS: Primary | ICD-10-CM

## 2024-05-25 DIAGNOSIS — R05.9 COUGH: ICD-10-CM

## 2024-05-25 LAB
FLUAV AG UPPER RESP QL IA.RAPID: NOT DETECTED
FLUBV AG UPPER RESP QL IA.RAPID: NOT DETECTED
RSV A 5' UTR RNA NPH QL NAA+PROBE: NOT DETECTED
SARS-COV-2 RNA RESP QL NAA+PROBE: NOT DETECTED

## 2024-05-25 PROCEDURE — 0241U COVID/RSV/FLU A&B PCR: CPT | Performed by: EMERGENCY MEDICINE

## 2024-05-25 PROCEDURE — 99283 EMERGENCY DEPT VISIT LOW MDM: CPT | Mod: 25

## 2024-05-25 RX ORDER — AZITHROMYCIN 250 MG/1
250 TABLET, FILM COATED ORAL DAILY
Qty: 6 TABLET | Refills: 0 | Status: SHIPPED | OUTPATIENT
Start: 2024-05-25

## 2024-05-25 NOTE — ED PROVIDER NOTES
Encounter Date: 5/25/2024       History     Chief Complaint   Patient presents with    Fever     Pt presents to ER with c/o fever and cough since Thursday; hx of COPD; denies any pain      This 78-year-old man presents with complaints of a cough that started 2 days ago and fever today.  He states the cough is productive of green sputum.  Has a history of asthma and COPD.  He denies shortness of breath.       Review of patient's allergies indicates:   Allergen Reactions    Aspirin      GI distress with med, hx gastric ulcer     Past Medical History:   Diagnosis Date    Arthritis     Asthma     Atrial fibrillation     Bladder cancer     Bladder disorder     CAD (coronary artery disease)     CHF (congestive heart failure)     COPD (chronic obstructive pulmonary disease)     Diverticulitis     GERD (gastroesophageal reflux disease)     Heart damage     Insomnia     Skin cancer     Thyroid disease      Past Surgical History:   Procedure Laterality Date    BLADDER SURGERY      CARDIAC DEFIBRILLATOR PLACEMENT      CHOLECYSTECTOMY      EXCISIONAL HEMORRHOIDECTOMY      REPLACEMENT OF IMPLANTABLE CARDIOVERTER-DEFIBRILLATOR (ICD) BATTERY  07/2022    skin cancer removal Right     skin underneath the eye    SPINAL FUSION       Family History   Problem Relation Name Age of Onset    No Known Problems Mother      Cancer Father       Social History     Tobacco Use    Smoking status: Former     Current packs/day: 1.00     Average packs/day: 1 pack/day for 20.0 years (20.0 ttl pk-yrs)     Types: Cigarettes    Smokeless tobacco: Never    Tobacco comments:     Quit 30 years ago   Substance Use Topics    Alcohol use: Yes     Alcohol/week: 2.0 standard drinks of alcohol     Types: 2 Cans of beer per week    Drug use: Never     Review of Systems   Constitutional:  Positive for fever.   HENT:  Negative for sore throat.    Respiratory:  Positive for cough. Negative for shortness of breath.    Cardiovascular:  Negative for chest pain.    Gastrointestinal:  Negative for nausea.   Genitourinary:  Negative for dysuria.   Musculoskeletal:  Negative for back pain.   Skin:  Negative for rash.   Neurological:  Negative for weakness.   Hematological:  Does not bruise/bleed easily.       Physical Exam     Initial Vitals [05/25/24 1150]   BP Pulse Resp Temp SpO2   110/65 88 18 99.8 °F (37.7 °C) (!) 90 %      MAP       --         Physical Exam    Nursing note and vitals reviewed.  Constitutional: He appears well-developed and well-nourished.   HENT:   Head: Normocephalic and atraumatic.   Mouth/Throat: Mucous membranes are normal.   Eyes: EOM are normal. Pupils are equal, round, and reactive to light.   Neck: Neck supple.   Normal range of motion.  Cardiovascular:  Normal rate, regular rhythm, normal heart sounds and intact distal pulses.           Pulmonary/Chest: Breath sounds normal.   Abdominal: Abdomen is soft. Bowel sounds are normal.   Musculoskeletal:         General: Normal range of motion.      Cervical back: Normal range of motion and neck supple.     Neurological: He is alert and oriented to person, place, and time. He has normal strength.   Skin: Skin is warm and dry. Capillary refill takes less than 2 seconds.   Psychiatric: He has a normal mood and affect. His behavior is normal. Judgment and thought content normal.         ED Course   Procedures  Labs Reviewed   COVID/RSV/FLU A&B PCR - Normal    Narrative:     The Xpert Xpress SARS-CoV-2/FLU/RSV plus is a rapid, multiplexed real-time PCR test intended for the simultaneous qualitative detection and differentiation of SARS-CoV-2, Influenza A, Influenza B, and respiratory syncytial virus (RSV) viral RNA in either nasopharyngeal swab or nasal swab specimens.                Imaging Results              X-Ray Chest PA And Lateral (Final result)  Result time 05/25/24 12:28:45      Final result by Alex Alcaraz MD (05/25/24 12:28:45)                   Impression:      Chronic interstitial  markings and basilar atelectasis in both lungs.  No consolidation or evidence of acute cardiac decompensation.      Electronically signed by: Alex Lissa  Date:    05/25/2024  Time:    12:28               Narrative:    EXAMINATION:  XR CHEST PA AND LATERAL    CLINICAL HISTORY:  Cough, unspecified    TECHNIQUE:  PA and lateral views of the chest were performed.    COMPARISON:  Radiograph 12/09/2021; CT 12/09/2022    FINDINGS:  Left chest wall AICD.  The cardiomediastinal silhouette and pulmonary vasculature within normal limits.  Similar coarse interstitial markings throughout both lungs likely reflecting chronic changes.  Streaky bibasilar opacities compatible with atelectasis.  No consolidation, pneumothorax or pleural effusion identified.                                       Medications - No data to display  Medical Decision Making  Amount and/or Complexity of Data Reviewed  Radiology: ordered.                                      Clinical Impression:  Final diagnoses:  [R05.9] Cough  [J20.8, B96.89] Acute bacterial bronchitis (Primary)          ED Disposition Condition    Discharge Stable          ED Prescriptions       Medication Sig Dispense Start Date End Date Auth. Provider    azithromycin (Z-ADIS) 250 MG tablet Take 1 tablet (250 mg total) by mouth once daily. Take first 2 tablets together, then 1 every day until finished. 6 tablet 5/25/2024 -- Kris Ware MD          Follow-up Information       Follow up With Specialties Details Why Contact Info    Guillermo Brown Jr., MD Family Medicine Schedule an appointment as soon as possible for a visit   [address]  [address]  [facility] [address]  [phone]               Kris Ware MD  05/25/24 3388

## 2024-10-22 ENCOUNTER — PATIENT MESSAGE (OUTPATIENT)
Dept: RESEARCH | Facility: HOSPITAL | Age: 79
End: 2024-10-22
Payer: MEDICARE

## 2024-10-23 ENCOUNTER — PATIENT MESSAGE (OUTPATIENT)
Dept: RESEARCH | Facility: HOSPITAL | Age: 79
End: 2024-10-23
Payer: MEDICARE

## 2024-11-04 ENCOUNTER — PATIENT MESSAGE (OUTPATIENT)
Dept: RESEARCH | Facility: HOSPITAL | Age: 79
End: 2024-11-04
Payer: MEDICARE

## 2024-11-08 DIAGNOSIS — J44.9 COPD (CHRONIC OBSTRUCTIVE PULMONARY DISEASE): Primary | ICD-10-CM

## 2024-11-15 ENCOUNTER — PROCEDURE VISIT (OUTPATIENT)
Dept: RESPIRATORY THERAPY | Facility: HOSPITAL | Age: 79
End: 2024-11-15
Attending: INTERNAL MEDICINE
Payer: MEDICARE

## 2024-11-15 DIAGNOSIS — J44.9 COPD (CHRONIC OBSTRUCTIVE PULMONARY DISEASE): ICD-10-CM

## 2024-11-15 LAB
DLCO SINGLE BREATH LLN: 18.88
DLCO SINGLE BREATH PRE REF: 56.1 %
DLCO SINGLE BREATH REF: 25.8
DLCOC SBVA LLN: 2.48
DLCOC SBVA REF: 3.62
DLCOC SINGLE BREATH LLN: 18.88
DLCOC SINGLE BREATH REF: 25.8
DLCOCSBVAULN: 4.76
DLCOCSINGLEBREATHULN: 32.73
DLCOSINGLEBREATHULN: 32.73
DLCOSINGLEBREATHZSCORE: -2.69
DLCOVA LLN: 2.48
DLCOVA PRE REF: 75.7 %
DLCOVA PRE: 2.74 ML/(MIN*MMHG*L) (ref 2.48–4.76)
DLCOVA REF: 3.62
DLCOVAULN: 4.76
ERVN2 LLN: -16449.03
ERVN2 PRE REF: 134.3 %
ERVN2 PRE: 1.3 L (ref -16449.03–16450.97)
ERVN2 REF: 0.97
ERVN2ULN: ABNORMAL
FEF 25 75 LLN: 1.13
FEF 25 75 PRE REF: 9.3 %
FEF 25 75 REF: 2.84
FET100 CHG: 13.8 %
FEV05 LLN: 1.39
FEV05 REF: 2.52
FEV1 CHG: 4.6 %
FEV1 FVC LLN: 61
FEV1 FVC PRE REF: 47.6 %
FEV1 FVC REF: 75
FEV1 LLN: 2.08
FEV1 PRE REF: 43.2 %
FEV1 REF: 2.96
FEV1 VOL CHG: 0.06
FEV1FVCZSCORE: -3.97
FEV1ZSCORE: -2.98
FRCN2 LLN: 2.78
FRCN2 PRE REF: 141.7 %
FRCN2 REF: 3.76
FRCN2ULN: 4.75
FVC CHG: 10.2 %
FVC LLN: 2.9
FVC PRE REF: 90 %
FVC REF: 3.98
FVC VOL CHG: 0.36
FVCZSCORE: -0.6
ICN2REF: 3.07
IVC PRE: 3.51 L (ref 2.9–5.09)
IVC SINGLE BREATH LLN: 2.9
IVC SINGLE BREATH PRE REF: 88.2 %
IVC SINGLE BREATH REF: 3.98
IVCSINGLEBREATHULN: 5.09
LLN IC N2: -9999996.93
PEF LLN: 5.28
PEF PRE REF: 48.7 %
PEF REF: 7.6
POST FEF 25 75: 0.26 L/S (ref 1.13–4.55)
POST FET 100: 18.65 SEC
POST FEV1 FVC: 33.92 % (ref 60.59–87.93)
POST FEV1: 1.34 L (ref 2.08–3.79)
POST FEV5: 0.98 L (ref 1.39–3.66)
POST FVC: 3.95 L (ref 2.9–5.09)
POST PEF: 3.07 L/S (ref 5.28–9.92)
PRE DLCO: 14.49 ML/(MIN*MMHG) (ref 18.88–32.73)
PRE FEF 25 75: 0.26 L/S (ref 1.13–4.55)
PRE FET 100: 16.38 SEC
PRE FEV05 REF: 36.9 %
PRE FEV1 FVC: 35.72 % (ref 60.59–87.93)
PRE FEV1: 1.28 L (ref 2.08–3.79)
PRE FEV5: 0.93 L (ref 1.39–3.66)
PRE FRC N2: 5.33 L (ref 2.78–4.75)
PRE FVC: 3.58 L (ref 2.9–5.09)
PRE IC N2: 2.51 L (ref -9999996.93–#######.####)
PRE PEF: 3.7 L/S (ref 5.28–9.92)
PRE REF IC N2: 81.7 %
RVN2 LLN: 2.12
RVN2 PRE REF: 144.3 %
RVN2 PRE: 4.03 L (ref 2.12–3.47)
RVN2 REF: 2.79
RVN2TLCN2 LLN: 35.01
RVN2TLCN2 PRE REF: 116.8 %
RVN2TLCN2 PRE: 51.4 % (ref 35.01–52.97)
RVN2TLCN2 REF: 43.99
RVN2TLCN2ULN: 52.97
RVN2ULN: 3.47
TLCN2 LLN: 5.97
TLCN2 PRE REF: 110.1 %
TLCN2 PRE: 7.84 L (ref 5.97–8.28)
TLCN2 REF: 7.13
TLCN2ULN: 8.28
TLCN2ZSCORE: 1.02
ULN IC N2: ABNORMAL
VA PRE: 5.28 L (ref 6.98–6.98)
VA SINGLE BREATH LLN: 6.98
VA SINGLE BREATH PRE REF: 75.7 %
VA SINGLE BREATH REF: 6.98
VASINGLEBREATHULN: 6.98
VCMAXN2 LLN: 2.9
VCMAXN2 PRE REF: 95.7 %
VCMAXN2 PRE: 3.81 L (ref 2.9–5.09)
VCMAXN2 REF: 3.98
VCMAXN2ULN: 5.09

## 2024-11-15 PROCEDURE — 99900031 HC PATIENT EDUCATION (STAT)

## 2024-11-15 PROCEDURE — 94727 GAS DIL/WSHOT DETER LNG VOL: CPT

## 2024-11-15 PROCEDURE — 94729 DIFFUSING CAPACITY: CPT

## 2024-11-15 PROCEDURE — 94060 EVALUATION OF WHEEZING: CPT

## 2025-01-18 ENCOUNTER — HOSPITAL ENCOUNTER (EMERGENCY)
Facility: HOSPITAL | Age: 80
Discharge: HOME | End: 2025-01-18
Attending: FAMILY MEDICINE
Payer: MEDICARE

## 2025-01-18 VITALS
TEMPERATURE: 99 F | WEIGHT: 200 LBS | BODY MASS INDEX: 28.63 KG/M2 | HEIGHT: 70 IN | SYSTOLIC BLOOD PRESSURE: 128 MMHG | OXYGEN SATURATION: 92 % | HEART RATE: 82 BPM | DIASTOLIC BLOOD PRESSURE: 70 MMHG | RESPIRATION RATE: 18 BRPM

## 2025-01-18 DIAGNOSIS — M54.30 SCIATICA, UNSPECIFIED LATERALITY: Primary | ICD-10-CM

## 2025-01-18 PROCEDURE — 63600175 PHARM REV CODE 636 W HCPCS: Mod: JZ,TB | Performed by: FAMILY MEDICINE

## 2025-01-18 PROCEDURE — 99284 EMERGENCY DEPT VISIT MOD MDM: CPT | Mod: 25

## 2025-01-18 PROCEDURE — 96372 THER/PROPH/DIAG INJ SC/IM: CPT | Performed by: FAMILY MEDICINE

## 2025-01-18 RX ORDER — DICLOFENAC SODIUM 50 MG/1
50 TABLET, DELAYED RELEASE ORAL 3 TIMES DAILY
Qty: 15 TABLET | Refills: 0 | Status: SHIPPED | OUTPATIENT
Start: 2025-01-18 | End: 2025-01-23

## 2025-01-18 RX ORDER — KETOROLAC TROMETHAMINE 30 MG/ML
60 INJECTION, SOLUTION INTRAMUSCULAR; INTRAVENOUS
Status: COMPLETED | OUTPATIENT
Start: 2025-01-18 | End: 2025-01-18

## 2025-01-18 RX ORDER — CYCLOBENZAPRINE HCL 10 MG
10 TABLET ORAL 3 TIMES DAILY PRN
Qty: 15 TABLET | Refills: 0 | Status: SHIPPED | OUTPATIENT
Start: 2025-01-18 | End: 2025-01-23

## 2025-01-18 RX ADMIN — KETOROLAC TROMETHAMINE 60 MG: 30 INJECTION, SOLUTION INTRAMUSCULAR at 02:01

## 2025-01-18 NOTE — ED PROVIDER NOTES
Encounter Date: 1/18/2025       History     Chief Complaint   Patient presents with    Hip Pain     Pt c/o non traumatic right hip pain x 2 weeks, pain is constant, rates as a 10/10, has hx of sciatica, denies any falls.      Sciatica   79-year-old male patient with sciatic pain running down the right leg history of sciatic pain in the left had surgery and patient has been pain-free patient is history source        Review of patient's allergies indicates:   Allergen Reactions    Aspirin      GI distress with med, hx gastric ulcer     Past Medical History:   Diagnosis Date    Arthritis     Asthma     Atrial fibrillation     xarelto    Bladder cancer     Bladder disorder     CAD (coronary artery disease)     CHF (congestive heart failure)     COPD (chronic obstructive pulmonary disease)     Diverticulitis     GERD (gastroesophageal reflux disease)     Heart damage     Insomnia     Skin cancer     Thyroid disease      Past Surgical History:   Procedure Laterality Date    BLADDER SURGERY      CARDIAC DEFIBRILLATOR PLACEMENT      CHOLECYSTECTOMY      EXCISION OF PTERYGIUM Left 06/25/2024    Procedure: EXCISION, PTERYGIUM WITH MMC AND CONJ AUTOGRAFT - OS;  Surgeon: Rohith Crespo MD;  Location: [facility] OR;  Service: Ophthalmology;  Laterality: Left;    EXCISIONAL HEMORRHOIDECTOMY      INSERTION OF PACEMAKER      2021    PHACOEMULSIFICATION, CATARACT, WITH IOL INSERTION Right 08/27/2024    Procedure: PHACOEMULSIFICATION, CATARACT, WITH IOL INSERTION- OD;  Surgeon: Rohith Crespo MD;  Location: [facility] OR;  Service: Ophthalmology;  Laterality: Right;    PHACOEMULSIFICATION, CATARACT, WITH IOL INSERTION Left 9/3/2024    Procedure: PHACOEMULSIFICATION, CATARACT, WITH IOL INSERTION- OS;  Surgeon: Rohith Crespo MD;  Location: [facility] OR;  Service: Ophthalmology;  Laterality: Left;    REPLACEMENT OF IMPLANTABLE CARDIOVERTER-DEFIBRILLATOR (ICD) BATTERY  07/2022    skin cancer removal Right     skin underneath the eye    SPINAL FUSION        Family History   Problem Relation Name Age of Onset    No Known Problems Mother      Cancer Father       Social History     Tobacco Use    Smoking status: Former     Current packs/day: 1.00     Average packs/day: 1 pack/day for 20.0 years (20.0 ttl pk-yrs)     Types: Cigarettes    Smokeless tobacco: Never    Tobacco comments:     Quit 30 years ago   Substance Use Topics    Alcohol use: Yes     Alcohol/week: 2.0 standard drinks of alcohol     Types: 2 Cans of beer per week     Comment: 1 beer twice month    Drug use: Never     Review of Systems    Physical Exam     Initial Vitals [01/18/25 1353]   BP Pulse Resp Temp SpO2   128/70 82 18 98.9 °F (37.2 °C) (!) 92 %      MAP       --         Physical Exam    ED Course   Procedures  Labs Reviewed - No data to display       Imaging Results    None          Medications   ketorolac injection 60 mg (has no administration in time range)     Medical Decision Making                                    Clinical Impression:  Final diagnoses:  [M54.30] Sciatica, unspecified laterality (Primary)          ED Disposition Condition    Discharge Stable          ED Prescriptions       Medication Sig Dispense Start Date End Date Auth. Provider    diclofenac (VOLTAREN) 50 MG EC tablet Take 1 tablet (50 mg total) by mouth 3 (three) times daily. for 5 days 15 tablet 1/18/2025 1/23/2025 Everardo Monsalve MD    cyclobenzaprine (FLEXERIL) 10 MG tablet Take 1 tablet (10 mg total) by mouth 3 (three) times daily as needed for Muscle spasms. 15 tablet 1/18/2025 1/23/2025 Everardo Monsalve MD          Follow-up Information       Follow up With Specialties Details Why Contact Info    Guillermo Brwon Jr., MD Family Medicine   [address] [place] [address] A  [facility] [address]  [phone]               Everardo Monsalve MD  01/18/25 9437

## 2025-02-27 ENCOUNTER — OFFICE VISIT (OUTPATIENT)
Dept: ORTHOPEDICS | Facility: CLINIC | Age: 80
End: 2025-02-27
Payer: MEDICARE

## 2025-02-27 ENCOUNTER — HOSPITAL ENCOUNTER (OUTPATIENT)
Dept: RADIOLOGY | Facility: CLINIC | Age: 80
Discharge: HOME | End: 2025-02-27
Attending: ORTHOPAEDIC SURGERY
Payer: MEDICARE

## 2025-02-27 VITALS
BODY MASS INDEX: 30.09 KG/M2 | WEIGHT: 210.19 LBS | HEIGHT: 70 IN | HEART RATE: 80 BPM | DIASTOLIC BLOOD PRESSURE: 76 MMHG | SYSTOLIC BLOOD PRESSURE: 126 MMHG

## 2025-02-27 DIAGNOSIS — M25.551 RIGHT HIP PAIN: Primary | ICD-10-CM

## 2025-02-27 DIAGNOSIS — M25.551 RIGHT HIP PAIN: ICD-10-CM

## 2025-02-27 DIAGNOSIS — M54.16 LUMBAR RADICULOPATHY, CHRONIC: ICD-10-CM

## 2025-02-27 PROCEDURE — 73502 X-RAY EXAM HIP UNI 2-3 VIEWS: CPT | Mod: RT,,, | Performed by: ORTHOPAEDIC SURGERY

## 2025-02-27 RX ORDER — TADALAFIL 5 MG/1
5 TABLET ORAL DAILY PRN
COMMUNITY
Start: 2025-02-10

## 2025-02-27 RX ORDER — MV-MIN/FOLIC/K1/LYCOPEN/LUTEIN 300-60 MCG
TABLET ORAL DAILY
COMMUNITY

## 2025-02-27 NOTE — PROGRESS NOTES
Chief Complaint:   Chief Complaint   Patient presents with    Hip Pain     Rt hip pain - Pt states hip painn was bad enough he couldn't walk. Hx of steroids inj from PCP, Dr. Brown thinks it's from herniated disc.  is requesting notes from today's visit. Last inj around last December helped relieve the pain, still helping right now. Pain located in the back of the hip, some back pain. Pt reports a 5 on pain scale most days. MRI in chart.       History of present illness:    History of Present Illness  The patient presents for evaluation of right-sided sciatica.    He reports experiencing severe pain in his right buttock, which radiates down to his feet. He sought medical attention from his primary care physician, who administered a steroid injection, resulting in significant relief. However, he continues to experience mild discomfort in the left side of his sacroiliac joint. An MRI of his back was performed, but the results have not been provided to him. His medical history includes a fusion surgery on the left side of his sacroiliac joint, performed by Dr. Ruiz at [facility] in [place].    Supplemental Information  He has also undergone neck surgery.    Past Medical History:   Diagnosis Date    Arthritis     Asthma     Atrial fibrillation     xarelto    Bladder cancer     Bladder disorder     CAD (coronary artery disease)     CHF (congestive heart failure)     COPD (chronic obstructive pulmonary disease)     Diverticulitis     GERD (gastroesophageal reflux disease)     Heart damage     Insomnia     Skin cancer     Thyroid disease        Past Surgical History:   Procedure Laterality Date    BLADDER SURGERY      CARDIAC DEFIBRILLATOR PLACEMENT      CHOLECYSTECTOMY      EXCISION OF PTERYGIUM Left 06/25/2024    Procedure: EXCISION, PTERYGIUM WITH MMC AND CONJ AUTOGRAFT - OS;  Surgeon: Rohith Crespo MD;  Location: [facility];  Service: Ophthalmology;  Laterality: Left;    EXCISIONAL  HEMORRHOIDECTOMY      INSERTION OF PACEMAKER      2021    PHACOEMULSIFICATION, CATARACT, WITH IOL INSERTION Right 08/27/2024    Procedure: PHACOEMULSIFICATION, CATARACT, WITH IOL INSERTION- OD;  Surgeon: Rohith Crespo MD;  Location: [facility] OR;  Service: Ophthalmology;  Laterality: Right;    PHACOEMULSIFICATION, CATARACT, WITH IOL INSERTION Left 9/3/2024    Procedure: PHACOEMULSIFICATION, CATARACT, WITH IOL INSERTION- OS;  Surgeon: Rohith Crespo MD;  Location: [facility] OR;  Service: Ophthalmology;  Laterality: Left;    REPLACEMENT OF IMPLANTABLE CARDIOVERTER-DEFIBRILLATOR (ICD) BATTERY  07/2022    skin cancer removal Right     skin underneath the eye    SPINAL FUSION         Current Outpatient Medications   Medication Sig    albuterol (VENTOLIN HFA) 90 mcg/actuation inhaler Inhale 2 puffs into the lungs every 6 (six) hours as needed for Wheezing. Rescue    alfuzosin (UROXATRAL) 10 mg Tb24 Take 10 mg by mouth once daily.    amiodarone (PACERONE) 200 MG Tab Take 1 tablet by mouth Daily.    atorvastatin (LIPITOR) 40 MG tablet Take 40 mg by mouth every evening.    ENTRESTO 24-26 mg per tablet Take 1 tablet by mouth 2 (two) times daily.    furosemide (LASIX) 20 MG tablet Take 20 mg by mouth every morning.    levalbuterol (XOPENEX HFA) 45 mcg/actuation inhaler See Instructions, INHALE 2 PUFFS BY MOUTH EVERY FOUR (4) HOURS AS NEEDED FOR WHEEZING, # 15 cap(s), 5 Refill(s), Pharmacy: [facility], 167, cm, Height/Length Dosing, 02/14/21 14:28:00 CST, 74, kg, Weight Dosing, 02/14/21 14:28:00 CST    loratadine (CLARITIN) 10 mg tablet Take 10 mg by mouth once daily.    mv-min-folic-K1-lycopen-lutein (CENTRUM SILVER MEN) 354--300 mcg Tab Take by mouth once daily.    nitroGLYCERIN (NITROSTAT) 0.4 MG SL tablet Place 0.4 mg under the tongue.    omeprazole (PRILOSEC OTC) 20 MG tablet Take 20 mg by mouth.    tadalafiL (CIALIS) 5 MG tablet Take 5 mg by mouth daily as needed.    TRELEGY ELLIPTA 100-62.5-25 mcg DsDv Inhale 1 puff  into the lungs once daily.    XARELTO 20 mg Tab Take 20 mg by mouth.    azithromycin (Z-ADIS) 250 MG tablet Take 1 tablet (250 mg total) by mouth once daily. Take first 2 tablets together, then 1 every day until finished. (Patient not taking: Reported on 2/27/2025)    digoxin (LANOXIN) 125 mcg tablet Take 125 mcg by mouth once daily. (Patient not taking: Reported on 2/27/2025)    levothyroxine (SYNTHROID) 50 MCG tablet Take 50 mcg by mouth once daily. (Patient not taking: Reported on 2/27/2025)     No current facility-administered medications for this visit.     Facility-Administered Medications Ordered in Other Visits   Medication    0.9%  NaCl infusion    sodium chloride 0.9% flush 10 mL       Review of patient's allergies indicates:   Allergen Reactions    Aspirin      GI distress with med, hx gastric ulcer       Family History   Problem Relation Name Age of Onset    No Known Problems Mother      Cancer Father         Social History[1]        Review of Systems:    Constitution: Negative for chills, fever, and sweats.  Negative for unexplained weight loss.    HENT:  Negative for headaches and blurry vision.    Cardiovascular:Negative for chest pain or irregular heart beat. Negative for hypertension.    Respiratory:  Negative for cough and shortness of breath.    Gastrointestinal: Negative for abdominal pain, heartburn, melena, nausea, and vomitting.    Genitourinary:  Negative bladder incontinence and dysuria.    Musculoskeletal:  See HPI    Neurological: Negative for numbness.    Psychiatric/Behavioral: Negative for depression.  The patient is not nervous/anxious.      Endocrine: Negative for polyuria    Hematologic/Lymphatic: Negative for bleeding problem.  Does not bruise/bleed easily.    Skin: Negative for poor would healing and rash      Physical Examination:    Vital Signs:    Vitals:    02/27/25 0954   BP: 126/76   Pulse: 80       Body mass index is 30.16 kg/m².    General: No acute distress, alert and  oriented, healthy appearing    HEENT: Head is atraumatic, mucous membranes are moist    Neck: Supples, no JVD    Cardiovascular: Palpable dorsalis pedis and posterior tibial pulses, regular rate and rhythm to those pulses    Lungs: Breathing non-labored    Skin: no rashes appreciated    Neurologic: Can flex and extend knees, ankles, and toes. Sensation is grossly intact    Right hip:  Range of motion of the right hip without significant pain or discomfort.  Brisk cap refill disappeared sensation intact distally.  Internal and external rotation of the hip without pain.  Positive straight leg raise recreation of the symptoms with the right side.    X-rays:  Three views right hip reviewed with the patient's right hip is well-maintained.  No significant arthritic change noted.     Assessment::  Lumbar radiculopathy    Plan:  Patient has symptoms consistent with lumbar radiculopathy and sciatica.  He has almost all the way better.  He has significantly tightness hamstrings.  He would like to get him into physical therapy to see if we can keep this away.  He will contact with the this fails to relieve his symptoms.  It had an MRI consistent with impingement as well as the synovial cyst impinging on 1 of his nerve roots.  May need referral for injections if it continues or persists.    This note was generated with the assistance of ambient listening technology. Verbal consent was obtained by the patient and accompanying visitor(s) for the recording of patient appointment to facilitate this note. I attest to having reviewed and edited the generated note for accuracy, though some syntax or spelling errors may persist. Please contact the author of this note for any clarification.      This note was created using [employer] voice recognition software that occasionally misinterpreted phrases or words.    Consult note is delivered via Epic messaging service.         [1]   Social History  Socioeconomic History    Marital status:     Tobacco Use    Smoking status: Former     Current packs/day: 1.00     Average packs/day: 1 pack/day for 20.0 years (20.0 ttl pk-yrs)     Types: Cigarettes    Smokeless tobacco: Never    Tobacco comments:     Quit 30 years ago   Substance and Sexual Activity    Alcohol use: Yes     Alcohol/week: 2.0 standard drinks of alcohol     Types: 2 Cans of beer per week     Comment: 1 beer a week    Drug use: Never    Sexual activity: Not Currently

## 2025-03-06 ENCOUNTER — HOSPITAL ENCOUNTER (EMERGENCY)
Facility: HOSPITAL | Age: 80
Discharge: HOME | End: 2025-03-06
Attending: STUDENT IN AN ORGANIZED HEALTH CARE EDUCATION/TRAINING PROGRAM
Payer: MEDICARE

## 2025-03-06 VITALS
OXYGEN SATURATION: 91 % | BODY MASS INDEX: 30.06 KG/M2 | RESPIRATION RATE: 18 BRPM | SYSTOLIC BLOOD PRESSURE: 130 MMHG | TEMPERATURE: 98 F | DIASTOLIC BLOOD PRESSURE: 68 MMHG | HEIGHT: 70 IN | HEART RATE: 75 BPM | WEIGHT: 210 LBS

## 2025-03-06 DIAGNOSIS — R07.9 CHEST PAIN: ICD-10-CM

## 2025-03-06 DIAGNOSIS — R07.89 CHEST WALL PAIN: ICD-10-CM

## 2025-03-06 LAB
ALBUMIN SERPL-MCNC: 3.5 G/DL (ref 3.4–4.8)
ALBUMIN/GLOB SERPL: 1 RATIO (ref 1.1–2)
ALP SERPL-CCNC: 69 UNIT/L (ref 40–150)
ALT SERPL-CCNC: 13 UNIT/L (ref 0–55)
ANION GAP SERPL CALC-SCNC: 8 MEQ/L
AST SERPL-CCNC: 13 UNIT/L (ref 5–34)
BASOPHILS # BLD AUTO: 0.03 X10(3)/MCL
BASOPHILS NFR BLD AUTO: 0.4 %
BILIRUB SERPL-MCNC: 1.4 MG/DL
BNP BLD-MCNC: 43.9 PG/ML
BUN SERPL-MCNC: 12 MG/DL (ref 8.4–25.7)
CALCIUM SERPL-MCNC: 8.6 MG/DL (ref 8.8–10)
CHLORIDE SERPL-SCNC: 106 MMOL/L (ref 98–107)
CO2 SERPL-SCNC: 26 MMOL/L (ref 23–31)
CREAT SERPL-MCNC: 0.7 MG/DL (ref 0.72–1.25)
CREAT/UREA NIT SERPL: 17
EOSINOPHIL # BLD AUTO: 0.12 X10(3)/MCL (ref 0–0.9)
EOSINOPHIL NFR BLD AUTO: 1.6 %
ERYTHROCYTE [DISTWIDTH] IN BLOOD BY AUTOMATED COUNT: 15.2 % (ref 11.5–17)
FLUAV AG UPPER RESP QL IA.RAPID: NOT DETECTED
FLUBV AG UPPER RESP QL IA.RAPID: NOT DETECTED
GFR SERPLBLD CREATININE-BSD FMLA CKD-EPI: >60 ML/MIN/1.73/M2
GLOBULIN SER-MCNC: 3.5 GM/DL (ref 2.4–3.5)
GLUCOSE SERPL-MCNC: 140 MG/DL (ref 82–115)
HCT VFR BLD AUTO: 43.1 % (ref 42–52)
HGB BLD-MCNC: 13.9 G/DL (ref 14–18)
IMM GRANULOCYTES # BLD AUTO: 0.02 X10(3)/MCL (ref 0–0.04)
IMM GRANULOCYTES NFR BLD AUTO: 0.3 %
LYMPHOCYTES # BLD AUTO: 1.31 X10(3)/MCL (ref 0.6–4.6)
LYMPHOCYTES NFR BLD AUTO: 17.8 %
MAGNESIUM SERPL-MCNC: 1.9 MG/DL (ref 1.6–2.6)
MCH RBC QN AUTO: 31.9 PG (ref 27–31)
MCHC RBC AUTO-ENTMCNC: 32.3 G/DL (ref 33–36)
MCV RBC AUTO: 98.9 FL (ref 80–94)
MONOCYTES # BLD AUTO: 0.59 X10(3)/MCL (ref 0.1–1.3)
MONOCYTES NFR BLD AUTO: 8 %
NEUTROPHILS # BLD AUTO: 5.28 X10(3)/MCL (ref 2.1–9.2)
NEUTROPHILS NFR BLD AUTO: 71.9 %
PLATELET # BLD AUTO: 162 X10(3)/MCL (ref 130–400)
PMV BLD AUTO: 8.7 FL (ref 7.4–10.4)
POC CARDIAC TROPONIN I: 0.03 NG/ML (ref 0–0.08)
POTASSIUM SERPL-SCNC: 3.9 MMOL/L (ref 3.5–5.1)
PROT SERPL-MCNC: 7 GM/DL (ref 5.8–7.6)
RBC # BLD AUTO: 4.36 X10(6)/MCL (ref 4.7–6.1)
SAMPLE: NORMAL
SARS-COV-2 RNA RESP QL NAA+PROBE: NOT DETECTED
SODIUM SERPL-SCNC: 140 MMOL/L (ref 136–145)
TROPONIN I SERPL-MCNC: 0.02 NG/ML (ref 0–0.04)
TROPONIN I SERPL-MCNC: 0.02 NG/ML (ref 0–0.04)
WBC # BLD AUTO: 7.35 X10(3)/MCL (ref 4.5–11.5)

## 2025-03-06 PROCEDURE — 93005 ELECTROCARDIOGRAM TRACING: CPT

## 2025-03-06 PROCEDURE — 93010 ELECTROCARDIOGRAM REPORT: CPT | Mod: ,,, | Performed by: INTERNAL MEDICINE

## 2025-03-06 PROCEDURE — 80053 COMPREHEN METABOLIC PANEL: CPT | Performed by: STUDENT IN AN ORGANIZED HEALTH CARE EDUCATION/TRAINING PROGRAM

## 2025-03-06 PROCEDURE — 83735 ASSAY OF MAGNESIUM: CPT | Performed by: STUDENT IN AN ORGANIZED HEALTH CARE EDUCATION/TRAINING PROGRAM

## 2025-03-06 PROCEDURE — 85025 COMPLETE CBC W/AUTO DIFF WBC: CPT | Performed by: STUDENT IN AN ORGANIZED HEALTH CARE EDUCATION/TRAINING PROGRAM

## 2025-03-06 PROCEDURE — 84484 ASSAY OF TROPONIN QUANT: CPT

## 2025-03-06 PROCEDURE — 25000003 PHARM REV CODE 250: Performed by: STUDENT IN AN ORGANIZED HEALTH CARE EDUCATION/TRAINING PROGRAM

## 2025-03-06 PROCEDURE — 84484 ASSAY OF TROPONIN QUANT: CPT | Performed by: STUDENT IN AN ORGANIZED HEALTH CARE EDUCATION/TRAINING PROGRAM

## 2025-03-06 PROCEDURE — 0240U COVID/FLU A&B PCR: CPT | Performed by: STUDENT IN AN ORGANIZED HEALTH CARE EDUCATION/TRAINING PROGRAM

## 2025-03-06 PROCEDURE — 83880 ASSAY OF NATRIURETIC PEPTIDE: CPT | Performed by: STUDENT IN AN ORGANIZED HEALTH CARE EDUCATION/TRAINING PROGRAM

## 2025-03-06 PROCEDURE — 99285 EMERGENCY DEPT VISIT HI MDM: CPT | Mod: 25

## 2025-03-06 RX ORDER — ACETAMINOPHEN 500 MG
1000 TABLET ORAL
Status: COMPLETED | OUTPATIENT
Start: 2025-03-06 | End: 2025-03-06

## 2025-03-06 RX ORDER — MORPHINE SULFATE 4 MG/ML
2 INJECTION, SOLUTION INTRAMUSCULAR; INTRAVENOUS
Refills: 0 | Status: DISCONTINUED | OUTPATIENT
Start: 2025-03-06 | End: 2025-03-06

## 2025-03-06 RX ADMIN — ACETAMINOPHEN 1000 MG: 500 TABLET, FILM COATED ORAL at 08:03

## 2025-03-06 NOTE — ED NOTES
Spoke with representative from [facility] -there system is down and unable to give a verbal or a report on the pacer interrogation at this time

## 2025-03-06 NOTE — ED PROVIDER NOTES
Encounter Date: 3/6/2025       History     Chief Complaint   Patient presents with    Chest Pain     Chest pain since last night with SOB and cough      HPI  Patient is a 79-year-old male past medical history of arthritis, asthma, atrial fibrillation (on Xarelto), CAD, CHF, COPD, GERD, skin cancer, presents to the ER complaining of chest pain upon since last night with associated shortness of breath.  Patient states pain is sharp.  He denies any recent travel, known sick contacts.  Denies any heavy lifting.  He denies any worsening lower extremity edema.  Secondary to symptoms presents to ER further care and evaluation.  Review of patient's allergies indicates:   Allergen Reactions    Aspirin      GI distress with med, hx gastric ulcer     Past Medical History:   Diagnosis Date    Arthritis     Asthma     Atrial fibrillation     xarelto    Bladder cancer     Bladder disorder     CAD (coronary artery disease)     CHF (congestive heart failure)     COPD (chronic obstructive pulmonary disease)     Diverticulitis     GERD (gastroesophageal reflux disease)     Heart damage     Insomnia     Skin cancer     Thyroid disease      Past Surgical History:   Procedure Laterality Date    BLADDER SURGERY      CARDIAC DEFIBRILLATOR PLACEMENT      CHOLECYSTECTOMY      EXCISION OF PTERYGIUM Left 06/25/2024    Procedure: EXCISION, PTERYGIUM WITH MMC AND CONJ AUTOGRAFT - OS;  Surgeon: Rohith Crespo MD;  Location: [facility] OR;  Service: Ophthalmology;  Laterality: Left;    EXCISIONAL HEMORRHOIDECTOMY      INSERTION OF PACEMAKER      2021    PHACOEMULSIFICATION, CATARACT, WITH IOL INSERTION Right 08/27/2024    Procedure: PHACOEMULSIFICATION, CATARACT, WITH IOL INSERTION- OD;  Surgeon: Rohith Crespo MD;  Location: [facility] OR;  Service: Ophthalmology;  Laterality: Right;    PHACOEMULSIFICATION, CATARACT, WITH IOL INSERTION Left 9/3/2024    Procedure: PHACOEMULSIFICATION, CATARACT, WITH IOL INSERTION- OS;  Surgeon: Rohith Crespo MD;   Location: [facility] OR;  Service: Ophthalmology;  Laterality: Left;    REPLACEMENT OF IMPLANTABLE CARDIOVERTER-DEFIBRILLATOR (ICD) BATTERY  07/2022    skin cancer removal Right     skin underneath the eye    SPINAL FUSION       Family History   Problem Relation Name Age of Onset    No Known Problems Mother      Cancer Father       Social History[1]  Review of Systems   Constitutional:  Negative for fever.   HENT:  Negative for sore throat.    Eyes:  Negative for visual disturbance.   Respiratory:  Negative for shortness of breath.    Cardiovascular:  Positive for chest pain.   Gastrointestinal:  Negative for nausea.   Endocrine: Negative for polyuria.   Genitourinary:  Negative for dysuria.   Musculoskeletal:  Negative for back pain.   Skin:  Negative for rash.   Neurological:  Negative for weakness.   Hematological:  Does not bruise/bleed easily.   All other systems reviewed and are negative.      Physical Exam     Initial Vitals [03/06/25 0822]   BP Pulse Resp Temp SpO2   137/83 89 20 97.9 °F (36.6 °C) 95 %      MAP       --         Physical Exam    Nursing note and vitals reviewed.  Constitutional: Vital signs are normal. He appears well-developed and well-nourished. He is not diaphoretic. He is active.  Non-toxic appearance. He does not appear ill. No distress.   HENT:   Head: Normocephalic and atraumatic.   Eyes: Conjunctivae are normal. Pupils are equal, round, and reactive to light. Right conjunctiva is not injected. Left conjunctiva is not injected.   Neck: Trachea normal. Neck supple.   Normal range of motion.   Full passive range of motion without pain.     Cardiovascular:  Normal rate, S1 normal, S2 normal, intact distal pulses and normal pulses.           Pulmonary/Chest: Breath sounds normal. No respiratory distress. He has no wheezes.   Abdominal: Abdomen is soft. Bowel sounds are normal. There is no abdominal tenderness.   Musculoskeletal:         General: No tenderness.      Cervical back: Full passive  range of motion without pain, normal range of motion and neck supple. No rigidity.      Right lower leg: No swelling. No edema.      Left lower leg: No swelling. No edema.     Neurological: He is alert and oriented to person, place, and time.   Skin: Skin is warm and dry. Capillary refill takes less than 2 seconds.         ED Course   Procedures  Labs Reviewed   COMPREHENSIVE METABOLIC PANEL - Abnormal       Result Value    Sodium 140      Potassium 3.9      Chloride 106      CO2 26      Glucose 140 (*)     Blood Urea Nitrogen 12.0      Creatinine 0.70 (*)     Calcium 8.6 (*)     Protein Total 7.0      Albumin 3.5      Globulin 3.5      Albumin/Globulin Ratio 1.0 (*)     Bilirubin Total 1.4      ALP 69      ALT 13      AST 13      eGFR >60      Anion Gap 8.0      BUN/Creatinine Ratio 17     CBC WITH DIFFERENTIAL - Abnormal    WBC 7.35      RBC 4.36 (*)     Hgb 13.9 (*)     Hct 43.1      MCV 98.9 (*)     MCH 31.9 (*)     MCHC 32.3 (*)     RDW 15.2      Platelet 162      MPV 8.7      Neut % 71.9      Lymph % 17.8      Mono % 8.0      Eos % 1.6      Basophil % 0.4      Imm Grans % 0.3      Neut # 5.28      Lymph # 1.31      Mono # 0.59      Eos # 0.12      Baso # 0.03      Imm Gran # 0.02     TROPONIN I - Normal    Troponin-I 0.018     MAGNESIUM - Normal    Magnesium Level 1.90     B-TYPE NATRIURETIC PEPTIDE - Normal    Natriuretic Peptide 43.9     COVID/FLU A&B PCR - Normal    Influenza A PCR Not Detected      Influenza B PCR Not Detected      SARS-CoV-2 PCR Not Detected      Narrative:     The Xpert Xpress SARS-CoV-2/FLU/RSV plus is a rapid, multiplexed real-time PCR test intended for the simultaneous qualitative detection and differentiation of SARS-CoV-2, Influenza A, Influenza B, and respiratory syncytial virus (RSV) viral RNA in either nasopharyngeal swab or nasal swab specimens.         CBC W/ AUTO DIFFERENTIAL    Narrative:     The following orders were created for panel order CBC auto differential.  Procedure                                Abnormality         Status                     ---------                               -----------         ------                     CBC with Differential[[id number]]       Abnormal            Final result                 Please view results for these tests on the individual orders.   TROPONIN I   POCT TROPONIN     EKG Readings: (Independently Interpreted)   Rhythm: Paced Rhythm. Heart Rate: 80.   Ventricular paced rhythm, no acute ST elevations noted.  Rate 80 beats per minute.       Imaging Results              X-Ray Chest AP Portable (Final result)  Result time 03/06/25 08:43:03      Final result by Raul Cordero MD (03/06/25 08:43:03)                   Impression:      No acute cardiopulmonary process identified.      Electronically signed by: Raul Cordero  Date:    03/06/2025  Time:    08:43               Narrative:    EXAMINATION:  XR CHEST AP PORTABLE    CLINICAL HISTORY:  Chest pain, unspecified    TECHNIQUE:  One view    COMPARISON:  May 25, 2024.    FINDINGS:  Cardiopericardial silhouette appearance similar.  Cardiac device electrodes are in similar position.  No acute dense focal or segmental consolidation, congestive process, pleural effusions or pneumothorax.                                       Medications   acetaminophen tablet 1,000 mg (1,000 mg Oral Given 3/6/25 0845)     Medical Decision Making  Patient is a 79-year-old male past medical history of arthritis, asthma, atrial fibrillation (on Xarelto), CAD, CHF, COPD, GERD, skin cancer, presents to the ER complaining of chest pain upon since last night with associated shortness of breath.      Differential diagnosis includes but not limited to:  ACS, arrhythmia, pneumonia, pneumothorax, PE    Patient vitals on arrival stable.  Patient no apparent distress on arrival.  EKG negative for STEMI.  Attempted to interrogate device however endorses staff reports system malfunction.  Screening laboratories obtained negative  for any ghassan anemia, no gross electrolyte derangements.  Troponin of 0.018.  BNP of 43.  Chest x-ray negative for any acute cardiopulmonary disease process.  Viral swabs were negative.  Attempted to obtain repeat troponin however equipment fell a here in the hospital unable to obtain troponin in lab, opted to obtain point of care which returned within normal limits.  Patient remained asymptomatic throughout duration stay here in the ER.  Will discharge at this time.  He has been given strict return precautions if symptoms worsen, change or he has any concerns to return to ER for further care and evaluation.  Encouraged to follow-up PCP and cardiologist in next few days.    Oneil Islas M.D.  Emergency Medicine        Amount and/or Complexity of Data Reviewed  Labs: ordered.  Radiology: ordered.    Risk  OTC drugs.  Prescription drug management.                                      Clinical Impression:  Final diagnoses:  [R07.89] Chest wall pain  [R07.9] Chest pain                     [1]   Social History  Tobacco Use    Smoking status: Former     Current packs/day: 1.00     Average packs/day: 1 pack/day for 20.0 years (20.0 ttl pk-yrs)     Types: Cigarettes    Smokeless tobacco: Never    Tobacco comments:     Quit 30 years ago   Substance Use Topics    Alcohol use: Yes     Alcohol/week: 2.0 standard drinks of alcohol     Types: 2 Cans of beer per week     Comment: 1 beer a week    Drug use: Never        Oneil Islas MD  03/06/25 1098

## 2025-03-10 LAB
OHS QRS DURATION: 198 MS
OHS QTC CALCULATION: 539 MS

## 2025-03-26 ENCOUNTER — LAB VISIT (OUTPATIENT)
Dept: LAB | Facility: HOSPITAL | Age: 80
End: 2025-03-26
Attending: INTERNAL MEDICINE
Payer: MEDICARE

## 2025-03-26 DIAGNOSIS — E78.5 HYPERLIPIDEMIA, UNSPECIFIED HYPERLIPIDEMIA TYPE: ICD-10-CM

## 2025-03-26 DIAGNOSIS — I48.0 PAF (PAROXYSMAL ATRIAL FIBRILLATION): ICD-10-CM

## 2025-03-26 DIAGNOSIS — I25.10 CAD IN NATIVE ARTERY: Primary | ICD-10-CM

## 2025-03-26 LAB
ANION GAP SERPL CALC-SCNC: 7 MEQ/L
BUN SERPL-MCNC: 11.1 MG/DL (ref 8.4–25.7)
CALCIUM SERPL-MCNC: 8.7 MG/DL (ref 8.8–10)
CHLORIDE SERPL-SCNC: 105 MMOL/L (ref 98–107)
CO2 SERPL-SCNC: 29 MMOL/L (ref 23–31)
CREAT SERPL-MCNC: 0.63 MG/DL (ref 0.72–1.25)
CREAT/UREA NIT SERPL: 18
ERYTHROCYTE [DISTWIDTH] IN BLOOD BY AUTOMATED COUNT: 15.3 % (ref 11.5–17)
GFR SERPLBLD CREATININE-BSD FMLA CKD-EPI: >60 ML/MIN/1.73/M2
GLUCOSE SERPL-MCNC: 84 MG/DL (ref 82–115)
HCT VFR BLD AUTO: 43.6 % (ref 42–52)
HGB BLD-MCNC: 14.4 G/DL (ref 14–18)
MAGNESIUM SERPL-MCNC: 2 MG/DL (ref 1.6–2.6)
MCH RBC QN AUTO: 32.4 PG (ref 27–31)
MCHC RBC AUTO-ENTMCNC: 33 G/DL (ref 33–36)
MCV RBC AUTO: 98 FL (ref 80–94)
NRBC BLD AUTO-RTO: 0 %
PLATELET # BLD AUTO: 196 X10(3)/MCL (ref 130–400)
PMV BLD AUTO: 9.2 FL (ref 7.4–10.4)
POTASSIUM SERPL-SCNC: 3.9 MMOL/L (ref 3.5–5.1)
RBC # BLD AUTO: 4.45 X10(6)/MCL (ref 4.7–6.1)
SODIUM SERPL-SCNC: 141 MMOL/L (ref 136–145)
WBC # BLD AUTO: 7.8 X10(3)/MCL (ref 4.5–11.5)

## 2025-03-26 PROCEDURE — 36415 COLL VENOUS BLD VENIPUNCTURE: CPT

## 2025-03-26 PROCEDURE — 85027 COMPLETE CBC AUTOMATED: CPT

## 2025-03-26 PROCEDURE — 80048 BASIC METABOLIC PNL TOTAL CA: CPT

## 2025-03-26 PROCEDURE — 83735 ASSAY OF MAGNESIUM: CPT

## 2025-04-11 ENCOUNTER — ANESTHESIA EVENT (OUTPATIENT)
Dept: CARDIOLOGY | Facility: HOSPITAL | Age: 80
DRG: 317 | End: 2025-04-11
Payer: MEDICARE

## 2025-04-11 RX ORDER — OLOPATADINE HYDROCHLORIDE 1 MG/ML
1-2 SOLUTION OPHTHALMIC DAILY
COMMUNITY

## 2025-04-14 ENCOUNTER — ANESTHESIA (OUTPATIENT)
Dept: CARDIOLOGY | Facility: HOSPITAL | Age: 80
DRG: 317 | End: 2025-04-14
Payer: MEDICARE

## 2025-04-14 ENCOUNTER — HOSPITAL ENCOUNTER (INPATIENT)
Facility: HOSPITAL | Age: 80
LOS: 1 days | Discharge: HOME | DRG: 317 | End: 2025-04-15
Attending: INTERNAL MEDICINE | Admitting: INTERNAL MEDICINE
Payer: MEDICARE

## 2025-04-14 DIAGNOSIS — R07.9 CHEST PAIN: ICD-10-CM

## 2025-04-14 DIAGNOSIS — I48.91 ATRIAL FIBRILLATION: ICD-10-CM

## 2025-04-14 DIAGNOSIS — I48.19 PERSISTENT ATRIAL FIBRILLATION: ICD-10-CM

## 2025-04-14 DIAGNOSIS — Z01.818 PREOP TESTING: ICD-10-CM

## 2025-04-14 DIAGNOSIS — Z00.6 EXAMINATION OF PARTICIPANT IN CLINICAL TRIAL: ICD-10-CM

## 2025-04-14 LAB
ABORH RETYPE: NORMAL
BSA FOR ECHO PROCEDURE: 2.16 M2
GROUP & RH: NORMAL
INDIRECT COOMBS: NORMAL
INR PPP: 1.1
PROTHROMBIN TIME: 14 SECONDS (ref 12.5–14.5)
SPECIMEN OUTDATE: NORMAL

## 2025-04-14 PROCEDURE — 93005 ELECTROCARDIOGRAM TRACING: CPT

## 2025-04-14 PROCEDURE — 02583ZZ DESTRUCTION OF CONDUCTION MECHANISM, PERCUTANEOUS APPROACH: ICD-10-PCS | Performed by: INTERNAL MEDICINE

## 2025-04-14 PROCEDURE — 36620 INSERTION CATHETER ARTERY: CPT | Mod: 59,,, | Performed by: ANESTHESIOLOGY

## 2025-04-14 PROCEDURE — C1730 CATH, EP, 19 OR FEW ELECT: HCPCS | Performed by: INTERNAL MEDICINE

## 2025-04-14 PROCEDURE — 4A023FZ MEASUREMENT OF CARDIAC RHYTHM, PERCUTANEOUS APPROACH: ICD-10-PCS | Performed by: INTERNAL MEDICINE

## 2025-04-14 PROCEDURE — D9220A PRA ANESTHESIA: Mod: Q0,ANES,, | Performed by: ANESTHESIOLOGY

## 2025-04-14 PROCEDURE — 85610 PROTHROMBIN TIME: CPT | Performed by: INTERNAL MEDICINE

## 2025-04-14 PROCEDURE — 93662 INTRACARDIAC ECG (ICE): CPT | Performed by: INTERNAL MEDICINE

## 2025-04-14 PROCEDURE — 27201423 OPTIME MED/SURG SUP & DEVICES STERILE SUPPLY: Performed by: INTERNAL MEDICINE

## 2025-04-14 PROCEDURE — 37000008 HC ANESTHESIA 1ST 15 MINUTES: Performed by: INTERNAL MEDICINE

## 2025-04-14 PROCEDURE — 86901 BLOOD TYPING SEROLOGIC RH(D): CPT | Performed by: INTERNAL MEDICINE

## 2025-04-14 PROCEDURE — 93653 COMPRE EP EVAL TX SVT: CPT | Performed by: INTERNAL MEDICINE

## 2025-04-14 PROCEDURE — 25000242 PHARM REV CODE 250 ALT 637 W/ HCPCS: Performed by: NURSE ANESTHETIST, CERTIFIED REGISTERED

## 2025-04-14 PROCEDURE — 25000003 PHARM REV CODE 250: Performed by: NURSE PRACTITIONER

## 2025-04-14 PROCEDURE — 63600175 PHARM REV CODE 636 W HCPCS: Performed by: NURSE ANESTHETIST, CERTIFIED REGISTERED

## 2025-04-14 PROCEDURE — C1894 INTRO/SHEATH, NON-LASER: HCPCS | Performed by: INTERNAL MEDICINE

## 2025-04-14 PROCEDURE — 93655 ICAR CATH ABLTJ DSCRT ARRHYT: CPT | Performed by: INTERNAL MEDICINE

## 2025-04-14 PROCEDURE — 11000001 HC ACUTE MED/SURG PRIVATE ROOM

## 2025-04-14 PROCEDURE — C1753 CATH, INTRAVAS ULTRASOUND: HCPCS | Performed by: INTERNAL MEDICINE

## 2025-04-14 PROCEDURE — 27000221 HC OXYGEN, UP TO 24 HOURS

## 2025-04-14 PROCEDURE — 36415 COLL VENOUS BLD VENIPUNCTURE: CPT | Performed by: INTERNAL MEDICINE

## 2025-04-14 PROCEDURE — 25000003 PHARM REV CODE 250: Performed by: INTERNAL MEDICINE

## 2025-04-14 PROCEDURE — 93010 ELECTROCARDIOGRAM REPORT: CPT | Mod: ,,, | Performed by: INTERNAL MEDICINE

## 2025-04-14 PROCEDURE — 93462 L HRT CATH TRNSPTL PUNCTURE: CPT | Mod: XU | Performed by: INTERNAL MEDICINE

## 2025-04-14 PROCEDURE — 51702 INSERT TEMP BLADDER CATH: CPT

## 2025-04-14 PROCEDURE — 25000003 PHARM REV CODE 250: Performed by: NURSE ANESTHETIST, CERTIFIED REGISTERED

## 2025-04-14 PROCEDURE — 63600175 PHARM REV CODE 636 W HCPCS: Performed by: ANESTHESIOLOGY

## 2025-04-14 PROCEDURE — 63600175 PHARM REV CODE 636 W HCPCS: Performed by: INTERNAL MEDICINE

## 2025-04-14 PROCEDURE — 37000009 HC ANESTHESIA EA ADD 15 MINS: Performed by: INTERNAL MEDICINE

## 2025-04-14 PROCEDURE — C2630 CATH, EP, COOL-TIP: HCPCS | Performed by: INTERNAL MEDICINE

## 2025-04-14 PROCEDURE — 02L73DK OCCLUSION OF LEFT ATRIAL APPENDAGE WITH INTRALUMINAL DEVICE, PERCUTANEOUS APPROACH: ICD-10-PCS | Performed by: INTERNAL MEDICINE

## 2025-04-14 PROCEDURE — C1889 IMPLANT/INSERT DEVICE, NOC: HCPCS | Performed by: INTERNAL MEDICINE

## 2025-04-14 PROCEDURE — 4A0234Z MEASUREMENT OF CARDIAC ELECTRICAL ACTIVITY, PERCUTANEOUS APPROACH: ICD-10-PCS | Performed by: INTERNAL MEDICINE

## 2025-04-14 PROCEDURE — 33340 PERQ CLSR TCAT L ATR APNDGE: CPT | Mod: Q0 | Performed by: INTERNAL MEDICINE

## 2025-04-14 PROCEDURE — C1769 GUIDE WIRE: HCPCS | Performed by: INTERNAL MEDICINE

## 2025-04-14 PROCEDURE — 93010 ELECTROCARDIOGRAM REPORT: CPT | Mod: ,,, | Performed by: STUDENT IN AN ORGANIZED HEALTH CARE EDUCATION/TRAINING PROGRAM

## 2025-04-14 PROCEDURE — D9220A PRA ANESTHESIA: Mod: Q0,CRNA,, | Performed by: NURSE ANESTHETIST, CERTIFIED REGISTERED

## 2025-04-14 DEVICE — IMPLANTABLE DEVICE: Type: IMPLANTABLE DEVICE | Site: HEART | Status: FUNCTIONAL

## 2025-04-14 RX ORDER — PHENYLEPHRINE HYDROCHLORIDE 10 MG/ML
INJECTION INTRAVENOUS
Status: DISCONTINUED | OUTPATIENT
Start: 2025-04-14 | End: 2025-04-14

## 2025-04-14 RX ORDER — ACETAMINOPHEN 325 MG/1
650 TABLET ORAL EVERY 4 HOURS PRN
Status: DISCONTINUED | OUTPATIENT
Start: 2025-04-14 | End: 2025-04-15 | Stop reason: HOSPADM

## 2025-04-14 RX ORDER — MIDAZOLAM HYDROCHLORIDE 1 MG/ML
INJECTION INTRAMUSCULAR; INTRAVENOUS
Status: DISCONTINUED | OUTPATIENT
Start: 2025-04-14 | End: 2025-04-14

## 2025-04-14 RX ORDER — CEFUROXIME SODIUM 1.5 G/16ML
1.5 INJECTION, POWDER, FOR SOLUTION INTRAVENOUS
Status: DISCONTINUED | OUTPATIENT
Start: 2025-04-14 | End: 2025-04-14 | Stop reason: HOSPADM

## 2025-04-14 RX ORDER — ALUMINUM HYDROXIDE, MAGNESIUM HYDROXIDE, AND SIMETHICONE 1200; 120; 1200 MG/30ML; MG/30ML; MG/30ML
30 SUSPENSION ORAL EVERY 4 HOURS PRN
Status: DISCONTINUED | OUTPATIENT
Start: 2025-04-14 | End: 2025-04-15 | Stop reason: HOSPADM

## 2025-04-14 RX ORDER — ONDANSETRON HYDROCHLORIDE 2 MG/ML
4 INJECTION, SOLUTION INTRAVENOUS DAILY PRN
Status: DISCONTINUED | OUTPATIENT
Start: 2025-04-14 | End: 2025-04-14 | Stop reason: HOSPADM

## 2025-04-14 RX ORDER — HYDROMORPHONE HYDROCHLORIDE 2 MG/ML
0.2 INJECTION, SOLUTION INTRAMUSCULAR; INTRAVENOUS; SUBCUTANEOUS EVERY 5 MIN PRN
Status: DISCONTINUED | OUTPATIENT
Start: 2025-04-14 | End: 2025-04-14 | Stop reason: HOSPADM

## 2025-04-14 RX ORDER — FUROSEMIDE 10 MG/ML
INJECTION INTRAMUSCULAR; INTRAVENOUS
Status: DISCONTINUED | OUTPATIENT
Start: 2025-04-14 | End: 2025-04-14

## 2025-04-14 RX ORDER — PROTAMINE SULFATE 10 MG/ML
INJECTION, SOLUTION INTRAVENOUS
Status: DISCONTINUED | OUTPATIENT
Start: 2025-04-14 | End: 2025-04-14

## 2025-04-14 RX ORDER — DIPHENHYDRAMINE HYDROCHLORIDE 50 MG/ML
25 INJECTION, SOLUTION INTRAMUSCULAR; INTRAVENOUS EVERY 6 HOURS PRN
Status: DISCONTINUED | OUTPATIENT
Start: 2025-04-14 | End: 2025-04-14 | Stop reason: HOSPADM

## 2025-04-14 RX ORDER — ESMOLOL HYDROCHLORIDE 10 MG/ML
INJECTION INTRAVENOUS
Status: DISCONTINUED | OUTPATIENT
Start: 2025-04-14 | End: 2025-04-14

## 2025-04-14 RX ORDER — ETOMIDATE 2 MG/ML
INJECTION INTRAVENOUS
Status: DISCONTINUED | OUTPATIENT
Start: 2025-04-14 | End: 2025-04-14

## 2025-04-14 RX ORDER — SODIUM CHLORIDE 0.9 % (FLUSH) 0.9 %
10 SYRINGE (ML) INJECTION
Status: DISCONTINUED | OUTPATIENT
Start: 2025-04-14 | End: 2025-04-14 | Stop reason: HOSPADM

## 2025-04-14 RX ORDER — ROCURONIUM BROMIDE 10 MG/ML
INJECTION, SOLUTION INTRAVENOUS
Status: DISCONTINUED | OUTPATIENT
Start: 2025-04-14 | End: 2025-04-14

## 2025-04-14 RX ORDER — ALBUTEROL SULFATE 90 UG/1
INHALANT RESPIRATORY (INHALATION)
Status: DISCONTINUED | OUTPATIENT
Start: 2025-04-14 | End: 2025-04-14

## 2025-04-14 RX ORDER — FENTANYL CITRATE 50 UG/ML
INJECTION, SOLUTION INTRAMUSCULAR; INTRAVENOUS
Status: DISCONTINUED | OUTPATIENT
Start: 2025-04-14 | End: 2025-04-14

## 2025-04-14 RX ORDER — HALOPERIDOL LACTATE 5 MG/ML
0.5 INJECTION, SOLUTION INTRAMUSCULAR EVERY 10 MIN PRN
Status: DISCONTINUED | OUTPATIENT
Start: 2025-04-14 | End: 2025-04-14 | Stop reason: HOSPADM

## 2025-04-14 RX ORDER — HYDRALAZINE HYDROCHLORIDE 20 MG/ML
10 INJECTION INTRAMUSCULAR; INTRAVENOUS EVERY 4 HOURS PRN
Status: DISCONTINUED | OUTPATIENT
Start: 2025-04-14 | End: 2025-04-15 | Stop reason: HOSPADM

## 2025-04-14 RX ORDER — LIDOCAINE HYDROCHLORIDE 20 MG/ML
INJECTION, SOLUTION EPIDURAL; INFILTRATION; INTRACAUDAL; PERINEURAL
Status: DISCONTINUED | OUTPATIENT
Start: 2025-04-14 | End: 2025-04-14

## 2025-04-14 RX ORDER — HYDROCODONE BITARTRATE AND ACETAMINOPHEN 5; 325 MG/1; MG/1
1 TABLET ORAL EVERY 4 HOURS PRN
Refills: 0 | Status: DISCONTINUED | OUTPATIENT
Start: 2025-04-14 | End: 2025-04-15 | Stop reason: HOSPADM

## 2025-04-14 RX ORDER — HEPARIN SODIUM 10000 [USP'U]/100ML
INJECTION, SOLUTION INTRAVENOUS CONTINUOUS PRN
Status: DISCONTINUED | OUTPATIENT
Start: 2025-04-14 | End: 2025-04-14

## 2025-04-14 RX ORDER — ENOXAPARIN SODIUM 100 MG/ML
0.5 INJECTION SUBCUTANEOUS ONCE
Status: COMPLETED | OUTPATIENT
Start: 2025-04-14 | End: 2025-04-14

## 2025-04-14 RX ORDER — GLUCAGON 1 MG
1 KIT INJECTION
Status: DISCONTINUED | OUTPATIENT
Start: 2025-04-14 | End: 2025-04-14 | Stop reason: HOSPADM

## 2025-04-14 RX ORDER — MORPHINE SULFATE 4 MG/ML
2 INJECTION, SOLUTION INTRAMUSCULAR; INTRAVENOUS EVERY 4 HOURS PRN
Status: DISCONTINUED | OUTPATIENT
Start: 2025-04-14 | End: 2025-04-15 | Stop reason: HOSPADM

## 2025-04-14 RX ORDER — ONDANSETRON HYDROCHLORIDE 2 MG/ML
INJECTION, SOLUTION INTRAVENOUS
Status: DISCONTINUED | OUTPATIENT
Start: 2025-04-14 | End: 2025-04-14

## 2025-04-14 RX ORDER — HEPARIN SODIUM 1000 [USP'U]/ML
INJECTION, SOLUTION INTRAVENOUS; SUBCUTANEOUS
Status: DISCONTINUED | OUTPATIENT
Start: 2025-04-14 | End: 2025-04-14

## 2025-04-14 RX ORDER — MUPIROCIN 20 MG/G
OINTMENT TOPICAL ONCE
Status: DISCONTINUED | OUTPATIENT
Start: 2025-04-14 | End: 2025-04-14 | Stop reason: HOSPADM

## 2025-04-14 RX ORDER — CEFUROXIME SODIUM 1.5 G/16ML
1.5 INJECTION, POWDER, FOR SOLUTION INTRAVENOUS ONCE
Status: DISCONTINUED | OUTPATIENT
Start: 2025-04-14 | End: 2025-04-14

## 2025-04-14 RX ORDER — OXYCODONE HYDROCHLORIDE 5 MG/1
5 TABLET ORAL
Status: DISCONTINUED | OUTPATIENT
Start: 2025-04-14 | End: 2025-04-14 | Stop reason: HOSPADM

## 2025-04-14 RX ORDER — ACETAMINOPHEN 10 MG/ML
1000 INJECTION, SOLUTION INTRAVENOUS ONCE
Status: DISCONTINUED | OUTPATIENT
Start: 2025-04-14 | End: 2025-04-14 | Stop reason: HOSPADM

## 2025-04-14 RX ORDER — DOXYCYCLINE HYCLATE 100 MG
100 TABLET ORAL EVERY 12 HOURS
Status: DISCONTINUED | OUTPATIENT
Start: 2025-04-15 | End: 2025-04-15 | Stop reason: HOSPADM

## 2025-04-14 RX ORDER — CEFUROXIME SODIUM 1.5 G/16ML
1.5 INJECTION, POWDER, FOR SOLUTION INTRAVENOUS ONCE
Status: COMPLETED | OUTPATIENT
Start: 2025-04-14 | End: 2025-04-14

## 2025-04-14 RX ORDER — PROPOFOL 10 MG/ML
VIAL (ML) INTRAVENOUS
Status: DISCONTINUED | OUTPATIENT
Start: 2025-04-14 | End: 2025-04-14

## 2025-04-14 RX ADMIN — ONDANSETRON 4 MG: 2 INJECTION INTRAMUSCULAR; INTRAVENOUS at 11:04

## 2025-04-14 RX ADMIN — ACETAMINOPHEN 1000 MG: 10 INJECTION, SOLUTION INTRAVENOUS at 12:04

## 2025-04-14 RX ADMIN — PHENYLEPHRINE HYDROCHLORIDE 200 MCG: 10 INJECTION INTRAVENOUS at 08:04

## 2025-04-14 RX ADMIN — HYDROCODONE BITARTRATE AND ACETAMINOPHEN 1 TABLET: 5; 325 TABLET ORAL at 07:04

## 2025-04-14 RX ADMIN — LIDOCAINE HYDROCHLORIDE 100 MG: 20 INJECTION, SOLUTION EPIDURAL; INFILTRATION; INTRACAUDAL; PERINEURAL at 08:04

## 2025-04-14 RX ADMIN — ROCURONIUM BROMIDE 60 MG: 10 SOLUTION INTRAVENOUS at 08:04

## 2025-04-14 RX ADMIN — PHENYLEPHRINE HYDROCHLORIDE 100 MCG: 10 INJECTION INTRAVENOUS at 09:04

## 2025-04-14 RX ADMIN — HYDROMORPHONE HYDROCHLORIDE 0.2 MG: 2 INJECTION, SOLUTION INTRAMUSCULAR; INTRAVENOUS; SUBCUTANEOUS at 01:04

## 2025-04-14 RX ADMIN — SODIUM CHLORIDE, SODIUM GLUCONATE, SODIUM ACETATE, POTASSIUM CHLORIDE AND MAGNESIUM CHLORIDE: 526; 502; 368; 37; 30 INJECTION, SOLUTION INTRAVENOUS at 08:04

## 2025-04-14 RX ADMIN — HEPARIN SODIUM 1200 UNITS/HR: 10000 INJECTION, SOLUTION INTRAVENOUS at 09:04

## 2025-04-14 RX ADMIN — SUGAMMADEX 200 MG: 100 INJECTION, SOLUTION INTRAVENOUS at 11:04

## 2025-04-14 RX ADMIN — PROPOFOL 40 MG: 10 INJECTION, EMULSION INTRAVENOUS at 08:04

## 2025-04-14 RX ADMIN — ETOMIDATE 8 MG: 2 INJECTION INTRAVENOUS at 08:04

## 2025-04-14 RX ADMIN — CEFUROXIME 1.5 G: 1.5 INJECTION, POWDER, FOR SOLUTION INTRAVENOUS at 09:04

## 2025-04-14 RX ADMIN — HEPARIN SODIUM 5000 UNITS: 1000 INJECTION, SOLUTION INTRAVENOUS; SUBCUTANEOUS at 08:04

## 2025-04-14 RX ADMIN — ROCURONIUM BROMIDE 10 MG: 10 SOLUTION INTRAVENOUS at 09:04

## 2025-04-14 RX ADMIN — ESMOLOL HYDROCHLORIDE 30 MG: 100 INJECTION, SOLUTION INTRAVENOUS at 08:04

## 2025-04-14 RX ADMIN — HEPARIN SODIUM 5000 UNITS: 1000 INJECTION, SOLUTION INTRAVENOUS; SUBCUTANEOUS at 09:04

## 2025-04-14 RX ADMIN — ROCURONIUM BROMIDE 10 MG: 10 SOLUTION INTRAVENOUS at 10:04

## 2025-04-14 RX ADMIN — PROTAMINE SULFATE 40 MG: 10 INJECTION, SOLUTION INTRAVENOUS at 11:04

## 2025-04-14 RX ADMIN — FUROSEMIDE 40 MG: 10 INJECTION, SOLUTION INTRAMUSCULAR; INTRAVENOUS at 11:04

## 2025-04-14 RX ADMIN — ALUMINUM HYDROXIDE, MAGNESIUM HYDROXIDE, AND DIMETHICONE 30 ML: 200; 20; 200 SUSPENSION ORAL at 09:04

## 2025-04-14 RX ADMIN — HYDROMORPHONE HYDROCHLORIDE 0.2 MG: 2 INJECTION, SOLUTION INTRAMUSCULAR; INTRAVENOUS; SUBCUTANEOUS at 12:04

## 2025-04-14 RX ADMIN — ENOXAPARIN SODIUM 50 MG: 100 INJECTION SUBCUTANEOUS at 06:04

## 2025-04-14 RX ADMIN — ROCURONIUM BROMIDE 10 MG: 10 SOLUTION INTRAVENOUS at 08:04

## 2025-04-14 RX ADMIN — ALBUTEROL SULFATE 4 PUFF: 90 AEROSOL, METERED RESPIRATORY (INHALATION) at 11:04

## 2025-04-14 RX ADMIN — HEPARIN SODIUM 3000 UNITS: 1000 INJECTION, SOLUTION INTRAVENOUS; SUBCUTANEOUS at 09:04

## 2025-04-14 RX ADMIN — PHENYLEPHRINE HYDROCHLORIDE 100 MCG: 10 INJECTION INTRAVENOUS at 10:04

## 2025-04-14 RX ADMIN — MIDAZOLAM HYDROCHLORIDE 2 MG: 1 INJECTION, SOLUTION INTRAMUSCULAR; INTRAVENOUS at 08:04

## 2025-04-14 RX ADMIN — PHENYLEPHRINE HYDROCHLORIDE 25 MCG/MIN: 10 INJECTION INTRAVENOUS at 10:04

## 2025-04-14 RX ADMIN — FENTANYL CITRATE 25 MCG: 50 INJECTION, SOLUTION INTRAMUSCULAR; INTRAVENOUS at 08:04

## 2025-04-14 NOTE — ANESTHESIA PROCEDURE NOTES
Arterial    Diagnosis: Atrial fibrillation    Patient location during procedure: done in OR  Timeout: 4/14/2025 8:20 AM  Procedure end time: 4/14/2025 8:29 AM    Staffing  Authorizing Provider: Agustín Tovar MD  Performing Provider: Agustín Tovar MD    Staffing  Performed by: Agustín Tovar MD  Authorized by: Agustín Tovar MD    Anesthesiologist was present at the time of the procedure.    Preanesthetic Checklist  Completed: patient identified, IV checked, site marked, risks and benefits discussed, surgical consent, monitors and equipment checked, pre-op evaluation, timeout performed and anesthesia consent givenArterial  Skin Prep: chlorhexidine gluconate  Local Infiltration: none  Orientation: right  Location: radial    Catheter Size: 20 G  Catheter placement by Anatomical landmarks. Heme positive aspiration all ports. Insertion Attempts: 2  Assessment  Dressing: secured with tape and tegaderm  Patient: Tolerated well

## 2025-04-14 NOTE — ANESTHESIA PREPROCEDURE EVALUATION
04/14/2025  Babak Scales is a 79 y.o., male.        Past Medical History:   Diagnosis Date    Arthritis     Asthma     Atrial fibrillation     xarelto    Bladder cancer     Bladder disorder     CAD (coronary artery disease)     CHF (congestive heart failure)     COPD (chronic obstructive pulmonary disease)     Diverticulitis     Enlarged prostate     GERD (gastroesophageal reflux disease)     Heart damage     History of kidney stones     HLD (hyperlipidemia)     [place] (hard of hearing)     Insomnia     Obesity     Skin cancer     SOB (shortness of breath)     Stroke     Thyroid disease      Past Surgical History:   Procedure Laterality Date    BLADDER SURGERY      CERVICAL FUSION      CHOLECYSTECTOMY      COLONOSCOPY      ESOPHAGOGASTRODUODENOSCOPY      EXCISION OF PTERYGIUM Left 06/25/2024    Procedure: EXCISION, PTERYGIUM WITH MMC AND CONJ AUTOGRAFT - OS;  Surgeon: Rohith Crespo MD;  Location: [facility] OR;  Service: Ophthalmology;  Laterality: Left;    EXCISIONAL HEMORRHOIDECTOMY      FUSION OF SACROILIAC JOINT      INSERTION OF PACEMAKER      2021    PHACOEMULSIFICATION, CATARACT, WITH IOL INSERTION Right 08/27/2024    Procedure: PHACOEMULSIFICATION, CATARACT, WITH IOL INSERTION- OD;  Surgeon: Rohith Crespo MD;  Location: [facility] OR;  Service: Ophthalmology;  Laterality: Right;    PHACOEMULSIFICATION, CATARACT, WITH IOL INSERTION Left 09/03/2024    Procedure: PHACOEMULSIFICATION, CATARACT, WITH IOL INSERTION- OS;  Surgeon: Rohith Crespo MD;  Location: [facility] OR;  Service: Ophthalmology;  Laterality: Left;    REPLACEMENT OF IMPLANTABLE CARDIOVERTER-DEFIBRILLATOR (ICD) BATTERY  07/2022    skin cancer removal Right     skin underneath the eye     Facility-Administered Medications as of    Medication Dose Route Frequency Provider Last Rate Last Admin    0.9%  NaCl infusion   Intravenous Once Jono Rutherford MD         sodium chloride 0.9% flush 10 mL  10 mL Intravenous PRN Jono Rutherford MD         Outpatient Medications as of    Medication Sig Dispense Refill    albuterol (VENTOLIN HFA) 90 mcg/actuation inhaler Inhale 2 puffs into the lungs every 6 (six) hours as needed for Wheezing. Rescue 18 g 11    alfuzosin (UROXATRAL) 10 mg Tb24 Take 10 mg by mouth once daily.      amiodarone (PACERONE) 200 MG Tab Take 1 tablet by mouth Daily.      digoxin (LANOXIN) 125 mcg tablet Take 125 mcg by mouth once daily.      ENTRESTO 24-26 mg per tablet Take 1 tablet by mouth 2 (two) times daily.      furosemide (LASIX) 20 MG tablet Take 20 mg by mouth every morning.      levalbuterol (XOPENEX HFA) 45 mcg/actuation inhaler See Instructions, INHALE 2 PUFFS BY MOUTH EVERY FOUR (4) HOURS AS NEEDED FOR WHEEZING, # 15 cap(s), 5 Refill(s), Pharmacy: [facility], 167, cm, Height/Length Dosing, 02/14/21 14:28:00 CST, 74, kg, Weight Dosing, 02/14/21 14:28:00 CST 15 g 11    levothyroxine (SYNTHROID) 50 MCG tablet Take 50 mcg by mouth once daily.      loratadine (CLARITIN) 10 mg tablet Take 10 mg by mouth once daily.      mv-min-folic-K1-lycopen-lutein (CENTRUM SILVER MEN) 621--300 mcg Tab Take 1 tablet by mouth once daily.      nitroGLYCERIN (NITROSTAT) 0.4 MG SL tablet Place 0.4 mg under the tongue every 5 (five) minutes as needed for Chest pain.      omeprazole (PRILOSEC OTC) 20 MG tablet Take 20 mg by mouth once daily.      tadalafiL (CIALIS) 5 MG tablet Take 5 mg by mouth daily as needed.      TRELEGY ELLIPTA 100-62.5-25 mcg DsDv Inhale 1 puff into the lungs once daily. 60 each 11    XARELTO 20 mg Tab Take 20 mg by mouth once daily.      atorvastatin (LIPITOR) 40 MG tablet Take 40 mg by mouth every evening.      azithromycin (Z-ADIS) 250 MG tablet Take 1 tablet (250 mg total) by mouth once daily. Take first 2 tablets together, then 1 every day until finished. (Patient not taking: Reported on 3/17/2025) 6 tablet 0                 Pre-op  Assessment    I have reviewed the Patient Summary Reports.     I have reviewed the Nursing Notes. I have reviewed the NPO Status.   I have reviewed the Medications.     Review of Systems  Anesthesia Hx:  No problems with previous Anesthesia                Social:  Former Smoker       Hematology/Oncology:                        --  Cancer in past history:                     Cardiovascular:    Pacemaker     Dysrhythmias   CHF    hyperlipidemia   ECG has been reviewed.                            Pulmonary:   COPD Asthma  Shortness of breath                  Renal/:   renal calculi BPH              Hepatic/GI:     GERD                Musculoskeletal:  Arthritis               Neurological:   CVA                                    Endocrine:        Obesity / BMI > 30      Physical Exam  General: Cooperative, Alert and Oriented    Airway:  Mallampati: II   Mouth Opening: Normal  TM Distance: Normal  Tongue: Normal  Neck ROM: Normal ROM    Dental:  Dentures        Anesthesia Plan  Type of Anesthesia, risks & benefits discussed:    Anesthesia Type: Gen ETT  Intra-op Monitoring Plan: Standard ASA Monitors and Art Line  Post Op Pain Control Plan: multimodal analgesia  Induction:  IV  Airway Plan: Direct, Post-Induction  Informed Consent: Informed consent signed with the Patient and all parties understand the risks and agree with anesthesia plan.  All questions answered. Patient consented to blood products? Yes  ASA Score: 3  Day of Surgery Review of History & Physical: H&P Update referred to the surgeon/provider.I have interviewed and examined the patient. I have reviewed the patient's H&P dated: There are no significant changes.     Ready For Surgery From Anesthesia Perspective.     .

## 2025-04-14 NOTE — ANESTHESIA POSTPROCEDURE EVALUATION
Anesthesia Post Evaluation    Patient: Babak Scales    Procedure(s) Performed: Procedure(s) (LRB):  Left atrial appendage closure device (N/A)  Ablation (N/A)  Transesophageal echo (KIRBY) intra-procedure log documentation (N/A)    Final Anesthesia Type: general      Patient location during evaluation: PACU  Patient participation: Yes- Able to Participate  Level of consciousness: awake  Post-procedure vital signs: reviewed and stable  Pain management: adequate  Airway patency: patent  TERESO mitigation strategies: Multimodal analgesia  PONV status at discharge: No PONV  Anesthetic complications: no      Cardiovascular status: hemodynamically stable  Respiratory status: spontaneous ventilation  Hydration status: euvolemic  Follow-up not needed.              Vitals Value Taken Time   BP 98/56 04/14/25 13:01   Temp 36 °C (96.8 °F) 04/14/25 12:00   Pulse 80 04/14/25 13:03   Resp 12 04/14/25 13:03   SpO2 97 % 04/14/25 13:03   Vitals shown include unfiled device data.      No case tracking events are documented in the log.      Pain/Tanesha Score: Tanesha Score: 8 (4/14/2025 11:52 AM)

## 2025-04-14 NOTE — DISCHARGE SUMMARY
[facility] - Cath Lab Services  Discharge Note  Short Stay    Procedure(s) (LRB):  Left atrial appendage closure device (N/A)  Ablation (N/A)  Transesophageal echo (KIRBY) intra-procedure log documentation (N/A)      OUTCOME: Patient tolerated treatment/procedure well without complication and is now ready for discharge.    DISPOSITION: Home or Self Care    FINAL DIAGNOSIS:  Persistent atrial fibrillation    FOLLOWUP: In clinic    DISCHARGE INSTRUCTIONS:  No discharge procedures on file.     TIME SPENT ON DISCHARGE: 15 minutes

## 2025-04-14 NOTE — TRANSFER OF CARE
"Anesthesia Transfer of Care Note    Patient: Babak Scales    Procedure(s) Performed: Procedure(s) (LRB):  Left atrial appendage closure device (N/A)  Ablation (N/A)  Transesophageal echo (KIRBY) intra-procedure log documentation (N/A)    Patient location: PACU    Anesthesia Type: general    Transport from OR: Transported from OR on room air with adequate spontaneous ventilation    Post pain: adequate analgesia    Post assessment: no apparent anesthetic complications    Post vital signs: stable    Level of consciousness: awake, responds to stimulation and alert    Nausea/Vomiting: no nausea/vomiting    Complications: none    Transfer of care protocol was followed      Last vitals: Visit Vitals  /74   Pulse 82   Temp 36.4 °C (97.5 °F) (Oral)   Resp 20   Ht 5' 10" (1.778 m)   Wt 94.7 kg (208 lb 12.4 oz)   SpO2 (!) 94%   BMI 29.96 kg/m²     "

## 2025-04-14 NOTE — ANESTHESIA PROCEDURE NOTES
KIRBY    Diagnosis: Atrial fibrillation  Patient location during procedure: OR  Timeout: 4/14/2025 8:32 AM  Procedure end time: 4/14/2025 8:42 AM  Surgery related to: Watchman/Ablation  Exam type: Baseline    Staffing  Performed: anesthesiologist     Anesthesiologist: Agustín Tovar MD        Anesthesiologist Present  Yes      Setup & Induction  Probe Insertion: easy  Exam: limitedDoppler Echo: 2D and color flow mapping.  Exam     Left Heart  Left Atruim: normal        LVAD  Estimated Ejection Fraction: 45-54% mild  Regional Wall Abnormalities: no RWMA            Right Heart    Intra Atrial Septum  PFO: no shunt by color flow doppler          Right Ventricle                          Effusions none    SummaryFindings discussed with surgeon.    Other Findings   Limited study to make TIARA measurements in 4 planes, evaluate atrial shunt and evaluate pericardial fluid.For catheter based structural heart procedure.

## 2025-04-14 NOTE — Clinical Note
The catheter was inserted into the and was inserted over the wire into the left atrium. An angiography was performed of the Left Atrium/TIARA. The angiography was performed via power injection.

## 2025-04-14 NOTE — Clinical Note
DEVICE WATCHMAN FLX PRO 27MM - [id number] was deployed to the left atrial appendage. Tug Test performed prior to release under fluoro and KIRBY guidance

## 2025-04-14 NOTE — Clinical Note
EP catheter inserted at the left atrium. Libtayo Pregnancy And Lactation Text: This medication is contraindicated in pregnancy and when breast feeding.

## 2025-04-14 NOTE — Clinical Note
DEVICE [employer] FLX PRO 27MM - [id number] was inserted into the left atrial appendage.

## 2025-04-15 VITALS
HEART RATE: 80 BPM | BODY MASS INDEX: 29.89 KG/M2 | WEIGHT: 208.75 LBS | SYSTOLIC BLOOD PRESSURE: 121 MMHG | RESPIRATION RATE: 16 BRPM | TEMPERATURE: 99 F | HEIGHT: 70 IN | OXYGEN SATURATION: 90 % | DIASTOLIC BLOOD PRESSURE: 77 MMHG

## 2025-04-15 LAB
ANION GAP SERPL CALC-SCNC: 7 MEQ/L
APICAL FOUR CHAMBER EJECTION FRACTION: 41 %
AV INDEX (PROSTH): 0.59
AV MEAN GRADIENT: 3 MMHG
AV PEAK GRADIENT: 6 MMHG
AV VALVE AREA BY VELOCITY RATIO: 2.1 CM²
AV VALVE AREA: 1.8 CM²
AV VELOCITY RATIO: 0.67
BASOPHILS # BLD AUTO: 0.03 X10(3)/MCL
BASOPHILS NFR BLD AUTO: 0.3 %
BSA FOR ECHO PROCEDURE: 2.16 M2
BUN SERPL-MCNC: 14.5 MG/DL (ref 8.4–25.7)
CALCIUM SERPL-MCNC: 7.4 MG/DL (ref 8.8–10)
CHLORIDE SERPL-SCNC: 102 MMOL/L (ref 98–107)
CO2 SERPL-SCNC: 27 MMOL/L (ref 23–31)
CREAT SERPL-MCNC: 0.74 MG/DL (ref 0.72–1.25)
CREAT/UREA NIT SERPL: 20
CV ECHO LV RWT: 0.39 CM
DOP CALC AO PEAK VEL: 1.2 M/S
DOP CALC AO VTI: 19.4 CM
DOP CALC LVOT AREA: 3.1 CM2
DOP CALC LVOT DIAMETER: 2 CM
DOP CALC LVOT PEAK VEL: 0.8 M/S
DOP CALC LVOT STROKE VOLUME: 35.8 CM3
DOP CALC MV VTI: 20.9 CM
DOP CALCLVOT PEAK VEL VTI: 11.4 CM
E WAVE DECELERATION TIME: 195 MSEC
E/A RATIO: 1.69
E/E' RATIO: 9 M/S
ECHO LV POSTERIOR WALL: 1.2 CM (ref 0.6–1.1)
EOSINOPHIL # BLD AUTO: 0.07 X10(3)/MCL (ref 0–0.9)
EOSINOPHIL NFR BLD AUTO: 0.6 %
ERYTHROCYTE [DISTWIDTH] IN BLOOD BY AUTOMATED COUNT: 15.7 % (ref 11.5–17)
FRACTIONAL SHORTENING: 11.5 % (ref 28–44)
GFR SERPLBLD CREATININE-BSD FMLA CKD-EPI: >60 ML/MIN/1.73/M2
GLUCOSE SERPL-MCNC: 124 MG/DL (ref 82–115)
HCT VFR BLD AUTO: 39.6 % (ref 42–52)
HGB BLD-MCNC: 12.7 G/DL (ref 14–18)
IMM GRANULOCYTES # BLD AUTO: 0.04 X10(3)/MCL (ref 0–0.04)
IMM GRANULOCYTES NFR BLD AUTO: 0.4 %
INTERVENTRICULAR SEPTUM: 1.4 CM (ref 0.6–1.1)
LEFT ATRIUM SIZE: 4.5 CM
LEFT INTERNAL DIMENSION IN SYSTOLE: 5.4 CM (ref 2.1–4)
LEFT VENTRICLE DIASTOLIC VOLUME INDEX: 89.2 ML/M2
LEFT VENTRICLE DIASTOLIC VOLUME: 190 ML
LEFT VENTRICLE END DIASTOLIC VOLUME APICAL 4 CHAMBER: 109 ML
LEFT VENTRICLE MASS INDEX: 168.8 G/M2
LEFT VENTRICLE SYSTOLIC VOLUME INDEX: 65.7 ML/M2
LEFT VENTRICLE SYSTOLIC VOLUME: 140 ML
LEFT VENTRICULAR INTERNAL DIMENSION IN DIASTOLE: 6.1 CM (ref 3.5–6)
LEFT VENTRICULAR MASS: 359.6 G
LV LATERAL E/E' RATIO: 10.2 M/S
LV SEPTAL E/E' RATIO: 8.7 M/S
LVED V (TEICH): 190 ML
LVES V (TEICH): 140 ML
LVOT MG: 1 MMHG
LVOT MV: 0.54 CM/S
LYMPHOCYTES # BLD AUTO: 0.97 X10(3)/MCL (ref 0.6–4.6)
LYMPHOCYTES NFR BLD AUTO: 8.7 %
MCH RBC QN AUTO: 32.2 PG (ref 27–31)
MCHC RBC AUTO-ENTMCNC: 32.1 G/DL (ref 33–36)
MCV RBC AUTO: 100.3 FL (ref 80–94)
MONOCYTES # BLD AUTO: 0.87 X10(3)/MCL (ref 0.1–1.3)
MONOCYTES NFR BLD AUTO: 7.8 %
MV MEAN GRADIENT: 1 MMHG
MV PEAK A VEL: 0.36 M/S
MV PEAK E VEL: 0.61 M/S
MV PEAK GRADIENT: 3 MMHG
MV STENOSIS PRESSURE HALF TIME: 39 MS
MV VALVE AREA BY CONTINUITY EQUATION: 1.71 CM2
MV VALVE AREA P 1/2 METHOD: 5.64 CM2
NEUTROPHILS # BLD AUTO: 9.19 X10(3)/MCL (ref 2.1–9.2)
NEUTROPHILS NFR BLD AUTO: 82.2 %
NRBC BLD AUTO-RTO: 0 %
OHS QRS DURATION: 150 MS
OHS QRS DURATION: 198 MS
OHS QRS DURATION: 200 MS
OHS QRS DURATION: 202 MS
OHS QTC CALCULATION: 519 MS
OHS QTC CALCULATION: 531 MS
OHS QTC CALCULATION: 534 MS
OHS QTC CALCULATION: 551 MS
PISA TR MAX VEL: 3.2 M/S
PLATELET # BLD AUTO: 152 X10(3)/MCL (ref 130–400)
PMV BLD AUTO: 9 FL (ref 7.4–10.4)
POTASSIUM SERPL-SCNC: 4.1 MMOL/L (ref 3.5–5.1)
PV PEAK GRADIENT: 4 MMHG
PV PEAK VELOCITY: 1.06 M/S
RA PRESSURE ESTIMATED: 8 MMHG
RBC # BLD AUTO: 3.95 X10(6)/MCL (ref 4.7–6.1)
RV TB RVSP: 11 MMHG
SODIUM SERPL-SCNC: 136 MMOL/L (ref 136–145)
TDI LATERAL: 0.06 M/S
TDI SEPTAL: 0.07 M/S
TDI: 0.07 M/S
TR MAX PG: 31 MMHG
TRICUSPID ANNULAR PLANE SYSTOLIC EXCURSION: 2.82 CM
TV REST PULMONARY ARTERY PRESSURE: 49 MMHG
WBC # BLD AUTO: 11.17 X10(3)/MCL (ref 4.5–11.5)
Z-SCORE OF LEFT VENTRICULAR DIMENSION IN END DIASTOLE: -1.07
Z-SCORE OF LEFT VENTRICULAR DIMENSION IN END SYSTOLE: 2.1

## 2025-04-15 PROCEDURE — 63600175 PHARM REV CODE 636 W HCPCS: Performed by: NURSE PRACTITIONER

## 2025-04-15 PROCEDURE — 27000221 HC OXYGEN, UP TO 24 HOURS

## 2025-04-15 PROCEDURE — 93010 ELECTROCARDIOGRAM REPORT: CPT | Mod: ,,, | Performed by: INTERNAL MEDICINE

## 2025-04-15 PROCEDURE — 25000003 PHARM REV CODE 250: Performed by: INTERNAL MEDICINE

## 2025-04-15 PROCEDURE — 93010 ELECTROCARDIOGRAM REPORT: CPT | Mod: ,,, | Performed by: STUDENT IN AN ORGANIZED HEALTH CARE EDUCATION/TRAINING PROGRAM

## 2025-04-15 PROCEDURE — 93005 ELECTROCARDIOGRAM TRACING: CPT

## 2025-04-15 PROCEDURE — 85025 COMPLETE CBC W/AUTO DIFF WBC: CPT | Performed by: INTERNAL MEDICINE

## 2025-04-15 PROCEDURE — 80048 BASIC METABOLIC PNL TOTAL CA: CPT | Performed by: INTERNAL MEDICINE

## 2025-04-15 PROCEDURE — 36415 COLL VENOUS BLD VENIPUNCTURE: CPT | Performed by: INTERNAL MEDICINE

## 2025-04-15 PROCEDURE — 94760 N-INVAS EAR/PLS OXIMETRY 1: CPT

## 2025-04-15 PROCEDURE — 25000003 PHARM REV CODE 250: Performed by: NURSE PRACTITIONER

## 2025-04-15 RX ORDER — SUCRALFATE 1 G/1
1 TABLET ORAL 4 TIMES DAILY
Qty: 28 TABLET | Refills: 0 | Status: SHIPPED | OUTPATIENT
Start: 2025-04-15 | End: 2025-04-22

## 2025-04-15 RX ORDER — PANTOPRAZOLE SODIUM 40 MG/1
40 TABLET, DELAYED RELEASE ORAL DAILY
Qty: 30 TABLET | Refills: 0 | Status: SHIPPED | OUTPATIENT
Start: 2025-04-15 | End: 2025-05-15

## 2025-04-15 RX ORDER — DOXYCYCLINE HYCLATE 100 MG
100 TABLET ORAL EVERY 12 HOURS
Qty: 6 TABLET | Refills: 0 | Status: SHIPPED | OUTPATIENT
Start: 2025-04-15 | End: 2025-04-18

## 2025-04-15 RX ORDER — ONDANSETRON HYDROCHLORIDE 2 MG/ML
4 INJECTION, SOLUTION INTRAVENOUS EVERY 6 HOURS PRN
Status: DISCONTINUED | OUTPATIENT
Start: 2025-04-15 | End: 2025-04-15 | Stop reason: HOSPADM

## 2025-04-15 RX ORDER — ONDANSETRON 4 MG/1
4 TABLET, FILM COATED ORAL EVERY 8 HOURS PRN
Qty: 15 TABLET | Refills: 0 | Status: SHIPPED | OUTPATIENT
Start: 2025-04-15 | End: 2025-04-20

## 2025-04-15 RX ADMIN — ACETAMINOPHEN 650 MG: 325 TABLET ORAL at 11:04

## 2025-04-15 RX ADMIN — ACETAMINOPHEN 650 MG: 325 TABLET ORAL at 05:04

## 2025-04-15 RX ADMIN — DOXYCYCLINE HYCLATE 100 MG: 100 TABLET, COATED ORAL at 09:04

## 2025-04-15 RX ADMIN — HYDROCODONE BITARTRATE AND ACETAMINOPHEN 1 TABLET: 5; 325 TABLET ORAL at 12:04

## 2025-04-15 RX ADMIN — RIVAROXABAN 20 MG: 10 TABLET, FILM COATED ORAL at 09:04

## 2025-04-15 RX ADMIN — ALUMINUM HYDROXIDE, MAGNESIUM HYDROXIDE, AND DIMETHICONE 30 ML: 200; 20; 200 SUSPENSION ORAL at 05:04

## 2025-04-15 RX ADMIN — ONDANSETRON 4 MG: 2 INJECTION INTRAMUSCULAR; INTRAVENOUS at 09:04

## 2025-04-15 NOTE — DISCHARGE INSTRUCTIONS
· POST-OP CARE:  o May remove dressing 24 hours after procedure. (Soak dressing with warm water and gently peel off. DO NOT RIP).  o You may shower with antibacterial soap and water only. NO tub bathing/swimming for 3 days!  o Keep the site clean and dry. No ointments, powder, cologne, or lotion near the site until fully healed. (About 2 weeks)  o Monitor site for signs or symptoms of infection  § Fever >101  § Yellow/green drainage  § Swelling  § Worsening pain  § Dizziness/fainting    o NO LIFTING PUSHING OR PULLING ANYTHING GREATER THAN 5 POUNDS (HALF A GALLON OF MILK) FOR 1 WEEK.  o NO DRIVING FOR 72 HOURS. (This includes all motor vehicles)    o If you begin to bleed, lay flat immediately, apply firm direct pressure to the site. CALL 911. (THIS IS AN EMERGENCY!!)        Our goal at [facility] is to always give you outstanding care and exceptional service. You may receive a survey from [employer] by mail, text or e-mail in the next 24-48 hours asking about the care you received with us. The survey should only take 5-10 minutes to complete and is very important to us.     Your feedback provides us with a way to recognize our staff who work tirelessly to provide the best care! Also, your responses help us learn how to improve when your experience was below our aspiration of excellence. We are always looking for ways to improve your stay. We WILL use your feedback to continue making improvements to help us provide the highest quality care. We keep your personal information and feedback confidential. We appreciate your time completing this survey and can't wait to hear from you!!!    We look forward to your continued care with us! Thanks so much for choosing [facility] for your healthcare needs!

## 2025-04-15 NOTE — NURSING
AVS virtually reviewed with patient and family in its entirety with emphasis on diet, medications, follow-up appointments and reasons to return to the ED. Patient also encouraged to utilize their patient portal. Ease and convenience of use reiterated. Education complete and patient and family voiced understanding. All questions answered. Discharge teaching complete.

## 2025-04-15 NOTE — DISCHARGE SUMMARY
[facility]  Cardiology  Discharge Summary    Patient Name: Babak Scales  MRN: [id number]  Admission Date: 4/14/2025  Hospital Length of Stay: 1 days  Code Status: Prior   Attending Physician: Jono Rutherford MD   Primary Care Physician: Guillermo Brown Jr., MD  Expected Discharge Date:   Principal Problem:Persistent atrial fibrillation    Subjective:     Hospital Course:   This is a 79-year-old male, who is known to Dr. Gil and Dr. Rutherford, with history of CAD, HLD, PAF, COPD, hypothyroidism, bladder cancer, NICMO/chronic and reduced LV systolic function/CRT-D ([facility]).  He presented to [facility] on 04/14/2025 for ablation and Watchman implantation.  Patient underwent successful atrial fib ablation, atrial flutter ablation, and attempted atrial tach ablation.  Atrial tach ablation was unsuccessful.  Patient underwent successful Watchman implantation.  Bilateral groin benign.  Follow-up with Domonique Cheek NP on 04/24/2025 at 9:00 a.m. at [facility].  Patient is stable for discharge.    Review of Systems   Constitutional: Negative for chills and fever.   Cardiovascular:  Negative for chest pain and palpitations.   Respiratory:  Negative for shortness of breath.    Psychiatric/Behavioral:  Negative for altered mental status.            Objective:     Vital Signs (Most Recent):  Temp: 98 °F (36.7 °C) (04/15/25 0512)  Pulse: 80 (04/15/25 0512)  Resp: 18 (04/15/25 0015)  BP: 118/69 (04/15/25 0512)  SpO2: (!) 92 % (04/15/25 0512) Vital Signs (24h Range):  Temp:  [96.8 °F (36 °C)-99.2 °F (37.3 °C)] 98 °F (36.7 °C)  Pulse:  [78-87] 80  Resp:  [8-27] 18  SpO2:  [91 %-97 %] 92 %  BP: ()/(56-87) 118/69     Weight: 94.7 kg (208 lb 12.4 oz)  Body mass index is 29.96 kg/m².    SpO2: (!) 92 %         Intake/Output Summary (Last 24 hours) at 4/15/2025 0810  Last data filed at 4/15/2025 0500  Gross per 24 hour   Intake 1418.2 ml   Output 700 ml   Net 718.2 ml       Lines/Drains/Airways        Peripheral Intravenous Line  Duration                  Peripheral IV - Single Lumen 04/14/25 0708 20 G Anterior;Distal;Right Upper Arm 1 day         Peripheral IV - Single Lumen 04/14/25 0708 22 G Anterior;Left;Proximal Forearm 1 day                    Significant Labs:   Chemistries:   Recent Labs   Lab 04/15/25  0409      K 4.1      CO2 27   BUN 14.5   CREATININE 0.74   CALCIUM 7.4*   GLUCOSE 124*        CBC/Anemia Labs: Coags:    Recent Labs   Lab 04/15/25  0409   WBC 11.17   HGB 12.7*   HCT 39.6*      .3*   RDW 15.7    Recent Labs   Lab 04/14/25  0800   INR 1.1          Telemetry:  Paced    Physical Exam:  Physical Exam  Constitutional:       Appearance: Normal appearance.   HENT:      Head: Normocephalic.   Eyes:      Extraocular Movements: Extraocular movements intact.      Conjunctiva/sclera: Conjunctivae normal.   Cardiovascular:      Rate and Rhythm: Normal rate and regular rhythm.      Pulses: Normal pulses.      Heart sounds: Normal heart sounds.      Comments: paced  Pulmonary:      Effort: Pulmonary effort is normal.      Breath sounds: Normal breath sounds.   Abdominal:      Palpations: Abdomen is soft.   Musculoskeletal:         General: Normal range of motion.      Cervical back: Neck supple.   Skin:     General: Skin is warm and dry.      Comments: B Groin Soft/Flat, Non-Tender, No Sign of Bleed/Infection. +2 BLE Palpable Pedal Pulses     Neurological:      Mental Status: He is alert and oriented to person, place, and time. Mental status is at baseline.   Psychiatric:         Mood and Affect: Mood normal.         Behavior: Behavior normal.         Current Inpatient Medications:  Current Medications[1]        Assessment:   IMPRESSION:  PAF/AFL S/P ablation and Watchman implantation  CAD  HLD  NICMO/CRT-D ([facility])  Chronic systolic HF  Bladder cancer  Hypothyroidism  COPD    Plan:   PLAN:  DC Home today  Bilateral groin precautions/activity restrictions  Continue  Xarelto  Carafate 1gm QID ACHS x 1 week and Protonix 40mg daily x 1 month    DISCHARGE PLAN:  Discharge: Home  Discharge Diet: Cardiac, Low Sodium  Discharge Condition: Stable  Discharge Activity:  As Tolerated, No Heavy Lifting > 5 lbs., Notify MD with Symptoms of Bleed/Infection  Discharge Time: 36 minutes    Discharge Medications:     Medication List        START taking these medications      doxycycline 100 MG tablet  Commonly known as: VIBRA-TABS  Take 1 tablet (100 mg total) by mouth every 12 (twelve) hours. for 3 days     pantoprazole 40 MG tablet  Commonly known as: PROTONIX  Take 1 tablet (40 mg total) by mouth once daily.     sucralfate 1 gram tablet  Commonly known as: CARAFATE  Take 1 tablet (1 g total) by mouth 4 (four) times daily. for 7 days            CONTINUE taking these medications      albuterol 90 mcg/actuation inhaler  Commonly known as: VENTOLIN HFA  Inhale 2 puffs into the lungs every 6 (six) hours as needed for Wheezing. Rescue     alfuzosin 10 mg Tb24  Commonly known as: UROXATRAL     amiodarone 200 MG Tab  Commonly known as: PACERONE     atorvastatin 40 MG tablet  Commonly known as: LIPITOR     CENTRUM SILVER --300 mcg Tab  Generic drug: mv-min-folic-K1-lycopen-lutein     ENTRESTO 24-26 mg per tablet  Generic drug: sacubitriL-valsartan     furosemide 20 MG tablet  Commonly known as: LASIX     levalbuterol 45 mcg/actuation inhaler  Commonly known as: XOPENEX HFA  See Instructions, INHALE 2 PUFFS BY MOUTH EVERY FOUR (4) HOURS AS NEEDED FOR WHEEZING, # 15 cap(s), 5 Refill(s), Pharmacy: [facility], 167, cm, Height/Length Dosing, 02/14/21 14:28:00 CST, 74, kg, Weight Dosing, 02/14/21 14:28:00 CST     levothyroxine 50 MCG tablet  Commonly known as: SYNTHROID     loratadine 10 mg tablet  Commonly known as: CLARITIN     nitroGLYCERIN 0.4 MG SL tablet  Commonly known as: NITROSTAT     olopatadine 0.1 % ophthalmic solution  Commonly known as: PATANOL     omeprazole 20 MG  tablet  Commonly known as: PRILOSEC OTC     tadalafiL 5 MG tablet  Commonly known as: CIALIS     TRELEGY ELLIPTA 100-62.5-25 mcg Dsdv  Generic drug: fluticasone-umeclidin-vilanter  Inhale 1 puff into the lungs once daily.     XARELTO 20 mg Tab  Generic drug: rivaroxaban            STOP taking these medications      azithromycin 250 MG tablet  Commonly known as: Z-ADIS     digoxin 125 mcg tablet  Commonly known as: LANOXIN     LOVENOX SUBQ               Where to Get Your Medications        These medications were sent to [facility] - [place], [place] - [address]  [address], [facility] [address]      Phone: [phone]   doxycycline 100 MG tablet  pantoprazole 40 MG tablet  sucralfate 1 gram tablet               [facility]  Cardiology  [facility] - 6th Floor Medical Telemetry  04/15/2025         [1]   Current Facility-Administered Medications:     acetaminophen tablet 650 mg, 650 mg, Oral, Q4H PRN, Jono Rutherford MD, 650 mg at 04/15/25 0518    aluminum-magnesium hydroxide-simethicone 200-200-20 mg/5 mL suspension 30 mL, 30 mL, Oral, Q4H PRN, Roopa Salinas NP, 30 mL at 04/15/25 0518    doxycycline tablet 100 mg, 100 mg, Oral, Q12H, Jono Rutherford MD    hydrALAZINE injection 10 mg, 10 mg, Intravenous, Q4H PRN, Jono Rutherford MD    HYDROcodone-acetaminophen 5-325 mg per tablet 1 tablet, 1 tablet, Oral, Q4H PRN, Jono Rutherford MD, 1 tablet at 04/15/25 0015    morphine injection 2 mg, 2 mg, Intravenous, Q4H PRN, Jono Rutherford MD    rivaroxaban tablet 20 mg, 20 mg, Oral, Daily, Jono Rutherford MD    Facility-Administered Medications Ordered in Other Encounters:     0.9%  NaCl infusion, , Intravenous, Once, Jono Rutehrford MD    sodium chloride 0.9% flush 10 mL, 10 mL, Intravenous, PRN, Jono Rutherford MD

## 2025-04-15 NOTE — PLAN OF CARE
04/15/25 1408   Final Note   Assessment Type Final Discharge Note   Anticipated Discharge Disposition Home   Post-Acute Status   Post-Acute Authorization [facility]

## 2025-04-16 ENCOUNTER — PATIENT OUTREACH (OUTPATIENT)
Dept: ADMINISTRATIVE | Facility: CLINIC | Age: 80
End: 2025-04-16
Payer: MEDICARE

## 2025-04-16 NOTE — PROGRESS NOTES
C3 nurse spoke with Babak Scales for a TCC post hospital discharge follow up call. The patient has a scheduled [facility] [place] [facility] appointment with Guillermo Brown Jr., MD (Family Medicine) on 4/23/2025; @3:45pm.

## 2025-04-17 ENCOUNTER — TELEPHONE (OUTPATIENT)
Dept: ADMINISTRATIVE | Facility: CLINIC | Age: 80
End: 2025-04-17
Payer: MEDICARE

## 2025-04-17 ENCOUNTER — NURSE TRIAGE (OUTPATIENT)
Dept: ADMINISTRATIVE | Facility: CLINIC | Age: 80
End: 2025-04-17
Payer: MEDICARE

## 2025-04-17 NOTE — TELEPHONE ENCOUNTER
OOC RN  Patient had PM and defibrillator.   Watchman s/p 4/13 Left side looks good but right sided incision is approx and not bleeding but swollen.Patient is concerned about swelling.      Care advise call transferred to surgeon now.  If any new or worsening symptoms,  bleeding swellin bleeding.  . To call back [facility] RN number given   and patient.   Reason for Disposition   Caller has URGENT question and triager unable to answer question    Additional Information   Negative: Sounds like a life-threatening emergency to the triager   Negative: Bright red, wide-spread, sunburn-like rash   Negative: SEVERE headache (e.g., excruciating) and after spinal (epidural) anesthesia   Negative: Vomiting and persists > 4 hours   Negative: Vomiting and abdomen looks much more swollen than usual   Negative: Drinking very little and dehydration suspected (e.g., no urine > 12 hours, very dry mouth, very lightheaded)   Negative: Patient sounds very sick or weak to the triager   Negative: Sounds like a serious complication to the triager   Negative: Fever > 100.4 F (38.0 C)    Protocols used: Post-Op Symptoms and Sqprivhsp-P-BA

## 2025-05-22 ENCOUNTER — HOSPITAL ENCOUNTER (OUTPATIENT)
Dept: CARDIOLOGY | Facility: HOSPITAL | Age: 80
Discharge: HOME | End: 2025-05-22
Attending: INTERNAL MEDICINE
Payer: MEDICARE

## 2025-05-22 ENCOUNTER — ANESTHESIA (OUTPATIENT)
Dept: CARDIOLOGY | Facility: HOSPITAL | Age: 80
End: 2025-05-22
Payer: MEDICARE

## 2025-05-22 ENCOUNTER — ANESTHESIA EVENT (OUTPATIENT)
Dept: CARDIOLOGY | Facility: HOSPITAL | Age: 80
End: 2025-05-22
Payer: MEDICARE

## 2025-05-22 VITALS
TEMPERATURE: 98 F | DIASTOLIC BLOOD PRESSURE: 64 MMHG | SYSTOLIC BLOOD PRESSURE: 123 MMHG | WEIGHT: 201.75 LBS | BODY MASS INDEX: 29.88 KG/M2 | HEIGHT: 69 IN | HEART RATE: 101 BPM | OXYGEN SATURATION: 87 %

## 2025-05-22 DIAGNOSIS — I48.0 PAROXYSMAL ATRIAL FIBRILLATION: ICD-10-CM

## 2025-05-22 DIAGNOSIS — Z01.810 PREOP CARDIOVASCULAR EXAM: ICD-10-CM

## 2025-05-22 DIAGNOSIS — I48.19 PERSISTENT ATRIAL FIBRILLATION: Primary | ICD-10-CM

## 2025-05-22 DIAGNOSIS — Z95.818 PRESENCE OF CARDIAC DEVICE: Primary | ICD-10-CM

## 2025-05-22 PROBLEM — I48.91 ATRIAL FIBRILLATION: Status: ACTIVE | Noted: 2025-04-14

## 2025-05-22 LAB
BSA FOR ECHO PROCEDURE: 2.11 M2
OHS QRS DURATION: 150 MS
OHS QTC CALCULATION: 528 MS

## 2025-05-22 PROCEDURE — 93320 DOPPLER ECHO COMPLETE: CPT

## 2025-05-22 PROCEDURE — 93010 ELECTROCARDIOGRAM REPORT: CPT | Mod: ,,, | Performed by: INTERNAL MEDICINE

## 2025-05-22 PROCEDURE — 37000008 HC ANESTHESIA 1ST 15 MINUTES

## 2025-05-22 PROCEDURE — 93005 ELECTROCARDIOGRAM TRACING: CPT

## 2025-05-22 PROCEDURE — 63600175 PHARM REV CODE 636 W HCPCS: Performed by: NURSE ANESTHETIST, CERTIFIED REGISTERED

## 2025-05-22 RX ORDER — PROPOFOL 10 MG/ML
VIAL (ML) INTRAVENOUS
Status: DISCONTINUED | OUTPATIENT
Start: 2025-05-22 | End: 2025-05-22

## 2025-05-22 RX ORDER — CLOPIDOGREL BISULFATE 75 MG/1
75 TABLET ORAL DAILY
Qty: 30 TABLET | Refills: 6 | Status: SHIPPED | OUTPATIENT
Start: 2025-05-22 | End: 2026-05-22

## 2025-05-22 RX ORDER — ASPIRIN 81 MG/1
81 TABLET ORAL DAILY
Qty: 360 TABLET | Refills: 0 | Status: SHIPPED | OUTPATIENT
Start: 2025-05-22 | End: 2026-05-22

## 2025-05-22 RX ADMIN — PROPOFOL 30 MG: 10 INJECTION, EMULSION INTRAVENOUS at 08:05

## 2025-05-22 RX ADMIN — PROPOFOL 100 MG: 10 INJECTION, EMULSION INTRAVENOUS at 08:05

## 2025-05-22 NOTE — DISCHARGE SUMMARY
[facility] - Cardiology Diagnostics  Discharge Note  Short Stay    Transesophageal echo (KIRBY)      OUTCOME: Patient tolerated treatment/procedure well without complication and is now ready for discharge.    DISPOSITION: Home or Self Care    FINAL DIAGNOSIS:  Atrial fibrillation    FOLLOWUP: In clinic    DISCHARGE INSTRUCTIONS:  No discharge procedures on file.     TIME SPENT ON DISCHARGE: 15 minutes

## 2025-05-22 NOTE — DISCHARGE INSTRUCTIONS
.. AT Home  You may feel soreness in your throat. This will go away in 1 to 2 days.  In the meantime, drink plenty of water and use cough drops to soothe your throat.    You may resume your normal activities 24 hours after your procedure.   Don't drink alcoholic drinks for 24 hours after your procedure.   Don't smoke for 24 hours after your procedure.   You may have a little blood in the phlegm (mucus) or saliva that you cough up.  If this happens for more than 24 hours or if there is a lot of blood, call your healthcare provider.     When to Call Your Healthcare Provider  A fever of 101 degrees or higher  Blood in your phlegm or saliva for more than 24 hours after your procedure.  A lot of blood in your phlegm or saliva, such as when you cough.

## 2025-05-22 NOTE — ANESTHESIA POSTPROCEDURE EVALUATION
Anesthesia Post Evaluation    Patient: Babak Scales    Procedure(s) Performed: * No procedures listed *    Final Anesthesia Type: general      Patient location during evaluation: PACU  Patient participation: Yes- Able to Participate  Level of consciousness: awake and alert  Post-procedure vital signs: reviewed and stable  Pain management: adequate  Airway patency: patent    PONV status at discharge: No PONV  Anesthetic complications: no      Cardiovascular status: hemodynamically stable  Respiratory status: spontaneous ventilation and room air  Hydration status: euvolemic  Follow-up not needed.              Vitals Value Taken Time   /76 05/22/25 09:17   Temp  05/22/25 10:24   Pulse 84 05/22/25 09:17   Resp  05/22/25 10:24   SpO2 93 % 05/22/25 09:17   Vitals shown include unfiled device data.      No case tracking events are documented in the log.      Pain/Tanesha Score: No data recorded

## 2025-05-22 NOTE — TRANSFER OF CARE
"Anesthesia Transfer of Care Note    Patient: Babak Scales    Procedure(s) Performed: * No procedures listed *    Patient location: PACU    Anesthesia Type: general    Transport from OR: Transported from OR on room air with adequate spontaneous ventilation    Post pain: adequate analgesia    Post assessment: no apparent anesthetic complications and tolerated procedure well    Post vital signs: stable    Level of consciousness: sedated and responds to stimulation    Nausea/Vomiting: no nausea/vomiting    Complications: none    Transfer of care protocol was followed    Last vitals: Visit Vitals  /76   Pulse 79   Temp 36.6 °C (97.9 °F) (Oral)   Ht 5' 9" (1.753 m)   Wt 91.5 kg (201 lb 11.5 oz)   SpO2 (!) 93%   BMI 29.79 kg/m²     "

## 2025-05-22 NOTE — ANESTHESIA PREPROCEDURE EVALUATION
05/22/2025  Babak Scales is a 79 y.o., male.  Cardioversion  Bladder CA  Nonischemic cardiomyopathy  AICD  A-fib  Atrial flutter ablation  Watchman  Hyperlipidemia  CAD  COPD  Hypothyroid  KIRBY: Summary  Show Result Comparison     Left Ventricle: The left ventricle is dilated. Normal wall thickness. Moderate global hypokinesis present. There is moderately reduced systolic function with a visually estimated ejection fraction of 35 - 40%.    Right Ventricle: Right ventricular enlargement. Systolic function is normal. TAPSE is 2.82 cm. Pacemaker lead present in the ventricle.    Left Atrium: dilated    Right Atrium: Right atrium is dilated.    Mitral Valve: There is mild regurgitation.    Tricuspid Valve: There is mild to moderate regurgitation.    IVC/SVC: Intermediate venous pressure at 8 mmHg.      Pre-op Assessment    I have reviewed the Patient Summary Reports.     I have reviewed the Nursing Notes. I have reviewed the NPO Status.   I have reviewed the Medications.     Review of Systems  Anesthesia Hx:  No problems with previous Anesthesia                Social:  Former Smoker       Hematology/Oncology:                        --  Cancer in past history:                     Cardiovascular:    Pacemaker     Dysrhythmias   CHF    hyperlipidemia   ECG has been reviewed.                            Pulmonary:   COPD Asthma  Shortness of breath                  Renal/:   renal calculi BPH              Hepatic/GI:     GERD                Musculoskeletal:  Arthritis               Neurological:   CVA                                    Endocrine:        Obesity / BMI > 30      Physical Exam  General: Cooperative, Alert, Oriented and Well nourished    Airway:  Mallampati: II   Mouth Opening: Normal  TM Distance: Normal  Tongue: Normal  Neck ROM: Normal ROM    Dental:  Intact    Chest/Lungs:  Clear to auscultation,  Normal Respiratory Rate    Heart:  Rate: Normal  Rhythm: Regular Rhythm        Anesthesia Plan  Type of Anesthesia, risks & benefits discussed:    Anesthesia Type: Gen Natural Airway  Intra-op Monitoring Plan: Standard ASA Monitors  Induction:  IV  Informed Consent: Informed consent signed with the Patient and all parties understand the risks and agree with anesthesia plan.  All questions answered.   ASA Score: 3  Day of Surgery Review of History & Physical: H&P Update referred to the surgeon/provider.    Ready For Surgery From Anesthesia Perspective.     .

## 2025-05-30 ENCOUNTER — HOSPITAL ENCOUNTER (OUTPATIENT)
Dept: RADIOLOGY | Facility: HOSPITAL | Age: 80
Discharge: HOME | End: 2025-05-30
Attending: FAMILY MEDICINE
Payer: MEDICARE

## 2025-05-30 DIAGNOSIS — M79.662 PAIN OF LEFT CALF: ICD-10-CM

## 2025-05-30 PROCEDURE — 93971 EXTREMITY STUDY: CPT | Mod: TC,LT

## 2025-06-02 ENCOUNTER — HOSPITAL ENCOUNTER (EMERGENCY)
Facility: HOSPITAL | Age: 80
Discharge: HOME | End: 2025-06-02
Attending: FAMILY MEDICINE
Payer: MEDICARE

## 2025-06-02 VITALS
RESPIRATION RATE: 20 BRPM | WEIGHT: 200 LBS | TEMPERATURE: 100 F | BODY MASS INDEX: 28.63 KG/M2 | OXYGEN SATURATION: 93 % | SYSTOLIC BLOOD PRESSURE: 100 MMHG | HEIGHT: 70 IN | HEART RATE: 87 BPM | DIASTOLIC BLOOD PRESSURE: 65 MMHG

## 2025-06-02 DIAGNOSIS — D72.829 LEUKOCYTOSIS, UNSPECIFIED TYPE: ICD-10-CM

## 2025-06-02 DIAGNOSIS — J44.1 COPD EXACERBATION: Primary | ICD-10-CM

## 2025-06-02 DIAGNOSIS — R06.02 SOB (SHORTNESS OF BREATH): ICD-10-CM

## 2025-06-02 LAB
ALBUMIN SERPL-MCNC: 3.8 G/DL (ref 3.4–4.8)
ALBUMIN/GLOB SERPL: 1.2 RATIO (ref 1.1–2)
ALP SERPL-CCNC: 80 UNIT/L (ref 40–150)
ALT SERPL-CCNC: 11 UNIT/L (ref 0–55)
ANION GAP SERPL CALC-SCNC: 9 MEQ/L
AST SERPL-CCNC: 17 UNIT/L (ref 11–45)
BASOPHILS # BLD AUTO: 0.04 X10(3)/MCL
BASOPHILS NFR BLD AUTO: 0.3 %
BILIRUB SERPL-MCNC: 1.6 MG/DL
BNP BLD-MCNC: 61.4 PG/ML
BUN SERPL-MCNC: 16.6 MG/DL (ref 8.4–25.7)
CALCIUM SERPL-MCNC: 8.9 MG/DL (ref 8.8–10)
CHLORIDE SERPL-SCNC: 105 MMOL/L (ref 98–107)
CO2 SERPL-SCNC: 26 MMOL/L (ref 23–31)
CREAT SERPL-MCNC: 0.67 MG/DL (ref 0.72–1.25)
CREAT/UREA NIT SERPL: 25
EOSINOPHIL # BLD AUTO: 0.11 X10(3)/MCL (ref 0–0.9)
EOSINOPHIL NFR BLD AUTO: 0.7 %
ERYTHROCYTE [DISTWIDTH] IN BLOOD BY AUTOMATED COUNT: 14.4 % (ref 11.5–17)
FLUAV AG UPPER RESP QL IA.RAPID: NOT DETECTED
FLUBV AG UPPER RESP QL IA.RAPID: NOT DETECTED
GFR SERPLBLD CREATININE-BSD FMLA CKD-EPI: >60 ML/MIN/1.73/M2
GLOBULIN SER-MCNC: 3.3 GM/DL (ref 2.4–3.5)
GLUCOSE SERPL-MCNC: 110 MG/DL (ref 82–115)
HCT VFR BLD AUTO: 38.7 % (ref 42–52)
HGB BLD-MCNC: 12.8 G/DL (ref 14–18)
IMM GRANULOCYTES # BLD AUTO: 0.03 X10(3)/MCL (ref 0–0.04)
IMM GRANULOCYTES NFR BLD AUTO: 0.2 %
LYMPHOCYTES # BLD AUTO: 1.72 X10(3)/MCL (ref 0.6–4.6)
LYMPHOCYTES NFR BLD AUTO: 11.6 %
MCH RBC QN AUTO: 31.8 PG (ref 27–31)
MCHC RBC AUTO-ENTMCNC: 33.1 G/DL (ref 33–36)
MCV RBC AUTO: 96.3 FL (ref 80–94)
MONOCYTES # BLD AUTO: 0.95 X10(3)/MCL (ref 0.1–1.3)
MONOCYTES NFR BLD AUTO: 6.4 %
NEUTROPHILS # BLD AUTO: 12.02 X10(3)/MCL (ref 2.1–9.2)
NEUTROPHILS NFR BLD AUTO: 80.8 %
PLATELET # BLD AUTO: 205 X10(3)/MCL (ref 130–400)
PMV BLD AUTO: 8.9 FL (ref 7.4–10.4)
POTASSIUM SERPL-SCNC: 3.7 MMOL/L (ref 3.5–5.1)
PROT SERPL-MCNC: 7.1 GM/DL (ref 5.8–7.6)
RBC # BLD AUTO: 4.02 X10(6)/MCL (ref 4.7–6.1)
SARS-COV-2 RNA RESP QL NAA+PROBE: NOT DETECTED
SODIUM SERPL-SCNC: 140 MMOL/L (ref 136–145)
TROPONIN I SERPL-MCNC: 0.03 NG/ML (ref 0–0.04)
TSH SERPL-ACNC: 5.15 UIU/ML (ref 0.35–4.94)
WBC # BLD AUTO: 14.87 X10(3)/MCL (ref 4.5–11.5)

## 2025-06-02 PROCEDURE — 94640 AIRWAY INHALATION TREATMENT: CPT | Mod: XB

## 2025-06-02 PROCEDURE — 87040 BLOOD CULTURE FOR BACTERIA: CPT | Performed by: FAMILY MEDICINE

## 2025-06-02 PROCEDURE — 96367 TX/PROPH/DG ADDL SEQ IV INF: CPT

## 2025-06-02 PROCEDURE — 96365 THER/PROPH/DIAG IV INF INIT: CPT

## 2025-06-02 PROCEDURE — 63600175 PHARM REV CODE 636 W HCPCS: Mod: JZ,TB | Performed by: FAMILY MEDICINE

## 2025-06-02 PROCEDURE — 96366 THER/PROPH/DIAG IV INF ADDON: CPT

## 2025-06-02 PROCEDURE — 96375 TX/PRO/DX INJ NEW DRUG ADDON: CPT

## 2025-06-02 PROCEDURE — 25000003 PHARM REV CODE 250: Performed by: FAMILY MEDICINE

## 2025-06-02 PROCEDURE — 0240U COVID/FLU A&B PCR: CPT | Performed by: FAMILY MEDICINE

## 2025-06-02 PROCEDURE — 84484 ASSAY OF TROPONIN QUANT: CPT | Performed by: FAMILY MEDICINE

## 2025-06-02 PROCEDURE — 25000242 PHARM REV CODE 250 ALT 637 W/ HCPCS: Performed by: FAMILY MEDICINE

## 2025-06-02 PROCEDURE — 80053 COMPREHEN METABOLIC PANEL: CPT | Performed by: FAMILY MEDICINE

## 2025-06-02 PROCEDURE — 83880 ASSAY OF NATRIURETIC PEPTIDE: CPT | Performed by: FAMILY MEDICINE

## 2025-06-02 PROCEDURE — 99285 EMERGENCY DEPT VISIT HI MDM: CPT | Mod: 25

## 2025-06-02 PROCEDURE — 84443 ASSAY THYROID STIM HORMONE: CPT | Performed by: FAMILY MEDICINE

## 2025-06-02 PROCEDURE — 85025 COMPLETE CBC W/AUTO DIFF WBC: CPT | Performed by: FAMILY MEDICINE

## 2025-06-02 RX ORDER — MAGNESIUM SULFATE HEPTAHYDRATE 40 MG/ML
2 INJECTION, SOLUTION INTRAVENOUS ONCE
Status: COMPLETED | OUTPATIENT
Start: 2025-06-02 | End: 2025-06-02

## 2025-06-02 RX ORDER — IPRATROPIUM BROMIDE 0.5 MG/2.5ML
0.5 SOLUTION RESPIRATORY (INHALATION) ONCE
Status: COMPLETED | OUTPATIENT
Start: 2025-06-02 | End: 2025-06-02

## 2025-06-02 RX ORDER — AZITHROMYCIN 250 MG/1
250 TABLET, FILM COATED ORAL DAILY
Qty: 6 TABLET | Refills: 0 | Status: SHIPPED | OUTPATIENT
Start: 2025-06-02 | End: 2025-06-02

## 2025-06-02 RX ORDER — AZITHROMYCIN 250 MG/1
250 TABLET, FILM COATED ORAL DAILY
Qty: 6 TABLET | Refills: 0 | Status: ON HOLD | OUTPATIENT
Start: 2025-06-02

## 2025-06-02 RX ORDER — PREDNISONE 20 MG/1
40 TABLET ORAL DAILY
Qty: 10 TABLET | Refills: 0 | Status: ON HOLD | OUTPATIENT
Start: 2025-06-02 | End: 2025-06-07

## 2025-06-02 RX ORDER — METHYLPREDNISOLONE SOD SUCC 125 MG
125 VIAL (EA) INJECTION
Status: COMPLETED | OUTPATIENT
Start: 2025-06-02 | End: 2025-06-02

## 2025-06-02 RX ORDER — IPRATROPIUM BROMIDE 0.5 MG/2.5ML
0.5 SOLUTION RESPIRATORY (INHALATION) ONCE
Status: DISCONTINUED | OUTPATIENT
Start: 2025-06-02 | End: 2025-06-02

## 2025-06-02 RX ADMIN — IPRATROPIUM BROMIDE 0.5 MG: 0.5 SOLUTION RESPIRATORY (INHALATION) at 03:06

## 2025-06-02 RX ADMIN — IPRATROPIUM BROMIDE 0.5 MG: 0.5 SOLUTION RESPIRATORY (INHALATION) at 01:06

## 2025-06-02 RX ADMIN — MAGNESIUM SULFATE HEPTAHYDRATE 2 G: 40 INJECTION, SOLUTION INTRAVENOUS at 02:06

## 2025-06-02 RX ADMIN — AZITHROMYCIN MONOHYDRATE 500 MG: 500 INJECTION, POWDER, LYOPHILIZED, FOR SOLUTION INTRAVENOUS at 03:06

## 2025-06-02 RX ADMIN — METHYLPREDNISOLONE SODIUM SUCCINATE 125 MG: 125 INJECTION, POWDER, FOR SOLUTION INTRAMUSCULAR; INTRAVENOUS at 01:06

## 2025-06-03 ENCOUNTER — HOSPITAL ENCOUNTER (INPATIENT)
Facility: HOSPITAL | Age: 80
LOS: 3 days | Discharge: NURSING, REHAB OR LONG-TERM CARE | DRG: 191 | End: 2025-06-07
Attending: EMERGENCY MEDICINE | Admitting: INTERNAL MEDICINE
Payer: MEDICARE

## 2025-06-03 DIAGNOSIS — R07.9 CHEST PAIN: ICD-10-CM

## 2025-06-03 DIAGNOSIS — R06.02 SOB (SHORTNESS OF BREATH): ICD-10-CM

## 2025-06-03 DIAGNOSIS — J44.1 COPD EXACERBATION: Primary | ICD-10-CM

## 2025-06-03 DIAGNOSIS — I48.91 A-FIB: ICD-10-CM

## 2025-06-03 LAB
ALBUMIN SERPL-MCNC: 3.6 G/DL (ref 3.4–4.8)
ALBUMIN/GLOB SERPL: 0.9 RATIO (ref 1.1–2)
ALP SERPL-CCNC: 69 UNIT/L (ref 40–150)
ALT SERPL-CCNC: 13 UNIT/L (ref 0–55)
ANION GAP SERPL CALC-SCNC: 9 MEQ/L
AST SERPL-CCNC: 24 UNIT/L (ref 11–45)
BASOPHILS # BLD AUTO: 0.01 X10(3)/MCL
BASOPHILS NFR BLD AUTO: 0.1 %
BILIRUB SERPL-MCNC: 1.1 MG/DL
BNP BLD-MCNC: 76.9 PG/ML
BUN SERPL-MCNC: 19.7 MG/DL (ref 8.4–25.7)
CALCIUM SERPL-MCNC: 9 MG/DL (ref 8.8–10)
CHLORIDE SERPL-SCNC: 104 MMOL/L (ref 98–107)
CO2 SERPL-SCNC: 27 MMOL/L (ref 23–31)
CREAT SERPL-MCNC: 0.7 MG/DL (ref 0.72–1.25)
CREAT/UREA NIT SERPL: 28
D DIMER PPP IA.FEU-MCNC: 0.4 UG/ML FEU (ref 0–0.5)
EOSINOPHIL # BLD AUTO: 0 X10(3)/MCL (ref 0–0.9)
EOSINOPHIL NFR BLD AUTO: 0 %
ERYTHROCYTE [DISTWIDTH] IN BLOOD BY AUTOMATED COUNT: 14.7 % (ref 11.5–17)
FLUAV AG UPPER RESP QL IA.RAPID: NOT DETECTED
FLUBV AG UPPER RESP QL IA.RAPID: NOT DETECTED
GFR SERPLBLD CREATININE-BSD FMLA CKD-EPI: >60 ML/MIN/1.73/M2
GLOBULIN SER-MCNC: 4 GM/DL (ref 2.4–3.5)
GLUCOSE SERPL-MCNC: 108 MG/DL (ref 82–115)
HCT VFR BLD AUTO: 38.9 % (ref 42–52)
HGB BLD-MCNC: 12.9 G/DL (ref 14–18)
IMM GRANULOCYTES # BLD AUTO: 0.05 X10(3)/MCL (ref 0–0.04)
IMM GRANULOCYTES NFR BLD AUTO: 0.3 %
LYMPHOCYTES # BLD AUTO: 1.07 X10(3)/MCL (ref 0.6–4.6)
LYMPHOCYTES NFR BLD AUTO: 5.5 %
MCH RBC QN AUTO: 31.9 PG (ref 27–31)
MCHC RBC AUTO-ENTMCNC: 33.2 G/DL (ref 33–36)
MCV RBC AUTO: 96 FL (ref 80–94)
MONOCYTES # BLD AUTO: 1.24 X10(3)/MCL (ref 0.1–1.3)
MONOCYTES NFR BLD AUTO: 6.3 %
NEUTROPHILS # BLD AUTO: 17.24 X10(3)/MCL (ref 2.1–9.2)
NEUTROPHILS NFR BLD AUTO: 87.8 %
OHS QRS DURATION: 170 MS
OHS QTC CALCULATION: 589 MS
PLATELET # BLD AUTO: 205 X10(3)/MCL (ref 130–400)
PMV BLD AUTO: 8.6 FL (ref 7.4–10.4)
POCT GLUCOSE: 169 MG/DL (ref 70–110)
POCT GLUCOSE: 182 MG/DL (ref 70–110)
POTASSIUM SERPL-SCNC: 3.9 MMOL/L (ref 3.5–5.1)
PROT SERPL-MCNC: 7.6 GM/DL (ref 5.8–7.6)
RBC # BLD AUTO: 4.05 X10(6)/MCL (ref 4.7–6.1)
SARS-COV-2 RNA RESP QL NAA+PROBE: NOT DETECTED
SODIUM SERPL-SCNC: 140 MMOL/L (ref 136–145)
TROPONIN I SERPL-MCNC: 0.03 NG/ML (ref 0–0.04)
WBC # BLD AUTO: 19.61 X10(3)/MCL (ref 4.5–11.5)

## 2025-06-03 PROCEDURE — 96372 THER/PROPH/DIAG INJ SC/IM: CPT | Performed by: PHYSICIAN ASSISTANT

## 2025-06-03 PROCEDURE — 94799 UNLISTED PULMONARY SVC/PX: CPT

## 2025-06-03 PROCEDURE — 99900035 HC TECH TIME PER 15 MIN (STAT)

## 2025-06-03 PROCEDURE — 63600175 PHARM REV CODE 636 W HCPCS: Mod: JZ,TB | Performed by: PHYSICIAN ASSISTANT

## 2025-06-03 PROCEDURE — 83880 ASSAY OF NATRIURETIC PEPTIDE: CPT | Performed by: EMERGENCY MEDICINE

## 2025-06-03 PROCEDURE — 94640 AIRWAY INHALATION TREATMENT: CPT | Mod: XB

## 2025-06-03 PROCEDURE — G0378 HOSPITAL OBSERVATION PER HR: HCPCS

## 2025-06-03 PROCEDURE — 25000003 PHARM REV CODE 250: Performed by: INTERNAL MEDICINE

## 2025-06-03 PROCEDURE — 0240U COVID/FLU A&B PCR: CPT | Performed by: EMERGENCY MEDICINE

## 2025-06-03 PROCEDURE — 96374 THER/PROPH/DIAG INJ IV PUSH: CPT

## 2025-06-03 PROCEDURE — 63600175 PHARM REV CODE 636 W HCPCS: Mod: JZ,TB | Performed by: EMERGENCY MEDICINE

## 2025-06-03 PROCEDURE — 85025 COMPLETE CBC W/AUTO DIFF WBC: CPT | Performed by: EMERGENCY MEDICINE

## 2025-06-03 PROCEDURE — 93005 ELECTROCARDIOGRAM TRACING: CPT

## 2025-06-03 PROCEDURE — 84484 ASSAY OF TROPONIN QUANT: CPT | Performed by: EMERGENCY MEDICINE

## 2025-06-03 PROCEDURE — 85379 FIBRIN DEGRADATION QUANT: CPT | Performed by: PHYSICIAN ASSISTANT

## 2025-06-03 PROCEDURE — 99285 EMERGENCY DEPT VISIT HI MDM: CPT | Mod: 25

## 2025-06-03 PROCEDURE — 80053 COMPREHEN METABOLIC PANEL: CPT | Performed by: EMERGENCY MEDICINE

## 2025-06-03 PROCEDURE — 93010 ELECTROCARDIOGRAM REPORT: CPT | Mod: ,,, | Performed by: INTERNAL MEDICINE

## 2025-06-03 PROCEDURE — 25000242 PHARM REV CODE 250 ALT 637 W/ HCPCS: Performed by: PHYSICIAN ASSISTANT

## 2025-06-03 PROCEDURE — 25000003 PHARM REV CODE 250: Performed by: PHYSICIAN ASSISTANT

## 2025-06-03 PROCEDURE — 25000242 PHARM REV CODE 250 ALT 637 W/ HCPCS: Performed by: EMERGENCY MEDICINE

## 2025-06-03 PROCEDURE — 87070 CULTURE OTHR SPECIMN AEROBIC: CPT | Performed by: PHYSICIAN ASSISTANT

## 2025-06-03 PROCEDURE — 94640 AIRWAY INHALATION TREATMENT: CPT

## 2025-06-03 RX ORDER — ACETAMINOPHEN 325 MG/1
650 TABLET ORAL EVERY 4 HOURS PRN
Status: DISCONTINUED | OUTPATIENT
Start: 2025-06-03 | End: 2025-06-07 | Stop reason: HOSPADM

## 2025-06-03 RX ORDER — PANTOPRAZOLE SODIUM 40 MG/1
40 TABLET, DELAYED RELEASE ORAL DAILY
Status: DISCONTINUED | OUTPATIENT
Start: 2025-06-03 | End: 2025-06-07 | Stop reason: HOSPADM

## 2025-06-03 RX ORDER — BUDESONIDE 0.5 MG/2ML
0.5 INHALANT ORAL EVERY 12 HOURS
Status: DISCONTINUED | OUTPATIENT
Start: 2025-06-03 | End: 2025-06-07 | Stop reason: HOSPADM

## 2025-06-03 RX ORDER — ASPIRIN 81 MG/1
81 TABLET ORAL DAILY
Status: DISCONTINUED | OUTPATIENT
Start: 2025-06-03 | End: 2025-06-07 | Stop reason: HOSPADM

## 2025-06-03 RX ORDER — ATORVASTATIN CALCIUM 40 MG/1
40 TABLET, FILM COATED ORAL NIGHTLY
Status: DISCONTINUED | OUTPATIENT
Start: 2025-06-03 | End: 2025-06-07 | Stop reason: HOSPADM

## 2025-06-03 RX ORDER — INSULIN ASPART 100 [IU]/ML
0-10 INJECTION, SOLUTION INTRAVENOUS; SUBCUTANEOUS
Status: DISCONTINUED | OUTPATIENT
Start: 2025-06-03 | End: 2025-06-07 | Stop reason: HOSPADM

## 2025-06-03 RX ORDER — ONDANSETRON 4 MG/1
8 TABLET, ORALLY DISINTEGRATING ORAL EVERY 8 HOURS PRN
Status: DISCONTINUED | OUTPATIENT
Start: 2025-06-03 | End: 2025-06-07 | Stop reason: HOSPADM

## 2025-06-03 RX ORDER — TAMSULOSIN HYDROCHLORIDE 0.4 MG/1
0.4 CAPSULE ORAL DAILY
COMMUNITY

## 2025-06-03 RX ORDER — TAMSULOSIN HYDROCHLORIDE 0.4 MG/1
0.4 CAPSULE ORAL DAILY
Status: DISCONTINUED | OUTPATIENT
Start: 2025-06-03 | End: 2025-06-07 | Stop reason: HOSPADM

## 2025-06-03 RX ORDER — NALOXONE HCL 0.4 MG/ML
0.02 VIAL (ML) INJECTION
Status: DISCONTINUED | OUTPATIENT
Start: 2025-06-03 | End: 2025-06-07 | Stop reason: HOSPADM

## 2025-06-03 RX ORDER — SELENIUM 50 MCG
2 TABLET ORAL
Status: DISCONTINUED | OUTPATIENT
Start: 2025-06-03 | End: 2025-06-07 | Stop reason: HOSPADM

## 2025-06-03 RX ORDER — POLYETHYLENE GLYCOL 3350 17 G/17G
17 POWDER, FOR SOLUTION ORAL 3 TIMES DAILY PRN
Status: DISCONTINUED | OUTPATIENT
Start: 2025-06-03 | End: 2025-06-07 | Stop reason: HOSPADM

## 2025-06-03 RX ORDER — IPRATROPIUM BROMIDE AND ALBUTEROL SULFATE 2.5; .5 MG/3ML; MG/3ML
3 SOLUTION RESPIRATORY (INHALATION) EVERY 6 HOURS PRN
Status: DISCONTINUED | OUTPATIENT
Start: 2025-06-03 | End: 2025-06-07 | Stop reason: HOSPADM

## 2025-06-03 RX ORDER — CLOPIDOGREL BISULFATE 75 MG/1
75 TABLET ORAL DAILY
Status: DISCONTINUED | OUTPATIENT
Start: 2025-06-03 | End: 2025-06-07 | Stop reason: HOSPADM

## 2025-06-03 RX ORDER — IBUPROFEN 200 MG
24 TABLET ORAL
Status: DISCONTINUED | OUTPATIENT
Start: 2025-06-03 | End: 2025-06-07 | Stop reason: HOSPADM

## 2025-06-03 RX ORDER — LEVOFLOXACIN 5 MG/ML
750 INJECTION, SOLUTION INTRAVENOUS
Status: DISCONTINUED | OUTPATIENT
Start: 2025-06-03 | End: 2025-06-07

## 2025-06-03 RX ORDER — CETIRIZINE HYDROCHLORIDE 10 MG/1
10 TABLET ORAL DAILY
Status: DISCONTINUED | OUTPATIENT
Start: 2025-06-03 | End: 2025-06-07 | Stop reason: HOSPADM

## 2025-06-03 RX ORDER — FUROSEMIDE 20 MG/1
20 TABLET ORAL EVERY MORNING
Status: DISCONTINUED | OUTPATIENT
Start: 2025-06-03 | End: 2025-06-07 | Stop reason: HOSPADM

## 2025-06-03 RX ORDER — SODIUM CHLORIDE 0.9 % (FLUSH) 0.9 %
10 SYRINGE (ML) INJECTION
Status: DISCONTINUED | OUTPATIENT
Start: 2025-06-03 | End: 2025-06-07 | Stop reason: HOSPADM

## 2025-06-03 RX ORDER — MORPHINE SULFATE 4 MG/ML
2 INJECTION, SOLUTION INTRAMUSCULAR; INTRAVENOUS EVERY 6 HOURS PRN
Refills: 0 | Status: DISCONTINUED | OUTPATIENT
Start: 2025-06-03 | End: 2025-06-07 | Stop reason: HOSPADM

## 2025-06-03 RX ORDER — METHYLPREDNISOLONE SOD SUCC 125 MG
60 VIAL (EA) INJECTION EVERY 6 HOURS
Status: DISCONTINUED | OUTPATIENT
Start: 2025-06-03 | End: 2025-06-05

## 2025-06-03 RX ORDER — GLUCAGON 1 MG
1 KIT INJECTION
Status: DISCONTINUED | OUTPATIENT
Start: 2025-06-03 | End: 2025-06-07 | Stop reason: HOSPADM

## 2025-06-03 RX ORDER — AMIODARONE HYDROCHLORIDE 200 MG/1
200 TABLET ORAL DAILY
Status: DISCONTINUED | OUTPATIENT
Start: 2025-06-03 | End: 2025-06-07 | Stop reason: HOSPADM

## 2025-06-03 RX ORDER — ALUMINUM HYDROXIDE, MAGNESIUM HYDROXIDE, AND SIMETHICONE 1200; 120; 1200 MG/30ML; MG/30ML; MG/30ML
30 SUSPENSION ORAL 4 TIMES DAILY PRN
Status: DISCONTINUED | OUTPATIENT
Start: 2025-06-03 | End: 2025-06-07 | Stop reason: HOSPADM

## 2025-06-03 RX ORDER — HYDROCODONE BITARTRATE AND ACETAMINOPHEN 5; 325 MG/1; MG/1
1 TABLET ORAL EVERY 6 HOURS PRN
Refills: 0 | Status: DISCONTINUED | OUTPATIENT
Start: 2025-06-03 | End: 2025-06-07 | Stop reason: HOSPADM

## 2025-06-03 RX ORDER — ONDANSETRON HYDROCHLORIDE 2 MG/ML
4 INJECTION, SOLUTION INTRAVENOUS EVERY 8 HOURS PRN
Status: DISCONTINUED | OUTPATIENT
Start: 2025-06-03 | End: 2025-06-07 | Stop reason: HOSPADM

## 2025-06-03 RX ORDER — BISACODYL 10 MG/1
10 SUPPOSITORY RECTAL DAILY PRN
Status: DISCONTINUED | OUTPATIENT
Start: 2025-06-03 | End: 2025-06-07 | Stop reason: HOSPADM

## 2025-06-03 RX ORDER — ENOXAPARIN SODIUM 100 MG/ML
40 INJECTION SUBCUTANEOUS EVERY 24 HOURS
Status: DISCONTINUED | OUTPATIENT
Start: 2025-06-03 | End: 2025-06-07 | Stop reason: HOSPADM

## 2025-06-03 RX ORDER — METHYLPREDNISOLONE SOD SUCC 125 MG
125 VIAL (EA) INJECTION
Status: COMPLETED | OUTPATIENT
Start: 2025-06-03 | End: 2025-06-03

## 2025-06-03 RX ORDER — IPRATROPIUM BROMIDE AND ALBUTEROL SULFATE 2.5; .5 MG/3ML; MG/3ML
3 SOLUTION RESPIRATORY (INHALATION)
Status: COMPLETED | OUTPATIENT
Start: 2025-06-03 | End: 2025-06-03

## 2025-06-03 RX ORDER — SIMETHICONE 80 MG
1 TABLET,CHEWABLE ORAL 4 TIMES DAILY PRN
Status: DISCONTINUED | OUTPATIENT
Start: 2025-06-03 | End: 2025-06-07 | Stop reason: HOSPADM

## 2025-06-03 RX ORDER — IBUPROFEN 200 MG
16 TABLET ORAL
Status: DISCONTINUED | OUTPATIENT
Start: 2025-06-03 | End: 2025-06-07 | Stop reason: HOSPADM

## 2025-06-03 RX ORDER — IPRATROPIUM BROMIDE AND ALBUTEROL SULFATE 2.5; .5 MG/3ML; MG/3ML
3 SOLUTION RESPIRATORY (INHALATION) EVERY 6 HOURS
Status: DISCONTINUED | OUTPATIENT
Start: 2025-06-03 | End: 2025-06-04

## 2025-06-03 RX ORDER — LEVOTHYROXINE SODIUM 50 UG/1
50 TABLET ORAL DAILY
Status: DISCONTINUED | OUTPATIENT
Start: 2025-06-03 | End: 2025-06-07 | Stop reason: HOSPADM

## 2025-06-03 RX ORDER — TALC
9 POWDER (GRAM) TOPICAL NIGHTLY PRN
Status: DISCONTINUED | OUTPATIENT
Start: 2025-06-03 | End: 2025-06-07 | Stop reason: HOSPADM

## 2025-06-03 RX ADMIN — THERA TABS 1 TABLET: TAB at 12:06

## 2025-06-03 RX ADMIN — Medication 2 CAPSULE: at 04:06

## 2025-06-03 RX ADMIN — SACUBITRIL AND VALSARTAN 1 TABLET: 24; 26 TABLET, FILM COATED ORAL at 08:06

## 2025-06-03 RX ADMIN — FUROSEMIDE 20 MG: 20 TABLET ORAL at 12:06

## 2025-06-03 RX ADMIN — AMIODARONE HYDROCHLORIDE 200 MG: 200 TABLET ORAL at 04:06

## 2025-06-03 RX ADMIN — INSULIN ASPART 2 UNITS: 100 INJECTION, SOLUTION INTRAVENOUS; SUBCUTANEOUS at 06:06

## 2025-06-03 RX ADMIN — IPRATROPIUM BROMIDE AND ALBUTEROL SULFATE 3 ML: 2.5; .5 SOLUTION RESPIRATORY (INHALATION) at 12:06

## 2025-06-03 RX ADMIN — CLOPIDOGREL BISULFATE 75 MG: 75 TABLET ORAL at 12:06

## 2025-06-03 RX ADMIN — LEVOFLOXACIN 750 MG: 750 INJECTION, SOLUTION INTRAVENOUS at 12:06

## 2025-06-03 RX ADMIN — INSULIN ASPART 1 UNITS: 100 INJECTION, SOLUTION INTRAVENOUS; SUBCUTANEOUS at 09:06

## 2025-06-03 RX ADMIN — IPRATROPIUM BROMIDE AND ALBUTEROL SULFATE 3 ML: .5; 3 SOLUTION RESPIRATORY (INHALATION) at 09:06

## 2025-06-03 RX ADMIN — BUDESONIDE 0.5 MG: 0.5 INHALANT RESPIRATORY (INHALATION) at 07:06

## 2025-06-03 RX ADMIN — METHYLPREDNISOLONE SODIUM SUCCINATE 125 MG: 125 INJECTION, POWDER, FOR SOLUTION INTRAMUSCULAR; INTRAVENOUS at 09:06

## 2025-06-03 RX ADMIN — ATORVASTATIN CALCIUM 40 MG: 40 TABLET, FILM COATED ORAL at 08:06

## 2025-06-03 RX ADMIN — PANTOPRAZOLE SODIUM 40 MG: 40 TABLET, DELAYED RELEASE ORAL at 12:06

## 2025-06-03 RX ADMIN — TAMSULOSIN HYDROCHLORIDE 0.4 MG: 0.4 CAPSULE ORAL at 12:06

## 2025-06-03 RX ADMIN — ASPIRIN 81 MG: 81 TABLET, COATED ORAL at 12:06

## 2025-06-03 RX ADMIN — METHYLPREDNISOLONE SODIUM SUCCINATE 60 MG: 125 INJECTION, POWDER, FOR SOLUTION INTRAMUSCULAR; INTRAVENOUS at 12:06

## 2025-06-03 RX ADMIN — CETIRIZINE HYDROCHLORIDE 10 MG: 10 TABLET, FILM COATED ORAL at 12:06

## 2025-06-03 RX ADMIN — IPRATROPIUM BROMIDE AND ALBUTEROL SULFATE 3 ML: 2.5; .5 SOLUTION RESPIRATORY (INHALATION) at 07:06

## 2025-06-03 RX ADMIN — METHYLPREDNISOLONE SODIUM SUCCINATE 60 MG: 125 INJECTION, POWDER, FOR SOLUTION INTRAMUSCULAR; INTRAVENOUS at 06:06

## 2025-06-03 NOTE — ED PROVIDER NOTES
NAME:  Babak Scales  CSN:     [id number]  MRN:    [id number]  ADMIT DATE: 6/3/2025        eMERGENCY dEPARTMENT eNCOUnter    CHIEF COMPLAINT    Chief Complaint   Patient presents with    Nasal Congestion     C/o nasal congestion and cough x 4 days -states was seen in the ED and not better        HPI      Babak Scales is a 79 y.o. male who presents to the ED for nasal congestion and cough ongoing for the last 4 days.  Patient was seen here yesterday for the same symptoms and discharged with steroids and antibiotics.  Patient reports his symptoms have not improved at all so he returned to the ED.  He denies any chest pain.  No fevers.          ALLERGIES    Review of patient's allergies indicates:   Allergen Reactions    Aspirin      GI distress with med, hx gastric ulcer    Metoprolol        PAST MEDICAL HISTORY  Past Medical History:   Diagnosis Date    Arthritis     Asthma     Atrial fibrillation     xarelto    Bladder cancer     Bladder disorder     CAD (coronary artery disease)     CHF (congestive heart failure)     COPD (chronic obstructive pulmonary disease)     Diverticulitis     Enlarged prostate     GERD (gastroesophageal reflux disease)     Heart damage     History of kidney stones     HLD (hyperlipidemia)     Kiana (hard of hearing)     Insomnia     Obesity     Skin cancer     SOB (shortness of breath)     Stroke     Thyroid disease        SURGICAL HISTORY    Past Surgical History:   Procedure Laterality Date    ABLATION N/A 4/14/2025    Procedure: Ablation;  Surgeon: Jono Rutherford MD;  Location: [facility] CATH LAB;  Service: Cardiology;  Laterality: N/A;    BLADDER SURGERY      CERVICAL FUSION      CHOLECYSTECTOMY      CLOSURE OF LEFT ATRIAL APPENDAGE USING DEVICE N/A 4/14/2025    Procedure: Left atrial appendage closure device;  Surgeon: Jono Rutherford MD;  Location: [facility] CATH LAB;  Service: Cardiology;  Laterality: N/A;  WATCHMAN W/ KIRBY + AF CATH ABLATION    COLONOSCOPY      ECHOCARDIOGRAM,TRANSESOPHAGEAL N/A  4/14/2025    Procedure: Transesophageal echo (KIRBY) intra-procedure log documentation;  Surgeon: Provider, Dosc Diagnostic;  Location: [facility] CATH LAB;  Service: Cardiology;  Laterality: N/A;    ESOPHAGOGASTRODUODENOSCOPY      EXCISION OF PTERYGIUM Left 06/25/2024    Procedure: EXCISION, PTERYGIUM WITH MMC AND CONJ AUTOGRAFT - OS;  Surgeon: Rohith Crespo MD;  Location: [facility] OR;  Service: Ophthalmology;  Laterality: Left;    EXCISIONAL HEMORRHOIDECTOMY      FUSION OF SACROILIAC JOINT      INSERTION OF PACEMAKER      2021    PHACOEMULSIFICATION, CATARACT, WITH IOL INSERTION Right 08/27/2024    Procedure: PHACOEMULSIFICATION, CATARACT, WITH IOL INSERTION- OD;  Surgeon: Rohith Crespo MD;  Location: [facility] OR;  Service: Ophthalmology;  Laterality: Right;    PHACOEMULSIFICATION, CATARACT, WITH IOL INSERTION Left 09/03/2024    Procedure: PHACOEMULSIFICATION, CATARACT, WITH IOL INSERTION- OS;  Surgeon: Rohith Crespo MD;  Location: [facility] OR;  Service: Ophthalmology;  Laterality: Left;    REPLACEMENT OF IMPLANTABLE CARDIOVERTER-DEFIBRILLATOR (ICD) BATTERY  07/2022    skin cancer removal Right     skin underneath the eye       SOCIAL HISTORY    Social History     Socioeconomic History    Marital status:    Tobacco Use    Smoking status: Former     Current packs/day: 1.00     Average packs/day: 1 pack/day for 20.0 years (20.0 ttl pk-yrs)     Types: Cigarettes    Smokeless tobacco: Never    Tobacco comments:     Quit 30 years ago   Substance and Sexual Activity    Alcohol use: Yes     Alcohol/week: 1.0 standard drink of alcohol     Types: 1 Cans of beer per week     Comment: 1 beer a week    Drug use: Never    Sexual activity: Not Currently       FAMILY HISTORY    Family History   Problem Relation Name Age of Onset    No Known Problems Mother      Cancer Father         REVIEW OF SYSTEMS   ROS  All Systems otherwise negative except as noted in the History of Present Illness.        PHYSICAL EXAM    Reviewed Triage  "Note  VITAL SIGNS:   ED Triage Vitals [06/03/25 0909]   Encounter Vitals Group      /72      Systolic BP Percentile       Diastolic BP Percentile       Pulse 106      Resp 20      Temp 98.4 °F (36.9 °C)      Temp Source Oral      SpO2 (!) 88 %      Weight 206 lb      Height 5' 10"      Head Circumference       Peak Flow       Pain Score       Pain Loc       Pain Education       Exclude from Growth Chart        Patient Vitals for the past 24 hrs:   BP Temp Temp src Pulse Resp SpO2 Height Weight   06/03/25 1041 -- -- -- 95 -- (!) 89 % -- --   06/03/25 1011 -- -- -- 95 -- (!) 91 % -- --   06/03/25 0959 -- -- -- 99 -- (!) 89 % -- --   06/03/25 0946 -- -- -- 88 20 (!) 88 % -- --   06/03/25 0909 135/72 98.4 °F (36.9 °C) Oral 106 20 (!) 88 % 5' 10" (1.778 m) 93.4 kg (206 lb)           Physical Exam    Constitutional:  Well-developed, well-nourished. No acute distress  HENT:  Normocephalic, atraumatic.  Eyes:  EOMI. Conjunctiva normal without discharge.   Neck: Normal range of motion.No stridor. No meningismus.   Respiratory:  Diffusely diminished breath sounds, scattered wheezes, no increased work of breathing   Cardiovascular:  Normal heart rate. Normal rhythm. No pitting lower extremity edema.   GI:  Abdomen soft, non-distended, non-tender. Normal bowel sounds. No guarding, rigidity or rebound.    : No CVA tenderness.   Musculoskeletal:  No gross deformity or limited range of motion of all major joints. No palpable bony deformity. No tenderness to palpation.  Integument:  Warm and dry. No rash.  Neurologic:  Normal motor function. Normal sensory function. No focal deficits noted. Alert and Interactive.  Psychiatric:  Affect normal. Mood normal.         LABS  Pertinent labs reviewed. (See chart for details)   Labs Reviewed   COMPREHENSIVE METABOLIC PANEL - Abnormal       Result Value    Sodium 140      Potassium 3.9      Chloride 104      CO2 27      Glucose 108      Blood Urea Nitrogen 19.7      Creatinine 0.70 " (*)     Calcium 9.0      Protein Total 7.6      Albumin 3.6      Globulin 4.0 (*)     Albumin/Globulin Ratio 0.9 (*)     Bilirubin Total 1.1      ALP 69      ALT 13      AST 24      eGFR >60      Anion Gap 9.0      BUN/Creatinine Ratio 28     CBC WITH DIFFERENTIAL - Abnormal    WBC 19.61 (*)     RBC 4.05 (*)     Hgb 12.9 (*)     Hct 38.9 (*)     MCV 96.0 (*)     MCH 31.9 (*)     MCHC 33.2      RDW 14.7      Platelet 205      MPV 8.6      Neut % 87.8      Lymph % 5.5      Mono % 6.3      Eos % 0.0      Basophil % 0.1      Imm Grans % 0.3      Neut # 17.24 (*)     Lymph # 1.07      Mono # 1.24      Eos # 0.00      Baso # 0.01      Imm Gran # 0.05 (*)    B-TYPE NATRIURETIC PEPTIDE - Normal    Natriuretic Peptide 76.9     TROPONIN I - Normal    Troponin-I 0.030     COVID/FLU A&B PCR - Normal    Influenza A PCR Not Detected      Influenza B PCR Not Detected      SARS-CoV-2 PCR Not Detected      Narrative:     The Xpert Xpress SARS-CoV-2/FLU/RSV plus is a rapid, multiplexed real-time PCR test intended for the simultaneous qualitative detection and differentiation of SARS-CoV-2, Influenza A, Influenza B, and respiratory syncytial virus (RSV) viral RNA in either nasopharyngeal swab or nasal swab specimens.         RESPIRATORY CULTURE (OLG)   CBC W/ AUTO DIFFERENTIAL    Narrative:     The following orders were created for panel order CBC auto differential.  Procedure                               Abnormality         Status                     ---------                               -----------         ------                     CBC with Differential[[id number]]       Abnormal            Final result                 Please view results for these tests on the individual orders.   D DIMER, QUANTITATIVE   RESPIRATORY PANEL   POCT RESPIRATORY SYNCYTIAL VIRUS         RADIOLOGY    Imaging Results              X-Ray Chest AP Portable (Final result)  Result time 06/03/25 10:07:36      Final result by Raul Cordero MD (06/03/25  10:07:36)                   Impression:      No acute cardiopulmonary process identified.      Electronically signed by: Raul Cordero  Date:    06/03/2025  Time:    10:07               Narrative:    EXAMINATION:  XR CHEST AP PORTABLE    CLINICAL HISTORY:  cough;    TECHNIQUE:  One view    COMPARISON:  June 2, 2025.    FINDINGS:  Cardiopericardial silhouette enlarged appearance similar.  Cardiac device electrodes terminate within the right atrium and the right ventricle.  No convincing acute dense focal or segmental consolidation, congestive process, pleural effusions or pneumothorax.                                      PROCEDURES    Procedures      EKG     Interpreted by ERP:     EKG Readings: (Independently Interpreted)   Rhythm: Atrial Fibrillation. Heart Rate: 105. Ectopy: No Ectopy. Conduction: Normal. ST Segments: Normal ST Segments. T Waves: Normal. Clinical Impression: Atrial Fibrillation       ED COURSE & MEDICAL DECISION MAKING    Pertinent & Imaging studies reviewed. (See chart for details and specific orders.)        Medications   levoFLOXacin 750 mg/150 mL IVPB 750 mg (has no administration in time range)   tamsulosin 24 hr capsule 0.4 mg (has no administration in time range)   amiodarone tablet 200 mg (has no administration in time range)   aspirin EC tablet 81 mg (has no administration in time range)   atorvastatin tablet 40 mg (has no administration in time range)   clopidogreL tablet 75 mg (has no administration in time range)   sacubitriL-valsartan 24-26 mg per tablet 1 tablet (has no administration in time range)   furosemide tablet 20 mg (has no administration in time range)   levothyroxine tablet 50 mcg (has no administration in time range)   cetirizine tablet 10 mg (has no administration in time range)   multivit-min-FA-lycopen-lutein 0.4 mg-300 mcg- 250 mcg tablet 1 tablet (has no administration in time range)   pantoprazole EC tablet 40 mg (has no administration in time range)   sodium  chloride 0.9% flush 10 mL (has no administration in time range)   enoxaparin injection 40 mg (has no administration in time range)   albuterol-ipratropium 2.5 mg-0.5 mg/3 mL nebulizer solution 3 mL (has no administration in time range)   melatonin tablet 9 mg (has no administration in time range)   ondansetron disintegrating tablet 8 mg (has no administration in time range)   ondansetron injection 4 mg (has no administration in time range)   polyethylene glycol packet 17 g (has no administration in time range)   bisacodyL suppository 10 mg (has no administration in time range)   simethicone chewable tablet 80 mg (has no administration in time range)   aluminum-magnesium hydroxide-simethicone 200-200-20 mg/5 mL suspension 30 mL (has no administration in time range)   acetaminophen tablet 650 mg (has no administration in time range)   HYDROcodone-acetaminophen 5-325 mg per tablet 1 tablet (has no administration in time range)   morphine injection 2 mg (has no administration in time range)   naloxone 0.4 mg/mL injection 0.02 mg (has no administration in time range)   albuterol-ipratropium 2.5 mg-0.5 mg/3 mL nebulizer solution 3 mL (has no administration in time range)   budesonide nebulizer solution 0.5 mg (has no administration in time range)   methylPREDNISolone sodium succinate injection 60 mg (has no administration in time range)   glucagon (human recombinant) injection 1 mg (has no administration in time range)   dextrose 50% injection 25 g (has no administration in time range)   dextrose 50% injection 12.5 g (has no administration in time range)   glucose chewable tablet 24 g (has no administration in time range)   glucose chewable tablet 16 g (has no administration in time range)   insulin aspart U-100 injection 0-10 Units (has no administration in time range)   methylPREDNISolone sodium succinate injection 125 mg (125 mg Intravenous Given 6/3/25 2848)   albuterol-ipratropium 2.5 mg-0.5 mg/3 mL nebulizer  solution 3 mL (3 mLs Nebulization Given 6/3/25 0996)          No change in patient's workup.  He failed outpatient management so we will be placed in observation for further care       DISPOSITION  Patient placed in obervation in stable condition at No discharge date for patient encounter.        FINAL IMPRESSION    1. COPD exacerbation    2. SOB (shortness of breath)    3. Chest pain                Critical care time spent with this patient (not including separately billable items) was  0 minutes.     DISCLAIMER: This note was prepared with Dragon NaturallySpeaking voice recognition transcription software. Garbled syntax, mangled pronouns, and other bizarre constructions may be attributed to that software system.      Casey Wilson MD  06/03/2025  11:24 AM           Casey Wilson MD  06/03/25 1126

## 2025-06-03 NOTE — PROGRESS NOTES
Name: Babak Scales    : 1945 (79 y.o.)  MRN: [id number]                                                                    Patient Unavailable        Patient unable to be seen at this time secondary to: patient refusing PT/OT evaluation. Reports he is independent with mobility and ADLs and does not have therapy needs at this time.

## 2025-06-03 NOTE — PT/OT/SLP PROGRESS
Name: Babak Scales    : 1945 (79 y.o.)  MRN: [id number]           Patient Unavailable      Patient unable to be seen at this time secondary to: patient refusing PT/OT evaluation. Reports he is independent with mobility and does not have therapy needs at this time.

## 2025-06-03 NOTE — PLAN OF CARE
Problem: Adult Inpatient Plan of Care  Goal: Plan of Care Review  Outcome: Progressing  Flowsheets (Taken 6/3/2025 1347)  Plan of Care Reviewed With: patient  Goal: Patient-Specific Goal (Individualized)  Outcome: Progressing  Flowsheets (Taken 6/3/2025 1347)  Individualized Care Needs: iv abx and steriods, monitor glucose, therapy evals  Anxieties, Fears or Concerns: none expressed     Problem: Infection  Goal: Absence of Infection Signs and Symptoms  Outcome: Progressing  Intervention: Prevent or Manage Infection  Flowsheets (Taken 6/3/2025 1347)  Fever Reduction/Comfort Measures:   lightweight bedding   lightweight clothing  Infection Management: aseptic technique maintained  Isolation Precautions:   precautions initiated   droplet

## 2025-06-03 NOTE — H&P
[facility]  [facility] MEDICINE - H&P ADMISSION NOTE      Patient Name: Babak Scales  MRN: [id number]  Patient Class: OP- Observation   Admission Date: 6/3/2025   Admitting Physician:  Service   Attending Physician: Deepti Carmona MD  Primary Care Provider: Guillermo Brown Jr., MD  Face-to-Face encounter date: 06/03/2025        CHIEF COMPLAINT     Chief Complaint   Patient presents with    Nasal Congestion     C/o nasal congestion and cough x 4 days -states was seen in the ED and not better        HISTORY OF PRESENTING ILLNESS   Code Status: Full  Social drivers: I have screed the patient for housing insecurity, food insecurity, access to follow up appointments as well as issues with safety at home and have not identified them at this time but will address as needs arise.     Mr. Babak Scales is a 79-year-old male presenting to the emergency department with persistent nasal congestion and cough for the past 4 days. He was evaluated in the ED yesterday for similar symptoms and discharged with a prescription for oral corticosteroids and antibiotics; however, he reports no clinical improvement and returns today due to ongoing discomfort. He denies chest pain, fever, or chills. Given the lack of response to outpatient therapy, he is being admitted under observation status for management of a presumed COPD/asthma exacerbation.      PAST MEDICAL HISTORY     Past Medical History:   Diagnosis Date    Arthritis     Asthma     Atrial fibrillation     xarelto    Bladder cancer     Bladder disorder     CAD (coronary artery disease)     CHF (congestive heart failure)     COPD (chronic obstructive pulmonary disease)     Diverticulitis     Enlarged prostate     GERD (gastroesophageal reflux disease)     Heart damage     History of kidney stones     HLD (hyperlipidemia)     [place] (hard of hearing)     Insomnia     Obesity     Skin cancer     SOB (shortness of breath)     Stroke     Thyroid  disease        PAST SURGICAL HISTORY     Past Surgical History:   Procedure Laterality Date    ABLATION N/A 4/14/2025    Procedure: Ablation;  Surgeon: Jono Rutherford MD;  Location: [facility] CATH LAB;  Service: Cardiology;  Laterality: N/A;    BLADDER SURGERY      CERVICAL FUSION      CHOLECYSTECTOMY      CLOSURE OF LEFT ATRIAL APPENDAGE USING DEVICE N/A 4/14/2025    Procedure: Left atrial appendage closure device;  Surgeon: Jono Rutherford MD;  Location: [facility] CATH LAB;  Service: Cardiology;  Laterality: N/A;  WATCHMAN W/ KIRBY + AF CATH ABLATION    COLONOSCOPY      ECHOCARDIOGRAM,TRANSESOPHAGEAL N/A 4/14/2025    Procedure: Transesophageal echo (KIRBY) intra-procedure log documentation;  Surgeon: Provider, Dosc Diagnostic;  Location: [facility] CATH LAB;  Service: Cardiology;  Laterality: N/A;    ESOPHAGOGASTRODUODENOSCOPY      EXCISION OF PTERYGIUM Left 06/25/2024    Procedure: EXCISION, PTERYGIUM WITH MMC AND CONJ AUTOGRAFT - OS;  Surgeon: Rohith Crespo MD;  Location: [facility] OR;  Service: Ophthalmology;  Laterality: Left;    EXCISIONAL HEMORRHOIDECTOMY      FUSION OF SACROILIAC JOINT      INSERTION OF PACEMAKER      2021    PHACOEMULSIFICATION, CATARACT, WITH IOL INSERTION Right 08/27/2024    Procedure: PHACOEMULSIFICATION, CATARACT, WITH IOL INSERTION- OD;  Surgeon: Rohith Crespo MD;  Location: [facility] OR;  Service: Ophthalmology;  Laterality: Right;    PHACOEMULSIFICATION, CATARACT, WITH IOL INSERTION Left 09/03/2024    Procedure: PHACOEMULSIFICATION, CATARACT, WITH IOL INSERTION- OS;  Surgeon: Rohith Crespo MD;  Location: [facility] OR;  Service: Ophthalmology;  Laterality: Left;    REPLACEMENT OF IMPLANTABLE CARDIOVERTER-DEFIBRILLATOR (ICD) BATTERY  07/2022    skin cancer removal Right     skin underneath the eye       FAMILY HISTORY   Reviewed and noncontributory to this case    SOCIAL HISTORY   Social History[1]    HOME MEDICATIONS     Prior to Admission medications    Medication Sig Start Date End Date Taking? Authorizing  Provider   albuterol (VENTOLIN HFA) 90 mcg/actuation inhaler Inhale 2 puffs into the lungs every 6 (six) hours as needed for Wheezing. Rescue 10/19/23  Yes Obey Black MD   alfuzosin (UROXATRAL) 10 mg Tb24 Take 10 mg by mouth once daily. 4/12/22  Yes Provider, Historical   amiodarone (PACERONE) 200 MG Tab Take 1 tablet by mouth Daily. 7/2/22  Yes Provider, Historical   aspirin (ECOTRIN) 81 MG EC tablet Take 1 tablet (81 mg total) by mouth once daily.  Patient not taking: Reported on 6/3/2025 5/22/25 5/22/26  Jono Rutherford MD   atorvastatin (LIPITOR) 40 MG tablet Take 40 mg by mouth every evening. 4/4/22  Yes Provider, Historical   azithromycin (Z-ADIS) 250 MG tablet Take 1 tablet (250 mg total) by mouth once daily. 6/2/25  Yes Mariangel Verdugo MD   clopidogreL (PLAVIX) 75 mg tablet Take 1 tablet (75 mg total) by mouth once daily. 5/22/25 5/22/26  Jono Rutherford MD   ENTRESTO 24-26 mg per tablet Take 1 tablet by mouth 2 (two) times daily. 4/12/22  Yes Provider, Historical   furosemide (LASIX) 20 MG tablet Take 20 mg by mouth every morning. 7/16/22  Yes Provider, Historical   levalbuterol (XOPENEX HFA) 45 mcg/actuation inhaler See Instructions, INHALE 2 PUFFS BY MOUTH EVERY FOUR (4) HOURS AS NEEDED FOR WHEEZING, # 15 cap(s), 5 Refill(s), Pharmacy: [facility], 167, cm, Height/Length Dosing, 02/14/21 14:28:00 CST, 74, kg, Weight Dosing, 02/14/21 14:28:00 CST 3/25/25  Yes Obey Black MD   levothyroxine (SYNTHROID) 50 MCG tablet Take 50 mcg by mouth once daily. 4/8/22  Yes Provider, Historical   loratadine (CLARITIN) 10 mg tablet Take 10 mg by mouth once daily. 7/16/22  Yes Provider, Historical   mv-min-folic-K1-lycopen-lutein (CENTRUM SILVER MEN) 893--300 mcg Tab Take 1 tablet by mouth once daily.   Yes Provider, Historical   nitroGLYCERIN (NITROSTAT) 0.4 MG SL tablet Place 0.4 mg under the tongue every 5 (five) minutes as needed for Chest pain.  Patient not taking: Reported on 4/16/2025     Provider, Historical   olopatadine (PATANOL) 0.1 % ophthalmic solution Place 1-2 drops into both eyes once daily.  Patient not taking: Reported on 6/3/2025    Provider, Historical   omeprazole (PRILOSEC OTC) 20 MG tablet Take 20 mg by mouth once daily.   Yes Provider, Historical   pantoprazole (PROTONIX) 40 MG tablet Take 1 tablet (40 mg total) by mouth once daily. 4/15/25 5/15/25  Raj Noe, [facility]   predniSONE (DELTASONE) 20 MG tablet Take 2 tablets (40 mg total) by mouth once daily. for 5 days 6/2/25 6/7/25 Yes Mariangel Verdugo MD   tadalafiL (CIALIS) 5 MG tablet Take 5 mg by mouth daily as needed. 2/10/25  Yes Provider, Historical   TRELEGY ELLIPTA 100-62.5-25 mcg DsDv Inhale 1 puff into the lungs once daily. 6/18/24  Yes Obey Black MD       ALLERGIES   Aspirin and Metoprolol          REVIEW OF SYSTEMS   Except as documented above, all other systems reviewed and negative    PHYSICAL EXAM     Vitals:    06/03/25 1236   BP:    Pulse: 78   Resp: 20   Temp:       General:  In no acute distress, resting comfortably  Head and neck:  Atraumatic, normocephalic, moist mucous membranes, supple neck  Chest:  wheezes g.l   Heart:  S1, S2, no appreciable murmur  Abdomen:  Soft, nontender, BS +  MSK:  Warm, no lower extremity edema, no clubbing or cyanosis  Neuro:  Alert and oriented x4, moving all extremities with good strength  Integumentary:  No obvious skin rash  Psychiatry:  Appropriate mood and affect          ASSESSMENT AND PLAN     Active Hospital Problems    Diagnosis  POA    *COPD exacerbation [J44.1]  Yes      Resolved Hospital Problems   No resolved problems to display.    COPD/Asthma Exacerbation  Persistent cough and nasal congestion for 4 days with minimal response to outpatient steroids and antibiotics.  Initiated:  Albuterol-Ipratropium nebs Q6H scheduled and PRN  Budesonide nebulizer BID  IV methylprednisolone 60 mg Q6H  Levofloxacin 750 mg IV daily x 5 days (broad coverage for potential  bacterial bronchitis or pneumonia)  Droplet precautions in place  Continuous O2 with titration to maintain SpO? >= 90%  Incentive spirometry Q4H while awake  Continuous pulse oximetry  Respiratory culture and RSV testing sent  Monitor for wheezing, increased work of breathing, or hypoxia  Plan to de-escalate to oral therapy if clinical improvement  History of Atrial Fibrillation on Amiodarone  Amiodarone 200 mg PO daily continued  Monitor QT interval, HR; cardiac monitoring in place  Avoid macrolides and other QT-prolonging agents  CHF with HFrEF (on sacubitril-valsartan)  Continue current home regimen including sacubitril-valsartan 24/26 mg BID and furosemide 20 mg daily  Monitor fluid status, daily weights, electrolytes  Maintain BP parameters to avoid hypotension  CAD/Post-MI  Continue aspirin 81 mg (noted GI intolerance, monitor for symptoms)  Continue clopidogrel 75 mg daily  Continue atorvastatin 40 mg nightly  DVT Prophylaxis  Enoxaparin 40 mg SC daily initiated  SCDs placed  GERD/Peptic Ulcer Disease  Pantoprazole 40 mg PO daily  Antacid PRN (aluminum-magnesium-simethicone)  Thyroid Disease  Continue levothyroxine 50 mcg PO daily  Other Comorbidities:  BPH ? Tamsulosin 0.4 mg PO daily continued  Hyperlipidemia ? Continue statin  Obesity, osteoarthritis ? PT/OT to evaluate and treat  History of insomnia ? Melatonin 9 mg PRN at bedtime  Pain Management  Acetaminophen 650 mg PO Q4H PRN mild pain/fever  Hydrocodone-APAP PRN moderate pain  Morphine IV PRN for severe pain  Naloxone ordered PRN for reversal  Diabetes Monitoring  POCT glucose 4x/day  Insulin aspart sliding scale PRN  Nutrition  Heart healthy diet initiated  Multivitamin PO daily  Code Status: Full  Patient is alert, oriented, and capable of participating in medical decision-making  Disposition Plan  Observation admission; will reassess response to therapy within 24-48 hours  Will consider stepdown to oral steroids/bronchodilators as  tolerated  Discharge planning contingent on clinical improvement and oxygen needs      DVT prophylaxis:  Lovenox  __________________________________________________________________  LABS/MICRO/MEDS/DIAGNOSTICS       LABS  Recent Labs     06/03/25  0957      K 3.9   CO2 27   BUN 19.7   CREATININE 0.70*   CALCIUM 9.0   ALKPHOS 69   AST 24   ALT 13   ALBUMIN 3.6     Recent Labs     06/03/25  0954   WBC 19.61*   RBC 4.05*   HCT 38.9*   MCV 96.0*          MICROBIOLOGY  Microbiology Results (last 7 days)       Procedure Component Value Units Date/Time    Respiratory Culture [[id number]]     Order Status: Sent Specimen: Sputum, Expectorated             MEDICATIONS   albuterol-ipratropium  3 mL Nebulization Q6H    amiodarone  200 mg Oral Daily    aspirin  81 mg Oral Daily    atorvastatin  40 mg Oral QHS    budesonide  0.5 mg Nebulization Q12H    cetirizine  10 mg Oral Daily    clopidogreL  75 mg Oral Daily    enoxparin  40 mg Subcutaneous Daily    furosemide  20 mg Oral QAM    levoFLOXacin  750 mg Intravenous Q24H    levothyroxine  50 mcg Oral Daily    methylPREDNISolone injection (PEDS and ADULTS)  60 mg Intravenous Q6H    multivitamin  1 tablet Oral Daily    pantoprazole  40 mg Oral Daily    sacubitriL-valsartan  1 tablet Oral BID    tamsulosin  0.4 mg Oral Daily      INFUSIONS      DIAGNOSTIC TESTS  X-Ray Chest AP Portable   Final Result      No acute cardiopulmonary process identified.         Electronically signed by: Raul Cordero   Date:    06/03/2025   Time:    10:07             Patient information was obtained from patient, patient's family, past medical records and ER records.   All diagnosis and differential diagnosis have been reviewed; assessment and plan has been documented. I have personally reviewed the labs and test results that are presently available; I have reviewed the patients medication list. I have reviewed the consulting providers response and recommendations. I have reviewed or  attempted to review medical records based upon their availability.  All of the patient's questions have been addressed and answered. Patient's is agreeable to the above stated plan. I will continue to monitor closely and make adjustments to medical management as needed.  This note was created using YAMAP voice recognition software that occasionally misinterpreted phrases or words.  Please contact me if any questions may rise regarding documentation to clarify verbiage.        Deepti Carmona MD   Internal Medicine  Department of Hospital Medicine [facility]         [1]   Social History  Socioeconomic History    Marital status:    Tobacco Use    Smoking status: Former     Current packs/day: 1.00     Average packs/day: 1 pack/day for 20.0 years (20.0 ttl pk-yrs)     Types: Cigarettes    Smokeless tobacco: Never    Tobacco comments:     Quit 30 years ago   Substance and Sexual Activity    Alcohol use: Yes     Alcohol/week: 1.0 standard drink of alcohol     Types: 1 Cans of beer per week     Comment: 1 beer a week    Drug use: Never    Sexual activity: Not Currently

## 2025-06-04 LAB
ALBUMIN SERPL-MCNC: 3.1 G/DL (ref 3.4–4.8)
ALBUMIN/GLOB SERPL: 0.9 RATIO (ref 1.1–2)
ALP SERPL-CCNC: 60 UNIT/L (ref 40–150)
ALT SERPL-CCNC: 15 UNIT/L (ref 0–55)
ANION GAP SERPL CALC-SCNC: 5 MEQ/L
AST SERPL-CCNC: 25 UNIT/L (ref 11–45)
B PERT.PT PRMT NPH QL NAA+NON-PROBE: NOT DETECTED
BASOPHILS # BLD AUTO: 0 X10(3)/MCL
BASOPHILS NFR BLD AUTO: 0 %
BILIRUB SERPL-MCNC: 1 MG/DL
BSA FOR ECHO PROCEDURE: 2.12 M2
BUN SERPL-MCNC: 19.9 MG/DL (ref 8.4–25.7)
C PNEUM DNA NPH QL NAA+NON-PROBE: NOT DETECTED
CALCIUM SERPL-MCNC: 8.5 MG/DL (ref 8.8–10)
CHLORIDE SERPL-SCNC: 105 MMOL/L (ref 98–107)
CO2 SERPL-SCNC: 30 MMOL/L (ref 23–31)
CREAT SERPL-MCNC: 0.64 MG/DL (ref 0.72–1.25)
CREAT/UREA NIT SERPL: 31
EOSINOPHIL # BLD AUTO: 0 X10(3)/MCL (ref 0–0.9)
EOSINOPHIL NFR BLD AUTO: 0 %
ERYTHROCYTE [DISTWIDTH] IN BLOOD BY AUTOMATED COUNT: 14.8 % (ref 11.5–17)
FIO2: 21
GFR SERPLBLD CREATININE-BSD FMLA CKD-EPI: >60 ML/MIN/1.73/M2
GLOBULIN SER-MCNC: 3.4 GM/DL (ref 2.4–3.5)
GLUCOSE SERPL-MCNC: 148 MG/DL (ref 70–110)
GLUCOSE SERPL-MCNC: 148 MG/DL (ref 82–115)
HADV DNA NPH QL NAA+NON-PROBE: NOT DETECTED
HCO3 UR-SCNC: 26.4 MMOL/L (ref 24–28)
HCOV 229E RNA NPH QL NAA+NON-PROBE: NOT DETECTED
HCOV HKU1 RNA NPH QL NAA+NON-PROBE: NOT DETECTED
HCOV NL63 RNA NPH QL NAA+NON-PROBE: NOT DETECTED
HCOV OC43 RNA NPH QL NAA+NON-PROBE: NOT DETECTED
HCT VFR BLD AUTO: 36.2 % (ref 42–52)
HGB BLD-MCNC: 11.9 G/DL (ref 14–18)
HMPV RNA NPH QL NAA+NON-PROBE: DETECTED
HPIV1 RNA NPH QL NAA+NON-PROBE: NOT DETECTED
HPIV2 RNA NPH QL NAA+NON-PROBE: NOT DETECTED
HPIV3 RNA NPH QL NAA+NON-PROBE: NOT DETECTED
HPIV4 RNA NPH QL NAA+NON-PROBE: NOT DETECTED
IMM GRANULOCYTES # BLD AUTO: 0.05 X10(3)/MCL (ref 0–0.04)
IMM GRANULOCYTES NFR BLD AUTO: 0.3 %
LYMPHOCYTES # BLD AUTO: 0.6 X10(3)/MCL (ref 0.6–4.6)
LYMPHOCYTES NFR BLD AUTO: 3.9 %
M PNEUMO DNA NPH QL NAA+NON-PROBE: NOT DETECTED
MAGNESIUM SERPL-MCNC: 2.3 MG/DL (ref 1.6–2.6)
MCH RBC QN AUTO: 31.7 PG (ref 27–31)
MCHC RBC AUTO-ENTMCNC: 32.9 G/DL (ref 33–36)
MCV RBC AUTO: 96.5 FL (ref 80–94)
MONOCYTES # BLD AUTO: 0.35 X10(3)/MCL (ref 0.1–1.3)
MONOCYTES NFR BLD AUTO: 2.3 %
NEUTROPHILS # BLD AUTO: 14.54 X10(3)/MCL (ref 2.1–9.2)
NEUTROPHILS NFR BLD AUTO: 93.5 %
OHS QRS DURATION: 170 MS
OHS QTC CALCULATION: 555 MS
PCO2 BLDA: 48.3 MMHG (ref 35–45)
PH SMN: 7.34 [PH] (ref 7.35–7.45)
PHOSPHATE SERPL-MCNC: 2.8 MG/DL (ref 2.3–4.7)
PLATELET # BLD AUTO: 184 X10(3)/MCL (ref 130–400)
PMV BLD AUTO: 8.7 FL (ref 7.4–10.4)
PO2 BLDA: 69 MMHG (ref 80–100)
POC BE: 1 MMOL/L (ref -2–2)
POC SATURATED O2: 92 % (ref 95–100)
POC TCO2: 28 MMOL/L (ref 23–27)
POCT GLUCOSE: 155 MG/DL (ref 70–110)
POCT GLUCOSE: 167 MG/DL (ref 70–110)
POCT GLUCOSE: 206 MG/DL (ref 70–110)
POTASSIUM SERPL-SCNC: 4.5 MMOL/L (ref 3.5–5.1)
PROT SERPL-MCNC: 6.5 GM/DL (ref 5.8–7.6)
RBC # BLD AUTO: 3.75 X10(6)/MCL (ref 4.7–6.1)
RSV RNA NPH QL NAA+NON-PROBE: NOT DETECTED
RV+EV RNA NPH QL NAA+NON-PROBE: NOT DETECTED
SAMPLE: ABNORMAL
SODIUM SERPL-SCNC: 140 MMOL/L (ref 136–145)
WBC # BLD AUTO: 15.54 X10(3)/MCL (ref 4.5–11.5)

## 2025-06-04 PROCEDURE — 94640 AIRWAY INHALATION TREATMENT: CPT

## 2025-06-04 PROCEDURE — 25000242 PHARM REV CODE 250 ALT 637 W/ HCPCS: Performed by: INTERNAL MEDICINE

## 2025-06-04 PROCEDURE — 87581 M.PNEUMON DNA AMP PROBE: CPT | Performed by: PHYSICIAN ASSISTANT

## 2025-06-04 PROCEDURE — 25000003 PHARM REV CODE 250: Performed by: PHYSICIAN ASSISTANT

## 2025-06-04 PROCEDURE — 36600 WITHDRAWAL OF ARTERIAL BLOOD: CPT

## 2025-06-04 PROCEDURE — 80053 COMPREHEN METABOLIC PANEL: CPT | Performed by: INTERNAL MEDICINE

## 2025-06-04 PROCEDURE — 96372 THER/PROPH/DIAG INJ SC/IM: CPT | Performed by: PHYSICIAN ASSISTANT

## 2025-06-04 PROCEDURE — 84100 ASSAY OF PHOSPHORUS: CPT | Performed by: INTERNAL MEDICINE

## 2025-06-04 PROCEDURE — 25000242 PHARM REV CODE 250 ALT 637 W/ HCPCS: Performed by: PHYSICIAN ASSISTANT

## 2025-06-04 PROCEDURE — 99900031 HC PATIENT EDUCATION (STAT)

## 2025-06-04 PROCEDURE — 25000003 PHARM REV CODE 250: Performed by: INTERNAL MEDICINE

## 2025-06-04 PROCEDURE — 83735 ASSAY OF MAGNESIUM: CPT | Performed by: INTERNAL MEDICINE

## 2025-06-04 PROCEDURE — 82803 BLOOD GASES ANY COMBINATION: CPT

## 2025-06-04 PROCEDURE — 63600175 PHARM REV CODE 636 W HCPCS: Mod: JZ,TB | Performed by: PHYSICIAN ASSISTANT

## 2025-06-04 PROCEDURE — 94799 UNLISTED PULMONARY SVC/PX: CPT

## 2025-06-04 PROCEDURE — 21400001 HC TELEMETRY ROOM

## 2025-06-04 PROCEDURE — 27000221 HC OXYGEN, UP TO 24 HOURS

## 2025-06-04 PROCEDURE — 94760 N-INVAS EAR/PLS OXIMETRY 1: CPT

## 2025-06-04 PROCEDURE — 27000207 HC ISOLATION

## 2025-06-04 PROCEDURE — 85025 COMPLETE CBC W/AUTO DIFF WBC: CPT | Performed by: INTERNAL MEDICINE

## 2025-06-04 PROCEDURE — 36415 COLL VENOUS BLD VENIPUNCTURE: CPT | Performed by: INTERNAL MEDICINE

## 2025-06-04 PROCEDURE — 99900035 HC TECH TIME PER 15 MIN (STAT)

## 2025-06-04 PROCEDURE — 94640 AIRWAY INHALATION TREATMENT: CPT | Mod: XB

## 2025-06-04 RX ORDER — CODEINE PHOSPHATE AND GUAIFENESIN 10; 100 MG/5ML; MG/5ML
5 SOLUTION ORAL EVERY 4 HOURS PRN
Status: DISCONTINUED | OUTPATIENT
Start: 2025-06-04 | End: 2025-06-07 | Stop reason: HOSPADM

## 2025-06-04 RX ORDER — IPRATROPIUM BROMIDE AND ALBUTEROL SULFATE 2.5; .5 MG/3ML; MG/3ML
3 SOLUTION RESPIRATORY (INHALATION) EVERY 4 HOURS
Status: DISCONTINUED | OUTPATIENT
Start: 2025-06-04 | End: 2025-06-07 | Stop reason: HOSPADM

## 2025-06-04 RX ORDER — ACETYLCYSTEINE 200 MG/ML
4 SOLUTION ORAL; RESPIRATORY (INHALATION)
Status: DISCONTINUED | OUTPATIENT
Start: 2025-06-04 | End: 2025-06-05

## 2025-06-04 RX ADMIN — BUDESONIDE 0.5 MG: 0.5 INHALANT RESPIRATORY (INHALATION) at 07:06

## 2025-06-04 RX ADMIN — IPRATROPIUM BROMIDE AND ALBUTEROL SULFATE 3 ML: 2.5; .5 SOLUTION RESPIRATORY (INHALATION) at 12:06

## 2025-06-04 RX ADMIN — LEVOFLOXACIN 750 MG: 750 INJECTION, SOLUTION INTRAVENOUS at 01:06

## 2025-06-04 RX ADMIN — IPRATROPIUM BROMIDE AND ALBUTEROL SULFATE 3 ML: 2.5; .5 SOLUTION RESPIRATORY (INHALATION) at 03:06

## 2025-06-04 RX ADMIN — AMIODARONE HYDROCHLORIDE 200 MG: 200 TABLET ORAL at 04:06

## 2025-06-04 RX ADMIN — IPRATROPIUM BROMIDE AND ALBUTEROL SULFATE 3 ML: 2.5; .5 SOLUTION RESPIRATORY (INHALATION) at 08:06

## 2025-06-04 RX ADMIN — ACETYLCYSTEINE 4 ML: 200 INHALANT RESPIRATORY (INHALATION) at 12:06

## 2025-06-04 RX ADMIN — Medication 2 CAPSULE: at 11:06

## 2025-06-04 RX ADMIN — THERA TABS 1 TABLET: TAB at 08:06

## 2025-06-04 RX ADMIN — ACETYLCYSTEINE 4 ML: 200 INHALANT RESPIRATORY (INHALATION) at 08:06

## 2025-06-04 RX ADMIN — METHYLPREDNISOLONE SODIUM SUCCINATE 60 MG: 125 INJECTION, POWDER, FOR SOLUTION INTRAMUSCULAR; INTRAVENOUS at 11:06

## 2025-06-04 RX ADMIN — IPRATROPIUM BROMIDE AND ALBUTEROL SULFATE 3 ML: 2.5; .5 SOLUTION RESPIRATORY (INHALATION) at 07:06

## 2025-06-04 RX ADMIN — ASPIRIN 81 MG: 81 TABLET, COATED ORAL at 08:06

## 2025-06-04 RX ADMIN — INSULIN ASPART 2 UNITS: 100 INJECTION, SOLUTION INTRAVENOUS; SUBCUTANEOUS at 05:06

## 2025-06-04 RX ADMIN — IPRATROPIUM BROMIDE AND ALBUTEROL SULFATE 3 ML: 2.5; .5 SOLUTION RESPIRATORY (INHALATION) at 11:06

## 2025-06-04 RX ADMIN — FUROSEMIDE 20 MG: 20 TABLET ORAL at 06:06

## 2025-06-04 RX ADMIN — ATORVASTATIN CALCIUM 40 MG: 40 TABLET, FILM COATED ORAL at 08:06

## 2025-06-04 RX ADMIN — METHYLPREDNISOLONE SODIUM SUCCINATE 60 MG: 125 INJECTION, POWDER, FOR SOLUTION INTRAMUSCULAR; INTRAVENOUS at 05:06

## 2025-06-04 RX ADMIN — LEVOTHYROXINE SODIUM 50 MCG: 50 TABLET ORAL at 08:06

## 2025-06-04 RX ADMIN — SACUBITRIL AND VALSARTAN 1 TABLET: 24; 26 TABLET, FILM COATED ORAL at 08:06

## 2025-06-04 RX ADMIN — MELATONIN TAB 3 MG 9 MG: 3 TAB at 12:06

## 2025-06-04 RX ADMIN — Medication 2 CAPSULE: at 08:06

## 2025-06-04 RX ADMIN — INSULIN ASPART 2 UNITS: 100 INJECTION, SOLUTION INTRAVENOUS; SUBCUTANEOUS at 11:06

## 2025-06-04 RX ADMIN — METHYLPREDNISOLONE SODIUM SUCCINATE 60 MG: 125 INJECTION, POWDER, FOR SOLUTION INTRAMUSCULAR; INTRAVENOUS at 12:06

## 2025-06-04 RX ADMIN — METHYLPREDNISOLONE SODIUM SUCCINATE 60 MG: 125 INJECTION, POWDER, FOR SOLUTION INTRAMUSCULAR; INTRAVENOUS at 06:06

## 2025-06-04 RX ADMIN — INSULIN ASPART 2 UNITS: 100 INJECTION, SOLUTION INTRAVENOUS; SUBCUTANEOUS at 08:06

## 2025-06-04 RX ADMIN — Medication 2 CAPSULE: at 04:06

## 2025-06-04 RX ADMIN — CLOPIDOGREL BISULFATE 75 MG: 75 TABLET ORAL at 08:06

## 2025-06-04 RX ADMIN — PANTOPRAZOLE SODIUM 40 MG: 40 TABLET, DELAYED RELEASE ORAL at 08:06

## 2025-06-04 RX ADMIN — TAMSULOSIN HYDROCHLORIDE 0.4 MG: 0.4 CAPSULE ORAL at 08:06

## 2025-06-04 RX ADMIN — MELATONIN TAB 3 MG 9 MG: 3 TAB at 08:06

## 2025-06-04 RX ADMIN — CETIRIZINE HYDROCHLORIDE 10 MG: 10 TABLET, FILM COATED ORAL at 08:06

## 2025-06-04 RX ADMIN — GUAIFENESIN AND CODEINE PHOSPHATE 5 ML: 100; 10 SOLUTION ORAL at 09:06

## 2025-06-04 NOTE — PLAN OF CARE
Problem: Adult Inpatient Plan of Care  Goal: Plan of Care Review  6/4/2025 0549 by Sharlene Stephenson RN  Outcome: Progressing  Flowsheets (Taken 6/4/2025 0549)  Plan of Care Reviewed With: patient  6/4/2025 0537 by Sharlene Stephenson RN  Outcome: Progressing  Goal: Patient-Specific Goal (Individualized)  6/4/2025 0549 by Sharlene Stephenson RN  Outcome: Progressing  Flowsheets (Taken 6/4/2025 0549)  Individualized Care Needs: IV abx, IV steroids, oxygen therapy  Anxieties, Fears or Concerns: none expressed  Patient/Family-Specific Goals (Include Timeframe): to be free of infection and back to [facility] by D/C  6/4/2025 0537 by Sharlene Stephenson RN  Outcome: Progressing     Problem: Infection  Goal: Absence of Infection Signs and Symptoms  6/4/2025 0549 by Sharlene Stephenson RN  Outcome: Progressing  6/4/2025 0537 by Sharlene Stephenson RN  Outcome: Progressing  Intervention: Prevent or Manage Infection  Flowsheets (Taken 6/4/2025 0549)  Fever Reduction/Comfort Measures:   lightweight bedding   lightweight clothing  Infection Management: aseptic technique maintained  Isolation Precautions:   precautions maintained   droplet

## 2025-06-04 NOTE — PROGRESS NOTES
[facility] MEDICINE - Progress Note      Patient Name: Babak Scales  MRN: [id number]  Patient Class: OP- Observation   Admission Date: 6/3/2025   Admitting Physician:  Service   Attending Physician: Deepti Carmona MD  Primary Care Provider: Guillermo Brown Jr., MD  Face-to-Face encounter date: 06/04/2025        CHIEF COMPLAINT     Chief Complaint   Patient presents with    Nasal Congestion     C/o nasal congestion and cough x 4 days -states was seen in the ED and not better        HISTORY OF PRESENTING ILLNESS   Code Status: Full  Social drivers: I have screed the patient for housing insecurity, food insecurity, access to follow up appointments as well as issues with safety at home and have not identified them at this time but will address as needs arise.     Mr. Babak Scales is a 79-year-old male presenting to the emergency department with persistent nasal congestion and cough for the past 4 days. He was evaluated in the ED yesterday for similar symptoms and discharged with a prescription for oral corticosteroids and antibiotics; however, he reports no clinical improvement and returns today due to ongoing discomfort. He denies chest pain, fever, or chills. Given the lack of response to outpatient therapy, he is being admitted under observation status for management of a presumed COPD/asthma exacerbation.    Today: Patient remains short of breath. Will obtain CT chest without contrast for further evaluation. Initiating acetylcysteine (Mucomyst) and obtaining an arterial blood gas. Pulmonary Telemedicine consultation has been requested for further management recommendations. ECHO also ordered as well as CIS consultation.       PAST MEDICAL HISTORY     Past Medical History:   Diagnosis Date    Arthritis     Asthma     Atrial fibrillation     xarelto    Bladder cancer     Bladder disorder     CAD (coronary artery disease)     CHF (congestive heart failure)     COPD  (chronic obstructive pulmonary disease)     Diverticulitis     Enlarged prostate     GERD (gastroesophageal reflux disease)     Heart damage     History of kidney stones     HLD (hyperlipidemia)     [place] (hard of hearing)     Insomnia     Obesity     Skin cancer     SOB (shortness of breath)     Stroke     Thyroid disease        PAST SURGICAL HISTORY     Past Surgical History:   Procedure Laterality Date    ABLATION N/A 4/14/2025    Procedure: Ablation;  Surgeon: Jono Rutherford MD;  Location: [facility] CATH LAB;  Service: Cardiology;  Laterality: N/A;    BLADDER SURGERY      CERVICAL FUSION      CHOLECYSTECTOMY      CLOSURE OF LEFT ATRIAL APPENDAGE USING DEVICE N/A 4/14/2025    Procedure: Left atrial appendage closure device;  Surgeon: Jono Rutherford MD;  Location: [facility] CATH LAB;  Service: Cardiology;  Laterality: N/A;  WATCHMAN W/ KIRBY + AF CATH ABLATION    COLONOSCOPY      ECHOCARDIOGRAM,TRANSESOPHAGEAL N/A 4/14/2025    Procedure: Transesophageal echo (KIRBY) intra-procedure log documentation;  Surgeon: Provider, Dosc Diagnostic;  Location: [facility] CATH LAB;  Service: Cardiology;  Laterality: N/A;    ESOPHAGOGASTRODUODENOSCOPY      EXCISION OF PTERYGIUM Left 06/25/2024    Procedure: EXCISION, PTERYGIUM WITH MMC AND CONJ AUTOGRAFT - OS;  Surgeon: Rohith Crespo MD;  Location: [facility] OR;  Service: Ophthalmology;  Laterality: Left;    EXCISIONAL HEMORRHOIDECTOMY      FUSION OF SACROILIAC JOINT      INSERTION OF PACEMAKER      2021    PHACOEMULSIFICATION, CATARACT, WITH IOL INSERTION Right 08/27/2024    Procedure: PHACOEMULSIFICATION, CATARACT, WITH IOL INSERTION- OD;  Surgeon: Rohith Crespo MD;  Location: [facility] OR;  Service: Ophthalmology;  Laterality: Right;    PHACOEMULSIFICATION, CATARACT, WITH IOL INSERTION Left 09/03/2024    Procedure: PHACOEMULSIFICATION, CATARACT, WITH IOL INSERTION- OS;  Surgeon: Rohith Crespo MD;  Location: [facility] OR;  Service: Ophthalmology;  Laterality: Left;    REPLACEMENT OF IMPLANTABLE  CARDIOVERTER-DEFIBRILLATOR (ICD) BATTERY  07/2022    skin cancer removal Right     skin underneath the eye       FAMILY HISTORY   Reviewed and noncontributory to this case    SOCIAL HISTORY   Social History[1]    HOME MEDICATIONS     Prior to Admission medications    Medication Sig Start Date End Date Taking? Authorizing Provider   albuterol (VENTOLIN HFA) 90 mcg/actuation inhaler Inhale 2 puffs into the lungs every 6 (six) hours as needed for Wheezing. Rescue 10/19/23  Yes Obey Black MD   alfuzosin (UROXATRAL) 10 mg Tb24 Take 10 mg by mouth once daily. 4/12/22  Yes Provider, Historical   amiodarone (PACERONE) 200 MG Tab Take 1 tablet by mouth Daily. 7/2/22  Yes Provider, Historical   aspirin (ECOTRIN) 81 MG EC tablet Take 1 tablet (81 mg total) by mouth once daily.  Patient not taking: Reported on 6/3/2025 5/22/25 5/22/26  Jono Rutherford MD   atorvastatin (LIPITOR) 40 MG tablet Take 40 mg by mouth every evening. 4/4/22  Yes Provider, Historical   azithromycin (Z-ADIS) 250 MG tablet Take 1 tablet (250 mg total) by mouth once daily. 6/2/25  Yes Mariangel Verdugo MD   clopidogreL (PLAVIX) 75 mg tablet Take 1 tablet (75 mg total) by mouth once daily. 5/22/25 5/22/26  Jono Rutherford MD   ENTRESTO 24-26 mg per tablet Take 1 tablet by mouth 2 (two) times daily. 4/12/22  Yes Provider, Historical   furosemide (LASIX) 20 MG tablet Take 20 mg by mouth every morning. 7/16/22  Yes Provider, Historical   levalbuterol (XOPENEX HFA) 45 mcg/actuation inhaler See Instructions, INHALE 2 PUFFS BY MOUTH EVERY FOUR (4) HOURS AS NEEDED FOR WHEEZING, # 15 cap(s), 5 Refill(s), Pharmacy: [facility], 167, cm, Height/Length Dosing, 02/14/21 14:28:00 CST, 74, kg, Weight Dosing, 02/14/21 14:28:00 CST 3/25/25  Yes Obey Black MD   levothyroxine (SYNTHROID) 50 MCG tablet Take 50 mcg by mouth once daily. 4/8/22  Yes Provider, Historical   loratadine (CLARITIN) 10 mg tablet Take 10 mg by mouth once daily. 7/16/22  Yes Provider,  Historical   mv-min-folic-K1-lycopen-lutein (CENTRUM SILVER MEN) 613--300 mcg Tab Take 1 tablet by mouth once daily.   Yes Provider, Historical   nitroGLYCERIN (NITROSTAT) 0.4 MG SL tablet Place 0.4 mg under the tongue every 5 (five) minutes as needed for Chest pain.  Patient not taking: Reported on 4/16/2025    Provider, Historical   olopatadine (PATANOL) 0.1 % ophthalmic solution Place 1-2 drops into both eyes once daily.  Patient not taking: Reported on 6/3/2025    Provider, Historical   omeprazole (PRILOSEC OTC) 20 MG tablet Take 20 mg by mouth once daily.   Yes Provider, Historical   pantoprazole (PROTONIX) 40 MG tablet Take 1 tablet (40 mg total) by mouth once daily. 4/15/25 5/15/25  Raj Noe, AGACNP-BC   predniSONE (DELTASONE) 20 MG tablet Take 2 tablets (40 mg total) by mouth once daily. for 5 days 6/2/25 6/7/25 Yes Mariangel Verdugo MD   tadalafiL (CIALIS) 5 MG tablet Take 5 mg by mouth daily as needed. 2/10/25  Yes Provider, Historical   TRELEGY ELLIPTA 100-62.5-25 mcg DsDv Inhale 1 puff into the lungs once daily. 6/18/24  Yes Obey Black MD       ALLERGIES   Aspirin and Metoprolol          REVIEW OF SYSTEMS   Except as documented above, all other systems reviewed and negative    PHYSICAL EXAM     Vitals:    06/04/25 0728   BP:    Pulse: 76   Resp: 18   Temp:       General:  In no acute distress, resting comfortably  Head and neck:  Atraumatic, normocephalic, moist mucous membranes, supple neck  Chest:  wheezes g.l   Heart:  S1, S2, no appreciable murmur  Abdomen:  Soft, nontender, BS +  MSK:  Warm, no lower extremity edema, no clubbing or cyanosis  Neuro:  Alert and oriented x4, moving all extremities with good strength  Integumentary:  No obvious skin rash  Psychiatry:  Appropriate mood and affect          ASSESSMENT AND PLAN     Active Hospital Problems    Diagnosis  POA    *COPD exacerbation [J44.1]  Yes      Resolved Hospital Problems   No resolved problems to display.     COPD/Asthma Exacerbation  Persistent cough and nasal congestion for 4 days with minimal response to outpatient steroids and antibiotics.  Initiated:  Albuterol-Ipratropium nebs Q6H scheduled and PRN  Budesonide nebulizer BID  IV methylprednisolone 60 mg Q6H  Levofloxacin 750 mg IV daily x 5 days (broad coverage for potential bacterial bronchitis or pneumonia)  Droplet precautions in place  Continuous O2 with titration to maintain SpO? >= 90%  Incentive spirometry Q4H while awake  Continuous pulse oximetry  Respiratory culture and RSV testing sent  Monitor for wheezing, increased work of breathing, or hypoxia  Plan to de-escalate to oral therapy if clinical improvement  Patient remains short of breath. Will obtain CT chest without contrast for further evaluation. Initiating acetylcysteine (Mucomyst) and obtaining an arterial blood gas. Pulmonary Telemedicine consultation has been requested for further management recommendations.  History of Atrial Fibrillation on Amiodarone  Amiodarone 200 mg PO daily continued  Monitor QT interval, HR; cardiac monitoring in place  Avoid macrolides and other QT-prolonging agents  History of watchman procedure.   CHF with HFrEF (on sacubitril-valsartan)  Continue current home regimen including sacubitril-valsartan 24/26 mg BID and furosemide 20 mg daily  Monitor fluid status, daily weights, electrolytes  Maintain BP parameters to avoid hypotension  CAD/Post-MI  Continue aspirin 81 mg (noted GI intolerance, monitor for symptoms)  Continue clopidogrel 75 mg daily  Continue atorvastatin 40 mg nightly  DVT Prophylaxis  Enoxaparin 40 mg SC daily initiated  SCDs placed  GERD/Peptic Ulcer Disease  Pantoprazole 40 mg PO daily  Antacid PRN (aluminum-magnesium-simethicone)  Thyroid Disease  Continue levothyroxine 50 mcg PO daily  Other Comorbidities:  BPH ? Tamsulosin 0.4 mg PO daily continued  Hyperlipidemia ? Continue statin  Obesity, osteoarthritis ? PT/OT to evaluate and treat  History of  insomnia ? Melatonin 9 mg PRN at bedtime  Pain Management  Acetaminophen 650 mg PO Q4H PRN mild pain/fever  Hydrocodone-APAP PRN moderate pain  Morphine IV PRN for severe pain  Naloxone ordered PRN for reversal  Diabetes Monitoring  POCT glucose 4x/day  Insulin aspart sliding scale PRN  Nutrition  Heart healthy diet initiated  Multivitamin PO daily  Code Status: Full  Patient is alert, oriented, and capable of participating in medical decision-making  Disposition Plan  Observation admission; will reassess response to therapy within 24-48 hours  Will consider stepdown to oral steroids/bronchodilators as tolerated  Discharge planning contingent on clinical improvement and oxygen needs      DVT prophylaxis:  Lovenox  __________________________________________________________________  LABS/MICRO/MEDS/DIAGNOSTICS       LABS  Recent Labs     06/04/25  0604      K 4.5   CO2 30   BUN 19.9   CREATININE 0.64*   CALCIUM 8.5*   ALKPHOS 60   AST 25   ALT 15   ALBUMIN 3.1*     Recent Labs     06/04/25  0606   WBC 15.54*   RBC 3.75*   HCT 36.2*   MCV 96.5*          MICROBIOLOGY  Microbiology Results (last 7 days)       Procedure Component Value Units Date/Time    Respiratory Culture [[id number]] Collected: 06/03/25 1316    Order Status: Sent Specimen: Sputum, Expectorated Updated: 06/03/25 1316            MEDICATIONS   acetylcysteine 200 mg/ml (20%)  4 mL Nebulization Q4H WAKE    albuterol-ipratropium  3 mL Nebulization Q4H    amiodarone  200 mg Oral Daily    aspirin  81 mg Oral Daily    atorvastatin  40 mg Oral QHS    budesonide  0.5 mg Nebulization Q12H    cetirizine  10 mg Oral Daily    clopidogreL  75 mg Oral Daily    enoxparin  40 mg Subcutaneous Daily    furosemide  20 mg Oral QAM    Lactobacillus acidophilus  2 capsule Oral TID WM    levoFLOXacin  750 mg Intravenous Q24H    levothyroxine  50 mcg Oral Daily    methylPREDNISolone injection (PEDS and ADULTS)  60 mg Intravenous Q6H    multivitamin  1 tablet Oral  Daily    pantoprazole  40 mg Oral Daily    sacubitriL-valsartan  1 tablet Oral BID    tamsulosin  0.4 mg Oral Daily      INFUSIONS      DIAGNOSTIC TESTS  X-Ray Chest AP Portable   Final Result      No acute cardiopulmonary process identified.         Electronically signed by: Raul Chandlerahim   Date:    06/03/2025   Time:    10:07      CT Chest Without Contrast    (Results Pending)          Patient information was obtained from patient, patient's family, past medical records and ER records.   All diagnosis and differential diagnosis have been reviewed; assessment and plan has been documented. I have personally reviewed the labs and test results that are presently available; I have reviewed the patients medication list. I have reviewed the consulting providers response and recommendations. I have reviewed or attempted to review medical records based upon their availability.  All of the patient's questions have been addressed and answered. Patient's is agreeable to the above stated plan. I will continue to monitor closely and make adjustments to medical management as needed.  This note was created using [employer] voice recognition software that occasionally misinterpreted phrases or words.  Please contact me if any questions may rise regarding documentation to clarify verbiage.        Deepti Carmona MD   Internal Medicine  Department of Hospital Medicine  [facility]         [1]   Social History  Socioeconomic History    Marital status:    Tobacco Use    Smoking status: Former     Current packs/day: 1.00     Average packs/day: 1 pack/day for 20.0 years (20.0 ttl pk-yrs)     Types: Cigarettes    Smokeless tobacco: Never    Tobacco comments:     Quit 30 years ago   Substance and Sexual Activity    Alcohol use: Yes     Alcohol/week: 1.0 standard drink of alcohol     Types: 1 Cans of beer per week     Comment: 1 beer a week    Drug use: Never    Sexual activity: Not Currently

## 2025-06-04 NOTE — PLAN OF CARE
Problem: Adult Inpatient Plan of Care  Goal: Plan of Care Review  Outcome: Progressing  Flowsheets (Taken 6/4/2025 1001)  Plan of Care Reviewed With: patient  Goal: Patient-Specific Goal (Individualized)  Outcome: Progressing  Flowsheets (Taken 6/4/2025 1001)  Individualized Care Needs: ABGS, IV ABX, 02 AS NEEDED, CHEST XRAY  Anxieties, Fears or Concerns: c/o increased SOB     Problem: Infection  Goal: Absence of Infection Signs and Symptoms  Outcome: Progressing  Intervention: Prevent or Manage Infection  Flowsheets (Taken 6/4/2025 1001)  Fever Reduction/Comfort Measures:   lightweight bedding   lightweight clothing  Infection Management: aseptic technique maintained  Isolation Precautions:   precautions maintained   droplet     Problem: Fall Injury Risk  Goal: Absence of Fall and Fall-Related Injury  Outcome: Progressing  Intervention: Identify and Manage Contributors  Flowsheets (Taken 6/4/2025 1001)  Self-Care Promotion:   independence encouraged   adaptive equipment use encouraged   BADL personal objects within reach   BADL personal routines maintained   meal set-up provided  Medication Review/Management: medications reviewed  Intervention: Promote Injury-Free Environment  Flowsheets (Taken 6/4/2025 1001)  Safety Promotion/Fall Prevention:   assistive device/personal item within reach   bed alarm set   side rails raised x 2   instructed to call staff for mobility

## 2025-06-04 NOTE — CONSULTS
Inpatient consult to Cardiology  Consult performed by: Carmen Borrero FNP  Consult ordered by: Deepti Carmona MD  Reason for consult: SOB      [facility]    Cardiology  Consult Note    Patient Name: Babak Scales  MRN: [id number]  Admission Date: 6/3/2025  Hospital Length of Stay: 0 days  Code Status: Full Code   Attending Provider: Deepti Carmona MD   Consulting Provider: TIFFANIE Birmingham  Primary Care Physician: Guillermo Brown Jr., MD  Principal Problem:COPD exacerbation    Patient information was obtained from patient, past medical records, ER records, and primary team.     Subjective:     Chief Complaint/Reason for Consult: SOB    HPI: Patient is a 79 year old male known to [facility], Dr. Gil and Dr Rutherford with PMHx Atrial fibrillation (s/p Watchman), CAD, CHF, COPD, GERD, bladder CA, Stroke, thyroid disease who presented to ED with c/o of nasal congestion and cough x 4 days.    [facility] is consulted for SOB.    PMH: Atrial fibrillation (s/p Watchman), CAD, CHF, COPD, GERD, bladder CA, Stroke, thyroid disease   PSH: Afib ablation, bladder surgery, cervical fusion, cholecystectoy, Watchman, colonoscopy, EGD, hemorrhoidectomy, fusion of sacroiliac joint, PPM, cataract  Family History: Non-contributory  Social History: Former tobacco user; occasional EtOH    Previous Cardiac Diagnostics:   KIRBY 5.22.2025:  Left Ventricle: The left ventricle is mildly dilated. Unable to assess wall motion. There is unable to assess systolic function. There is diastolic dysfunction.  Right Ventricle: The right ventricle is normal in size Systolic function is normal.  Left Atrium: The left atrium is dilated A patent foramen ovale is visualized with predominant left to right shunting indicated by color flow Doppler. There is a closure device within the appendage. The device is a Watchman. There is complete occlusion with no residual leaks. There is no thrombus in the left atrial appendage.  Right Atrium:  The right atrium is dilated.    Echocardiogram 4.15.2025:  Left Ventricle: The left ventricle is dilated. Normal wall thickness. Moderate global hypokinesis present. There is moderately reduced systolic function with a visually estimated ejection fraction of 35 - 40%.  Right Ventricle: Right ventricular enlargement. Systolic function is normal. TAPSE is 2.82 cm. Pacemaker lead present in the ventricle.  Left Atrium: dilated  Right Atrium: Right atrium is dilated.  Mitral Valve: There is mild regurgitation.  Tricuspid Valve: There is mild to moderate regurgitation.  IVC/SVC: Intermediate venous pressure at 8 mmHg.          Review of patient's allergies indicates:   Allergen Reactions    Aspirin      GI distress with med, hx gastric ulcer    Metoprolol      Current Facility-Administered Medications on File Prior to Encounter   Medication    0.9%  NaCl infusion    sodium chloride 0.9% flush 10 mL     Current Outpatient Medications on File Prior to Encounter   Medication Sig    albuterol (VENTOLIN HFA) 90 mcg/actuation inhaler Inhale 2 puffs into the lungs every 6 (six) hours as needed for Wheezing. Rescue    alfuzosin (UROXATRAL) 10 mg Tb24 Take 10 mg by mouth once daily.    amiodarone (PACERONE) 200 MG Tab Take 1 tablet by mouth Daily.    aspirin (ECOTRIN) 81 MG EC tablet Take 1 tablet (81 mg total) by mouth once daily.    atorvastatin (LIPITOR) 40 MG tablet Take 40 mg by mouth every evening.    azithromycin (Z-ADIS) 250 MG tablet Take 1 tablet (250 mg total) by mouth once daily.    clopidogreL (PLAVIX) 75 mg tablet Take 1 tablet (75 mg total) by mouth once daily.    furosemide (LASIX) 20 MG tablet Take 20 mg by mouth every morning.    levalbuterol (XOPENEX HFA) 45 mcg/actuation inhaler See Instructions, INHALE 2 PUFFS BY MOUTH EVERY FOUR (4) HOURS AS NEEDED FOR WHEEZING, # 15 cap(s), 5 Refill(s), Pharmacy: [facility], 167, cm, Height/Length Dosing, 02/14/21 14:28:00 CST, 74, kg, Weight Dosing, 02/14/21 14:28:00  CST    levothyroxine (SYNTHROID) 50 MCG tablet Take 50 mcg by mouth once daily.    loratadine (CLARITIN) 10 mg tablet Take 10 mg by mouth once daily.    mv-min-folic-K1-lycopen-lutein (CENTRUM SILVER MEN) 961--300 mcg Tab Take 1 tablet by mouth once daily.    omeprazole (PRILOSEC OTC) 20 MG tablet Take 20 mg by mouth once daily.    predniSONE (DELTASONE) 20 MG tablet Take 2 tablets (40 mg total) by mouth once daily. for 5 days    tadalafiL (CIALIS) 5 MG tablet Take 5 mg by mouth daily as needed.    tamsulosin (FLOMAX) 0.4 mg Cap Take 0.4 mg by mouth once daily.    TRELEGY ELLIPTA 100-62.5-25 mcg DsDv Inhale 1 puff into the lungs once daily.    ENTRESTO 24-26 mg per tablet Take 1 tablet by mouth 2 (two) times daily. (Patient not taking: Reported on 6/3/2025)    nitroGLYCERIN (NITROSTAT) 0.4 MG SL tablet Place 0.4 mg under the tongue every 5 (five) minutes as needed for Chest pain. (Patient not taking: Reported on 4/16/2025)    olopatadine (PATANOL) 0.1 % ophthalmic solution Place 1-2 drops into both eyes once daily. (Patient not taking: Reported on 6/3/2025)    pantoprazole (PROTONIX) 40 MG tablet Take 1 tablet (40 mg total) by mouth once daily.             Review of Systems   Respiratory:  Positive for cough and shortness of breath.    All other systems reviewed and are negative.    Objective:     Vital Signs (Most Recent):  Temp: 97.7 °F (36.5 °C) (06/04/25 0723)  Pulse: 67 (06/04/25 0853)  Resp: 18 (06/04/25 0853)  BP: 114/70 (06/04/25 0723)  SpO2: 95 % (06/04/25 0853) Vital Signs (24h Range):  Temp:  [97.7 °F (36.5 °C)-98.4 °F (36.9 °C)] 97.7 °F (36.5 °C)  Pulse:  [] 67  Resp:  [18-20] 18  SpO2:  [88 %-98 %] 95 %  BP: (104-131)/(62-89) 114/70   Weight: 91.1 kg (200 lb 13.4 oz)  Body mass index is 28.82 kg/m².  SpO2: 95 %       Intake/Output Summary (Last 24 hours) at 6/4/2025 0951  Last data filed at 6/4/2025 0648  Gross per 24 hour   Intake 360 ml   Output 3 ml   Net 357 ml     Lines/Drains/Airways   "     Peripheral Intravenous Line  Duration                  Peripheral IV - Single Lumen 06/03/25 0942 20 G Anterior;Proximal;Right Forearm 1 day                  Significant Labs:   Chemistries:   Recent Labs   Lab 06/02/25  0138 06/03/25  0954 06/03/25  0957 06/04/25  0604     --  140 140   K 3.7  --  3.9 4.5     --  104 105   CO2 26  --  27 30   BUN 16.6  --  19.7 19.9   CREATININE 0.67*  --  0.70* 0.64*   CALCIUM 8.9  --  9.0 8.5*   PROT 7.1  --  7.6 6.5   BILITOT 1.6*  --  1.1 1.0   ALKPHOS 80  --  69 60   ALT 11  --  13 15   AST 17  --  24 25   MG  --   --   --  2.30   PHOS  --   --   --  2.8   TROPONINI 0.027 0.030  --   --         CBC/Anemia Labs: Coags:    Recent Labs   Lab 06/02/25 0138 06/03/25  0954 06/04/25  0606   WBC 14.87* 19.61* 15.54*   HGB 12.8* 12.9* 11.9*   HCT 38.7* 38.9* 36.2*    205 184   MCV 96.3* 96.0* 96.5*   RDW 14.4 14.7 14.8    No results for input(s): "PT", "INR", "APTT" in the last 168 hours.     Significant Imaging:  Imaging Results              X-Ray Chest AP Portable (Final result)  Result time 06/03/25 10:07:36      Final result by Raul Cordero MD (06/03/25 10:07:36)                   Impression:      No acute cardiopulmonary process identified.      Electronically signed by: Raul Cordero  Date:    06/03/2025  Time:    10:07               Narrative:    EXAMINATION:  XR CHEST AP PORTABLE    CLINICAL HISTORY:  cough;    TECHNIQUE:  One view    COMPARISON:  June 2, 2025.    FINDINGS:  Cardiopericardial silhouette enlarged appearance similar.  Cardiac device electrodes terminate within the right atrium and the right ventricle.  No convincing acute dense focal or segmental consolidation, congestive process, pleural effusions or pneumothorax.                                    EKG:     Telemetry:  Afib CVR    Physical Exam  Vitals reviewed.   Constitutional:       Appearance: Normal appearance.   Cardiovascular:      Rate and Rhythm: Normal rate. Rhythm " irregular.      Pulses: Normal pulses.      Heart sounds: Normal heart sounds.   Pulmonary:      Breath sounds: Wheezing and rhonchi present.   Musculoskeletal:         General: Normal range of motion.      Right lower leg: No edema.      Left lower leg: No edema.   Skin:     General: Skin is warm and dry.   Neurological:      General: No focal deficit present.      Mental Status: He is alert and oriented to person, place, and time.       Home Medications:   Medications Ordered Prior to Encounter[1]  Current Schedule Inpatient Medications:   acetylcysteine 200 mg/ml (20%)  4 mL Nebulization Q4H WAKE    albuterol-ipratropium  3 mL Nebulization Q4H    amiodarone  200 mg Oral Daily    aspirin  81 mg Oral Daily    atorvastatin  40 mg Oral QHS    budesonide  0.5 mg Nebulization Q12H    cetirizine  10 mg Oral Daily    clopidogreL  75 mg Oral Daily    enoxparin  40 mg Subcutaneous Daily    furosemide  20 mg Oral QAM    Lactobacillus acidophilus  2 capsule Oral TID WM    levoFLOXacin  750 mg Intravenous Q24H    levothyroxine  50 mcg Oral Daily    methylPREDNISolone injection (PEDS and ADULTS)  60 mg Intravenous Q6H    multivitamin  1 tablet Oral Daily    pantoprazole  40 mg Oral Daily    sacubitriL-valsartan  1 tablet Oral BID    tamsulosin  0.4 mg Oral Daily     Continuous Infusions:    Assessment:   CHF with HFrEF  --on Entresto 24-26mg BID and furosemide 20 mg daily  --EF 35-40% (Echo 4/2025)  Atrial Fibrillation  --s/p Watchman and ablation (4/14/2025)  --on amiodarone 200 mg daily  --on ASA 81 mg and Clopidogrel 75 mg daily  CAD  HLD  COPD exacerbation  Type II DM  GERD  Thyroid disease    Plan:   Agree with echocardiogram today  Continue all medications as currently prescribed  Continue ASA 81 mg daily and Plavix 75 mg daily at discharge  Accurate I&O  Monitor daily weights  Keep K >4.0 and Mg >2.0    Keep scheduled follow-up appt with Dr. Rutherford on 6/18/2025 @ 3:00 pm at [facility]  [address]    Patient meets ASPEN criteria for   malnutrition of   per RD assessment as evidenced by:                       A minimum of two characteristics is recommended for diagnosis of either severe or non-severe malnutrition.    Thank you for your consult.     TIFFANIE Birmingham  Cardiology  [facility]          [1]   Current Facility-Administered Medications on File Prior to Encounter   Medication Dose Route Frequency Provider Last Rate Last Admin    0.9%  NaCl infusion   Intravenous Once Jono Rutherford MD        sodium chloride 0.9% flush 10 mL  10 mL Intravenous PRN Jono Rutherford MD         Current Outpatient Medications on File Prior to Encounter   Medication Sig Dispense Refill    albuterol (VENTOLIN HFA) 90 mcg/actuation inhaler Inhale 2 puffs into the lungs every 6 (six) hours as needed for Wheezing. Rescue 18 g 11    alfuzosin (UROXATRAL) 10 mg Tb24 Take 10 mg by mouth once daily.      amiodarone (PACERONE) 200 MG Tab Take 1 tablet by mouth Daily.      aspirin (ECOTRIN) 81 MG EC tablet Take 1 tablet (81 mg total) by mouth once daily. 360 tablet 0    atorvastatin (LIPITOR) 40 MG tablet Take 40 mg by mouth every evening.      azithromycin (Z-ADIS) 250 MG tablet Take 1 tablet (250 mg total) by mouth once daily. 6 tablet 0    clopidogreL (PLAVIX) 75 mg tablet Take 1 tablet (75 mg total) by mouth once daily. 30 tablet 6    furosemide (LASIX) 20 MG tablet Take 20 mg by mouth every morning.      levalbuterol (XOPENEX HFA) 45 mcg/actuation inhaler See Instructions, INHALE 2 PUFFS BY MOUTH EVERY FOUR (4) HOURS AS NEEDED FOR WHEEZING, # 15 cap(s), 5 Refill(s), Pharmacy: [facility], 167, cm, Height/Length Dosing, 02/14/21 14:28:00 CST, 74, kg, Weight Dosing, 02/14/21 14:28:00 CST 15 g 11    levothyroxine (SYNTHROID) 50 MCG tablet Take 50 mcg by mouth once daily.      loratadine (CLARITIN) 10 mg tablet Take 10 mg by mouth once daily.      mv-min-folic-K1-lycopen-lutein (CENTRUM SILVER MEN)  078--300 mcg Tab Take 1 tablet by mouth once daily.      omeprazole (PRILOSEC OTC) 20 MG tablet Take 20 mg by mouth once daily.      predniSONE (DELTASONE) 20 MG tablet Take 2 tablets (40 mg total) by mouth once daily. for 5 days 10 tablet 0    tadalafiL (CIALIS) 5 MG tablet Take 5 mg by mouth daily as needed.      tamsulosin (FLOMAX) 0.4 mg Cap Take 0.4 mg by mouth once daily.      TRELEGY ELLIPTA 100-62.5-25 mcg DsDv Inhale 1 puff into the lungs once daily. 60 each 11    ENTRESTO 24-26 mg per tablet Take 1 tablet by mouth 2 (two) times daily. (Patient not taking: Reported on 6/3/2025)      nitroGLYCERIN (NITROSTAT) 0.4 MG SL tablet Place 0.4 mg under the tongue every 5 (five) minutes as needed for Chest pain. (Patient not taking: Reported on 4/16/2025)      olopatadine (PATANOL) 0.1 % ophthalmic solution Place 1-2 drops into both eyes once daily. (Patient not taking: Reported on 6/3/2025)      pantoprazole (PROTONIX) 40 MG tablet Take 1 tablet (40 mg total) by mouth once daily. 30 tablet 0

## 2025-06-04 NOTE — CONSULTS
[facility]  Pulmonary Telemedicine Consult    Patient Name: Babak Scales  MRN: [id number]  Admission Date: 6/3/2025  Hospital Length of Stay: 0 days  Code Status: Full Code  Attending Provider: Deepti Carmona MD  Primary Care Provider: Guillermo Brown Jr., MD     Subjective:     HPI:   This is a 79-year-old male with medical problems of GOLD 4E COPD (follows with Dr. Black), coronary artery disease, atrial fibrillation, bladder cancer, and systolic heart failure who was admitted to [facility] on 06/03/2025 for a COPD exacerbation.    He had sinus congestion which moved down to this chest.  He describes runny nose, chills, and sinus congestion.  No sick contacts.  He has chest tightness and shortness of breath.  He was given oxygen on admission.  Chest CT showed some nodularity in the right lower lobe and a new 9 mm nodule in the left lower lobe.  He was started on IV antibiotics and steroids with some improvement.    FH: no family history of lung disease  SH: Smoked 1 ppd until he was 44. Smoked for 25 years.       24 Hour Interval History:  At the time of my evaluation, he is sitting up comfortably in bed.  He is off supplemental oxygen with a saturation of 90%.  He reports feeling somewhat better than he was.    Past Medical History:   Diagnosis Date    Arthritis     Asthma     Atrial fibrillation     xarelto    Bladder cancer     Bladder disorder     CAD (coronary artery disease)     CHF (congestive heart failure)     COPD (chronic obstructive pulmonary disease)     Diverticulitis     Enlarged prostate     GERD (gastroesophageal reflux disease)     Heart damage     History of kidney stones     HLD (hyperlipidemia)     [place] (hard of hearing)     Insomnia     Obesity     Skin cancer     SOB (shortness of breath)     Stroke     Thyroid disease        Past Surgical History:   Procedure Laterality Date    ABLATION N/A 4/14/2025    Procedure: Ablation;  Surgeon: Krish  Jono WILBURN MD;  Location: [facility] CATH LAB;  Service: Cardiology;  Laterality: N/A;    BLADDER SURGERY      CERVICAL FUSION      CHOLECYSTECTOMY      CLOSURE OF LEFT ATRIAL APPENDAGE USING DEVICE N/A 4/14/2025    Procedure: Left atrial appendage closure device;  Surgeon: Jono Rutherford MD;  Location: [facility] CATH LAB;  Service: Cardiology;  Laterality: N/A;  WATCHMAN W/ KIRBY + AF CATH ABLATION    COLONOSCOPY      ECHOCARDIOGRAM,TRANSESOPHAGEAL N/A 4/14/2025    Procedure: Transesophageal echo (KIRBY) intra-procedure log documentation;  Surgeon: Provider, Dosc Diagnostic;  Location: [facility] CATH LAB;  Service: Cardiology;  Laterality: N/A;    ESOPHAGOGASTRODUODENOSCOPY      EXCISION OF PTERYGIUM Left 06/25/2024    Procedure: EXCISION, PTERYGIUM WITH MMC AND CONJ AUTOGRAFT - OS;  Surgeon: Rohith Crespo MD;  Location: [facility] OR;  Service: Ophthalmology;  Laterality: Left;    EXCISIONAL HEMORRHOIDECTOMY      FUSION OF SACROILIAC JOINT      INSERTION OF PACEMAKER      2021    PHACOEMULSIFICATION, CATARACT, WITH IOL INSERTION Right 08/27/2024    Procedure: PHACOEMULSIFICATION, CATARACT, WITH IOL INSERTION- OD;  Surgeon: Rohith Crespo MD;  Location: [facility] OR;  Service: Ophthalmology;  Laterality: Right;    PHACOEMULSIFICATION, CATARACT, WITH IOL INSERTION Left 09/03/2024    Procedure: PHACOEMULSIFICATION, CATARACT, WITH IOL INSERTION- OS;  Surgeon: Rohith Crespo MD;  Location: [facility] OR;  Service: Ophthalmology;  Laterality: Left;    REPLACEMENT OF IMPLANTABLE CARDIOVERTER-DEFIBRILLATOR (ICD) BATTERY  07/2022    skin cancer removal Right     skin underneath the eye       Social History[1]        Current Outpatient Medications   Medication Instructions    albuterol (VENTOLIN HFA) 90 mcg/actuation inhaler 2 puffs, Inhalation, Every 6 hours PRN, Rescue    alfuzosin (UROXATRAL) 10 mg, Daily    amiodarone (PACERONE) 200 MG Tab 1 tablet, Daily    aspirin (ECOTRIN) 81 mg, Oral, Daily    atorvastatin (LIPITOR) 40 mg, Nightly    azithromycin  (Z-ADIS) 250 mg, Oral, Daily    clopidogreL (PLAVIX) 75 mg, Oral, Daily    ENTRESTO 24-26 mg per tablet 1 tablet, 2 times daily    furosemide (LASIX) 20 mg, Every morning    levalbuterol (XOPENEX HFA) 45 mcg/actuation inhaler   See Instructions, INHALE 2 PUFFS BY MOUTH EVERY FOUR (4) HOURS AS NEEDED FOR WHEEZING, # 15 cap(s), 5 Refill(s), Pharmacy: [facility], 167, cm, Height/Length Dosing, 02/14/21 14:28:00 CST, 74, kg, Weight Dosing, 02/14/21 14:28:00 CST    levothyroxine (SYNTHROID) 50 mcg, Daily    loratadine (CLARITIN) 10 mg, Daily    mv-min-folic-K1-lycopen-lutein (CENTRUM SILVER MEN) 685--300 mcg Tab 1 tablet, Daily    nitroGLYCERIN (NITROSTAT) 0.4 mg, Every 5 min PRN    olopatadine (PATANOL) 0.1 % ophthalmic solution 1-2 drops, Daily    omeprazole (PRILOSEC OTC) 20 mg, Daily    pantoprazole (PROTONIX) 40 mg, Oral, Daily    predniSONE (DELTASONE) 40 mg, Oral, Daily    tadalafiL (CIALIS) 5 mg, Daily PRN    tamsulosin (FLOMAX) 0.4 mg, Daily    TRELEGY ELLIPTA 100-62.5-25 mcg DsDv 1 puff, Inhalation, Daily       Review of patient's allergies indicates:   Allergen Reactions    Aspirin      GI distress with med, hx gastric ulcer    Metoprolol         Current Inpatient Medications   acetylcysteine 200 mg/ml (20%)  4 mL Nebulization Q4H WAKE    albuterol-ipratropium  3 mL Nebulization Q4H    amiodarone  200 mg Oral Daily    aspirin  81 mg Oral Daily    atorvastatin  40 mg Oral QHS    budesonide  0.5 mg Nebulization Q12H    cetirizine  10 mg Oral Daily    clopidogreL  75 mg Oral Daily    enoxparin  40 mg Subcutaneous Daily    furosemide  20 mg Oral QAM    Lactobacillus acidophilus  2 capsule Oral TID WM    levoFLOXacin  750 mg Intravenous Q24H    levothyroxine  50 mcg Oral Daily    methylPREDNISolone injection (PEDS and ADULTS)  60 mg Intravenous Q6H    multivitamin  1 tablet Oral Daily    pantoprazole  40 mg Oral Daily    sacubitriL-valsartan  1 tablet Oral BID    tamsulosin  0.4 mg Oral Daily        Current Intravenous Infusions        Review of Systems   Constitutional:  Positive for chills. Negative for fever and weight loss.   Respiratory:  Positive for cough and shortness of breath.    Cardiovascular:  Negative for chest pain.          Objective:       Intake/Output Summary (Last 24 hours) at 6/4/2025 1054  Last data filed at 6/4/2025 0648  Gross per 24 hour   Intake 360 ml   Output 3 ml   Net 357 ml         Vital Signs (Most Recent):  Temp: 97.7 °F (36.5 °C) (06/04/25 0723)  Pulse: 67 (06/04/25 0853)  Resp: 18 (06/04/25 0853)  BP: 114/70 (06/04/25 0723)  SpO2: 95 % (06/04/25 0853)  Body mass index is 28.82 kg/m².  Weight: 91.1 kg (200 lb 13.4 oz) Vital Signs (24h Range):  Temp:  [97.7 °F (36.5 °C)-98.4 °F (36.9 °C)] 97.7 °F (36.5 °C)  Pulse:  [] 67  Resp:  [18-20] 18  SpO2:  [88 %-98 %] 95 %  BP: (104-131)/(62-89) 114/70     Physical Exam  Well-appearing.  Talks in complete sentences.  Saturating 90% on room air.    Lines/Drains/Airways       Peripheral Intravenous Line  Duration                  Peripheral IV - Single Lumen 06/03/25 0942 20 G Anterior;Proximal;Right Forearm 1 day                    Significant Labs:    Lab Results   Component Value Date    WBC 15.54 (H) 06/04/2025    HGB 11.9 (L) 06/04/2025    HCT 36.2 (L) 06/04/2025    MCV 96.5 (H) 06/04/2025     06/04/2025           BMP  Lab Results   Component Value Date     06/04/2025    K 4.5 06/04/2025    CO2 30 06/04/2025    BUN 19.9 06/04/2025    CREATININE 0.64 (L) 06/04/2025    CALCIUM 8.5 (L) 06/04/2025    AGAP 5.0 06/04/2025    ESTGFRAFRICA 95 01/03/2024    EGFRNONAA >60 03/08/2022         ABG  Recent Labs   Lab 06/04/25  0901   PH 7.345*   PO2 69*   PCO2 48.3*   HCO3 26.4       Mechanical Ventilation Support:  Oxygen Concentration (%): 28 (06/04/25 0728)      Significant Imaging:  I have reviewed the pertinent imaging within the past 24 hours.    PFTs 11/2024      Assessment/Plan:     Assessment  GOLD 3E COPD  RLL  nodularity, likely representing viral versus bacterial pneumonia  New 9 mm LLL nodule  Tobacco use totaling 25 pack-years      Plan  Continue IV Levaquin to complete a 7 day course  Continue IV steroids today with steroid taper as follows.  Tomorrow start 40 mg of oral prednisone for 3 days then 30 mg of prednisone for 3 days then 20 mg of prednisone for 3 days then 10 mg of prednisone for 3 days then off  Follow up sputum culture and respiratory viral panel  Oxygen for sat goal 88-92%.  Performed ambulatory oximetry prior to discharge  Offered referral to pulmonary rehab which he declined  Continue Trelegy and PRN albuterol at discharge  Follow up in pulmonary clinic with Dr. Black in 3 months with repeat chest CT to monitor the LLL nodule    Pulmonary will sign off.        Ernesto Feldman MD  Pulmonary Critical Care Medicine  [facility]  DOS: 06/04/2025          [1]   Social History  Socioeconomic History    Marital status:    Tobacco Use    Smoking status: Former     Current packs/day: 1.00     Average packs/day: 1 pack/day for 20.0 years (20.0 ttl pk-yrs)     Types: Cigarettes    Smokeless tobacco: Never    Tobacco comments:     Quit 30 years ago   Substance and Sexual Activity    Alcohol use: Yes     Alcohol/week: 1.0 standard drink of alcohol     Types: 1 Cans of beer per week     Comment: 1 beer a week    Drug use: Never    Sexual activity: Not Currently

## 2025-06-05 LAB
POCT GLUCOSE: 126 MG/DL (ref 70–110)
POCT GLUCOSE: 165 MG/DL (ref 70–110)
POCT GLUCOSE: 167 MG/DL (ref 70–110)

## 2025-06-05 PROCEDURE — 25000003 PHARM REV CODE 250: Performed by: PHYSICIAN ASSISTANT

## 2025-06-05 PROCEDURE — 94760 N-INVAS EAR/PLS OXIMETRY 1: CPT

## 2025-06-05 PROCEDURE — 99900035 HC TECH TIME PER 15 MIN (STAT)

## 2025-06-05 PROCEDURE — 25000003 PHARM REV CODE 250: Performed by: INTERNAL MEDICINE

## 2025-06-05 PROCEDURE — 27000207 HC ISOLATION

## 2025-06-05 PROCEDURE — 25000242 PHARM REV CODE 250 ALT 637 W/ HCPCS: Performed by: PHYSICIAN ASSISTANT

## 2025-06-05 PROCEDURE — 11000001 HC ACUTE MED/SURG PRIVATE ROOM

## 2025-06-05 PROCEDURE — 21400001 HC TELEMETRY ROOM

## 2025-06-05 PROCEDURE — 63600175 PHARM REV CODE 636 W HCPCS: Performed by: FAMILY MEDICINE

## 2025-06-05 PROCEDURE — 63600175 PHARM REV CODE 636 W HCPCS: Mod: JZ,TB | Performed by: PHYSICIAN ASSISTANT

## 2025-06-05 PROCEDURE — 25000242 PHARM REV CODE 250 ALT 637 W/ HCPCS: Performed by: INTERNAL MEDICINE

## 2025-06-05 PROCEDURE — 27000221 HC OXYGEN, UP TO 24 HOURS

## 2025-06-05 PROCEDURE — 94640 AIRWAY INHALATION TREATMENT: CPT

## 2025-06-05 RX ORDER — PREDNISONE 20 MG/1
40 TABLET ORAL DAILY
Status: COMPLETED | OUTPATIENT
Start: 2025-06-05 | End: 2025-06-07

## 2025-06-05 RX ADMIN — IPRATROPIUM BROMIDE AND ALBUTEROL SULFATE 3 ML: 2.5; .5 SOLUTION RESPIRATORY (INHALATION) at 03:06

## 2025-06-05 RX ADMIN — IPRATROPIUM BROMIDE AND ALBUTEROL SULFATE 3 ML: 2.5; .5 SOLUTION RESPIRATORY (INHALATION) at 07:06

## 2025-06-05 RX ADMIN — ASPIRIN 81 MG: 81 TABLET, COATED ORAL at 08:06

## 2025-06-05 RX ADMIN — LEVOTHYROXINE SODIUM 50 MCG: 50 TABLET ORAL at 08:06

## 2025-06-05 RX ADMIN — FUROSEMIDE 20 MG: 20 TABLET ORAL at 06:06

## 2025-06-05 RX ADMIN — Medication 2 CAPSULE: at 08:06

## 2025-06-05 RX ADMIN — SACUBITRIL AND VALSARTAN 1 TABLET: 24; 26 TABLET, FILM COATED ORAL at 08:06

## 2025-06-05 RX ADMIN — INSULIN ASPART 2 UNITS: 100 INJECTION, SOLUTION INTRAVENOUS; SUBCUTANEOUS at 06:06

## 2025-06-05 RX ADMIN — INSULIN ASPART 1 UNITS: 100 INJECTION, SOLUTION INTRAVENOUS; SUBCUTANEOUS at 09:06

## 2025-06-05 RX ADMIN — BUDESONIDE 0.5 MG: 0.5 INHALANT RESPIRATORY (INHALATION) at 07:06

## 2025-06-05 RX ADMIN — GUAIFENESIN AND CODEINE PHOSPHATE 5 ML: 100; 10 SOLUTION ORAL at 06:06

## 2025-06-05 RX ADMIN — ACETYLCYSTEINE 4 ML: 200 INHALANT RESPIRATORY (INHALATION) at 12:06

## 2025-06-05 RX ADMIN — METHYLPREDNISOLONE SODIUM SUCCINATE 60 MG: 125 INJECTION, POWDER, FOR SOLUTION INTRAMUSCULAR; INTRAVENOUS at 06:06

## 2025-06-05 RX ADMIN — LEVOFLOXACIN 750 MG: 750 INJECTION, SOLUTION INTRAVENOUS at 12:06

## 2025-06-05 RX ADMIN — CETIRIZINE HYDROCHLORIDE 10 MG: 10 TABLET, FILM COATED ORAL at 08:06

## 2025-06-05 RX ADMIN — TAMSULOSIN HYDROCHLORIDE 0.4 MG: 0.4 CAPSULE ORAL at 08:06

## 2025-06-05 RX ADMIN — IPRATROPIUM BROMIDE AND ALBUTEROL SULFATE 3 ML: 2.5; .5 SOLUTION RESPIRATORY (INHALATION) at 12:06

## 2025-06-05 RX ADMIN — PREDNISONE 40 MG: 20 TABLET ORAL at 09:06

## 2025-06-05 RX ADMIN — MELATONIN TAB 3 MG 9 MG: 3 TAB at 09:06

## 2025-06-05 RX ADMIN — ACETYLCYSTEINE 4 ML: 200 INHALANT RESPIRATORY (INHALATION) at 07:06

## 2025-06-05 RX ADMIN — THERA TABS 1 TABLET: TAB at 08:06

## 2025-06-05 RX ADMIN — Medication 2 CAPSULE: at 12:06

## 2025-06-05 RX ADMIN — GUAIFENESIN AND CODEINE PHOSPHATE 5 ML: 100; 10 SOLUTION ORAL at 10:06

## 2025-06-05 RX ADMIN — PANTOPRAZOLE SODIUM 40 MG: 40 TABLET, DELAYED RELEASE ORAL at 08:06

## 2025-06-05 RX ADMIN — ATORVASTATIN CALCIUM 40 MG: 40 TABLET, FILM COATED ORAL at 08:06

## 2025-06-05 RX ADMIN — GUAIFENESIN AND CODEINE PHOSPHATE 5 ML: 100; 10 SOLUTION ORAL at 08:06

## 2025-06-05 RX ADMIN — CLOPIDOGREL BISULFATE 75 MG: 75 TABLET ORAL at 08:06

## 2025-06-05 RX ADMIN — IPRATROPIUM BROMIDE AND ALBUTEROL SULFATE 3 ML: 2.5; .5 SOLUTION RESPIRATORY (INHALATION) at 11:06

## 2025-06-05 RX ADMIN — AMIODARONE HYDROCHLORIDE 200 MG: 200 TABLET ORAL at 04:06

## 2025-06-05 NOTE — PROGRESS NOTES
Hospital Medicine  Progress Note    Patient Name: Babak Scales  MRN: [id number]  Status: IP- Inpatient   Admission Date: 6/3/2025  Length of Stay: 1  Date of Service: 06/05/2025       CC: hospital follow-up for        SUBJECTIVE   Code Status: Full  Social drivers: I have screed the patient for housing insecurity, food insecurity, access to follow up appointments as well as issues with safety at home and have not identified them at this time but will address as needs arise.      Mr. Babak Scales is a 79-year-old male presenting to the emergency department with persistent nasal congestion and cough for the past 4 days. He was evaluated in the ED yesterday for similar symptoms and discharged with a prescription for oral corticosteroids and antibiotics; however, he reports no clinical improvement and returns today due to ongoing discomfort. He denies chest pain, fever, or chills. Given the lack of response to outpatient therapy, he is being admitted under observation status for management of a presumed COPD/asthma exacerbation.     Today: Patient remains short of breath. Will obtain CT chest without contrast for further evaluation. Initiating acetylcysteine (Mucomyst) and obtaining an arterial blood gas. Pulmonary Telemedicine consultation has been requested for further management recommendations. ECHO also ordered as well as CIS consultation.     6/5/2025  Sob better today       Today: Patient seen and examined at bedside, and chart reviewed.       MEDICATIONS   Scheduled   acetylcysteine 200 mg/ml (20%)  4 mL Nebulization Q4H WAKE    albuterol-ipratropium  3 mL Nebulization Q4H    amiodarone  200 mg Oral Daily    aspirin  81 mg Oral Daily    atorvastatin  40 mg Oral QHS    budesonide  0.5 mg Nebulization Q12H    cetirizine  10 mg Oral Daily    clopidogreL  75 mg Oral Daily    enoxparin  40 mg Subcutaneous Daily    furosemide  20 mg Oral QAM    Lactobacillus acidophilus  2 capsule Oral TID WM    levoFLOXacin  750  mg Intravenous Q24H    levothyroxine  50 mcg Oral Daily    multivitamin  1 tablet Oral Daily    pantoprazole  40 mg Oral Daily    predniSONE  40 mg Oral Daily    sacubitriL-valsartan  1 tablet Oral BID    tamsulosin  0.4 mg Oral Daily     Continuous Infusions        PHYSICAL EXAM   VITALS: T 97.5 °F (36.4 °C)   /60   P 83   RR 18   O2 97 %    GENERAL: Awake and in NAD  LUNGS: CTA B/L  CVS: Normal rate  GI/: Soft, NT, bowel sounds positive.  EXTREMITIES: No peripheral edema  NEURO: AAOx3  PSYCH: Cooperative      LABS   CBC  Recent Labs     06/03/25  0954 06/04/25  0606   WBC 19.61* 15.54*   RBC 4.05* 3.75*   HGB 12.9* 11.9*   HCT 38.9* 36.2*   MCV 96.0* 96.5*   MCH 31.9* 31.7*   MCHC 33.2 32.9*   RDW 14.7 14.8    184     CHEM  Recent Labs     06/03/25  0957 06/04/25  0604    140   K 3.9 4.5   CO2 27 30   BUN 19.7 19.9   CREATININE 0.70* 0.64*   CALCIUM 9.0 8.5*   MG  --  2.30   PHOS  --  2.8   ALBUMIN 3.6 3.1*   GLOBULIN 4.0* 3.4   ALKPHOS 69 60   ALT 13 15   AST 24 25   BILITOT 1.1 1.0         MICROBIOLOGY     Microbiology Results (last 7 days)       Procedure Component Value Units Date/Time    Respiratory Culture [[id number]] Collected: 06/03/25 1316    Order Status: Completed Specimen: Sputum, Expectorated Updated: 06/05/25 0726     Respiratory Culture Normal respiratory zuly     GRAM STAIN Quality 3+      Many Gram positive cocci      Rare Gram Negative Rods              DIAGNOSTICS   Echo    Left Ventricle: The left ventricle is mildly dilated. Normal wall   thickness. Moderate global hypokinesis present. There is moderately   reduced systolic function with a visually estimated ejection fraction of   35 - 40%.    Right Ventricle: The right ventricle is normal in size Systolic   function is normal.    Mitral Valve: There is mild regurgitation.    Tricuspid Valve: There is mild regurgitation.    Limited view study.  CT Chest Without Contrast  Narrative: EXAMINATION:  CT CHEST WITHOUT  CONTRAST    CLINICAL HISTORY:  Respiratory illness, nondiagnostic xray;    TECHNIQUE:  Helical acquisition through the chest performed without IV contrast. Three plane reconstructions made available for review.  mGycm. Automatic exposure control, adjustment of mA/kV or iterative reconstruction technique was used to reduce radiation.    COMPARISON:  19 December 2022    FINDINGS:  There is no mediastinal, hilar or axillary lymphadenopathy by size criteria.    Heart is mildly enlarged.  No pericardial effusion.  There are dense coronary artery calcifications.    There is no pleural effusion.  There is some right basilar scarring.  Some clustered small nodules are seen in the right middle and lower lobes images 80-95 series 3.  There is also a 9 mm left lower lobe nodule image 110 series 3.  There is no dense consolidation.  Mild bronchiectasis.    There are no acute findings in the imaged upper abdomen.  There are no acute osseous findings.  Impression: Scattered nodular opacities favored infectious or inflammatory.  Given the left lower lobe nodule measures 9 mm recommend a follow-up chest CT in 3 months to document resolution.    Electronically signed by: Luca Mercado  Date:    06/04/2025  Time:    09:50      ASSESSMENT AND PLAN            Active Hospital Problems     Diagnosis   POA    *COPD exacerbation [J44.1]   Yes       Resolved Hospital Problems   No resolved problems to display.    COPD/Asthma Exacerbation  Persistent cough and nasal congestion for 4 days with minimal response to outpatient steroids and antibiotics.  Initiated:  Albuterol-Ipratropium nebs Q6H scheduled and PRN  Budesonide nebulizer BID  Change to prednisone 40 mg x 3 days and cont taper   Levofloxacin 750 mg IV daily x 5 days (broad coverage for potential bacterial bronchitis or pneumonia)  Droplet precautions in place  Continuous O2 with titration to maintain SpO? >= 90%  Incentive spirometry Q4H while awake  Continuous pulse  oximetry  Respiratory culture and RSV testing sent  Monitor for wheezing, increased work of breathing, or hypoxia  Plan to de-escalate to oral therapy if clinical improvement  Patient remains short of breath. Will obtain CT chest without contrast for further evaluation. Initiating acetylcysteine (Mucomyst) and obtaining an arterial blood gas. Pulmonary Telemedicine consultation has been requested for further management recommendations.  History of Atrial Fibrillation on Amiodarone  Amiodarone 200 mg PO daily continued  Monitor QT interval, HR; cardiac monitoring in place  Avoid macrolides and other QT-prolonging agents  History of watchman procedure.   CHF with HFrEF (on sacubitril-valsartan)  Continue current home regimen including sacubitril-valsartan 24/26 mg BID and furosemide 20 mg daily  Monitor fluid status, daily weights, electrolytes  Maintain BP parameters to avoid hypotension  CAD/Post-MI  Continue aspirin 81 mg (noted GI intolerance, monitor for symptoms)  Continue clopidogrel 75 mg daily  Continue atorvastatin 40 mg nightly  DVT Prophylaxis  Enoxaparin 40 mg SC daily initiated  SCDs placed  GERD/Peptic Ulcer Disease  Pantoprazole 40 mg PO daily  Antacid PRN (aluminum-magnesium-simethicone)  Thyroid Disease  Continue levothyroxine 50 mcg PO daily  Other Comorbidities:  BPH ? Tamsulosin 0.4 mg PO daily continued  Hyperlipidemia ? Continue statin  Obesity, osteoarthritis ? PT/OT to evaluate and treat  History of insomnia ? Melatonin 9 mg PRN at bedtime  Pain Management  Acetaminophen 650 mg PO Q4H PRN mild pain/fever  Hydrocodone-APAP PRN moderate pain  Morphine IV PRN for severe pain  Naloxone ordered PRN for reversal  Diabetes Monitoring  POCT glucose 4x/day  Insulin aspart sliding scale PRN  Nutrition  Heart healthy diet initiated  Multivitamin PO daily  Code Status: Full  Patient is alert, oriented, and capable of participating in medical decision-making  Disposition Plan  Observation admission; will  reassess response to therapy within 24-48 hours  Will consider stepdown to oral steroids/bronchodilators as tolerated  Discharge planning contingent on clinical improvement and oxygen needs      Prophylaxis: alba Stone MD  [facility] Medicine

## 2025-06-05 NOTE — PLAN OF CARE
Problem: Adult Inpatient Plan of Care  Goal: Plan of Care Review  Outcome: Progressing  Flowsheets (Taken 6/5/2025 0800)  Plan of Care Reviewed With: patient  Goal: Patient-Specific Goal (Individualized)  Outcome: Progressing  Flowsheets (Taken 6/5/2025 0800)  Individualized Care Needs: IV ABX, oxygen therapy, prn cough meds  Anxieties, Fears or Concerns: none expressed at this time  Patient/Family-Specific Goals (Include Timeframe): compeletion of IV abx by d/c  Goal: Absence of Hospital-Acquired Illness or Injury  Outcome: Progressing  Intervention: Prevent Skin Injury  Flowsheets (Taken 6/5/2025 0800)  Body Position: position maintained  Skin Protection:   skin sealant/moisture barrier applied   transparent dressing maintained  Goal: Optimal Comfort and Wellbeing  Outcome: Progressing  Intervention: Monitor Pain and Promote Comfort  Flowsheets (Taken 6/5/2025 0800)  Pain Management Interventions:   care clustered   position adjusted   relaxation techniques promoted   quiet environment facilitated  Goal: Readiness for Transition of Care  Outcome: Progressing     Problem: Infection  Goal: Absence of Infection Signs and Symptoms  Outcome: Progressing     Problem: Fall Injury Risk  Goal: Absence of Fall and Fall-Related Injury  Outcome: Progressing  Intervention: Identify and Manage Contributors  Flowsheets (Taken 6/5/2025 0800)  Self-Care Promotion:   independence encouraged   BADL personal objects within reach   BADL personal routines maintained   meal set-up provided   adaptive equipment use encouraged

## 2025-06-05 NOTE — PLAN OF CARE
06/05/25 0727   Discharge Assessment   Assessment Type Discharge Planning Assessment   Confirmed/corrected address, phone number and insurance Yes   Confirmed Demographics Correct on Facesheet   Source of Information health record   Communicated FREDO with patient/caregiver Date not available/Unable to determine   People in Home spouse   Name(s) of People in Home Sharon Scales (Spouse)  [phone]   Facility Arrived From: Home   Do you expect to return to your current living situation? Yes   Do you have help at home or someone to help you manage your care at home? Yes   Who are your caregiver(s) and their phone number(s)? Sharon Scales (Spouse)  [phone]   Prior to hospitilization cognitive status: Alert/Oriented   Current cognitive status: Alert/Oriented   Walking or Climbing Stairs Difficulty no   Dressing/Bathing Difficulty no   Do you currently have service(s) that help you manage your care at home? No   Do you take prescription medications? Yes   Do you have prescription coverage? Yes   Do you have any problems affording any of your prescribed medications? No   Is the patient taking medications as prescribed? yes   Who is going to help you get home at discharge? Sharon Scales (Spouse)  [phone]   How do you get to doctors appointments? family or friend will provide   Are you on dialysis? No   Do you take coumadin? No   Discharge Plan A Home   Discharge Plan B Home   DME Needed Upon Discharge  none   Discharge Plan discussed with: Patient   Transition of Care Barriers None   Financial Resource Strain   How hard is it for you to pay for the very basics like food, housing, medical care, and heating? Not hard   Housing Stability   In the last 12 months, was there a time when you were not able to pay the mortgage or rent on time? N   At any time in the past 12 months, were you homeless or living in a shelter (including now)? N   Transportation Needs   In the past 12 months, has lack of transportation  kept you from medical appointments or from getting medications? no   In the past 12 months, has lack of transportation kept you from meetings, work, or from getting things needed for daily living? No   Food Insecurity   Within the past 12 months, you worried that your food would run out before you got the money to buy more. Never true   Within the past 12 months, the food you bought just didn't last and you didn't have money to get more. Never true   Stress   Do you feel stress - tense, restless, nervous, or anxious, or unable to sleep at night because your mind is troubled all the time - these days? Not at all   Social Isolation   How often do you feel lonely or isolated from those around you?  Never   Utilities   In the past 12 months has the electric, gas, oil, or water company threatened to shut off services in your home? No

## 2025-06-06 LAB — POCT GLUCOSE: 129 MG/DL (ref 70–110)

## 2025-06-06 PROCEDURE — 25000003 PHARM REV CODE 250: Performed by: PHYSICIAN ASSISTANT

## 2025-06-06 PROCEDURE — 94640 AIRWAY INHALATION TREATMENT: CPT

## 2025-06-06 PROCEDURE — 94760 N-INVAS EAR/PLS OXIMETRY 1: CPT

## 2025-06-06 PROCEDURE — 27000207 HC ISOLATION

## 2025-06-06 PROCEDURE — 25000003 PHARM REV CODE 250: Performed by: INTERNAL MEDICINE

## 2025-06-06 PROCEDURE — 25000242 PHARM REV CODE 250 ALT 637 W/ HCPCS: Performed by: PHYSICIAN ASSISTANT

## 2025-06-06 PROCEDURE — 27000221 HC OXYGEN, UP TO 24 HOURS

## 2025-06-06 PROCEDURE — 63600175 PHARM REV CODE 636 W HCPCS: Performed by: PHYSICIAN ASSISTANT

## 2025-06-06 PROCEDURE — 63600175 PHARM REV CODE 636 W HCPCS: Performed by: FAMILY MEDICINE

## 2025-06-06 PROCEDURE — 21400001 HC TELEMETRY ROOM

## 2025-06-06 PROCEDURE — 94761 N-INVAS EAR/PLS OXIMETRY MLT: CPT

## 2025-06-06 PROCEDURE — 99900035 HC TECH TIME PER 15 MIN (STAT)

## 2025-06-06 PROCEDURE — 25000242 PHARM REV CODE 250 ALT 637 W/ HCPCS: Performed by: INTERNAL MEDICINE

## 2025-06-06 RX ORDER — PREDNISONE 20 MG/1
20 TABLET ORAL DAILY
Status: DISCONTINUED | OUTPATIENT
Start: 2025-06-11 | End: 2025-06-07 | Stop reason: HOSPADM

## 2025-06-06 RX ADMIN — BUDESONIDE 0.5 MG: 0.5 INHALANT RESPIRATORY (INHALATION) at 07:06

## 2025-06-06 RX ADMIN — ASPIRIN 81 MG: 81 TABLET, COATED ORAL at 09:06

## 2025-06-06 RX ADMIN — LEVOTHYROXINE SODIUM 50 MCG: 50 TABLET ORAL at 09:06

## 2025-06-06 RX ADMIN — IPRATROPIUM BROMIDE AND ALBUTEROL SULFATE 3 ML: 2.5; .5 SOLUTION RESPIRATORY (INHALATION) at 03:06

## 2025-06-06 RX ADMIN — CETIRIZINE HYDROCHLORIDE 10 MG: 10 TABLET, FILM COATED ORAL at 09:06

## 2025-06-06 RX ADMIN — PANTOPRAZOLE SODIUM 40 MG: 40 TABLET, DELAYED RELEASE ORAL at 09:06

## 2025-06-06 RX ADMIN — GUAIFENESIN AND CODEINE PHOSPHATE 5 ML: 100; 10 SOLUTION ORAL at 06:06

## 2025-06-06 RX ADMIN — AMIODARONE HYDROCHLORIDE 200 MG: 200 TABLET ORAL at 04:06

## 2025-06-06 RX ADMIN — SACUBITRIL AND VALSARTAN 1 TABLET: 24; 26 TABLET, FILM COATED ORAL at 08:06

## 2025-06-06 RX ADMIN — IPRATROPIUM BROMIDE AND ALBUTEROL SULFATE 3 ML: 2.5; .5 SOLUTION RESPIRATORY (INHALATION) at 11:06

## 2025-06-06 RX ADMIN — PREDNISONE 40 MG: 20 TABLET ORAL at 09:06

## 2025-06-06 RX ADMIN — FUROSEMIDE 20 MG: 20 TABLET ORAL at 06:06

## 2025-06-06 RX ADMIN — Medication 2 CAPSULE: at 09:06

## 2025-06-06 RX ADMIN — ATORVASTATIN CALCIUM 40 MG: 40 TABLET, FILM COATED ORAL at 08:06

## 2025-06-06 RX ADMIN — GUAIFENESIN AND CODEINE PHOSPHATE 5 ML: 100; 10 SOLUTION ORAL at 11:06

## 2025-06-06 RX ADMIN — Medication 2 CAPSULE: at 04:06

## 2025-06-06 RX ADMIN — LEVOFLOXACIN 750 MG: 750 INJECTION, SOLUTION INTRAVENOUS at 01:06

## 2025-06-06 RX ADMIN — SACUBITRIL AND VALSARTAN 1 TABLET: 24; 26 TABLET, FILM COATED ORAL at 09:06

## 2025-06-06 RX ADMIN — THERA TABS 1 TABLET: TAB at 09:06

## 2025-06-06 RX ADMIN — Medication 2 CAPSULE: at 01:06

## 2025-06-06 RX ADMIN — TAMSULOSIN HYDROCHLORIDE 0.4 MG: 0.4 CAPSULE ORAL at 09:06

## 2025-06-06 RX ADMIN — IPRATROPIUM BROMIDE AND ALBUTEROL SULFATE 3 ML: 2.5; .5 SOLUTION RESPIRATORY (INHALATION) at 07:06

## 2025-06-06 RX ADMIN — CLOPIDOGREL BISULFATE 75 MG: 75 TABLET ORAL at 09:06

## 2025-06-06 RX ADMIN — GUAIFENESIN AND CODEINE PHOSPHATE 5 ML: 100; 10 SOLUTION ORAL at 08:06

## 2025-06-06 NOTE — PLAN OF CARE
Problem: Adult Inpatient Plan of Care  Goal: Plan of Care Review  Outcome: Progressing  Flowsheets (Taken 6/6/2025 0740)  Plan of Care Reviewed With: patient  Goal: Patient-Specific Goal (Individualized)  Outcome: Progressing  Flowsheets (Taken 6/6/2025 0740)  Individualized Care Needs: IV ABX, oxygen therapy, PRN cough medicine, bed treatments  Anxieties, Fears or Concerns: coughing  Patient/Family-Specific Goals (Include Timeframe): completion of IV ABX  Goal: Absence of Hospital-Acquired Illness or Injury  Outcome: Progressing  Intervention: Prevent Skin Injury  Flowsheets (Taken 6/6/2025 0740)  Body Position: sitting up in bed  Goal: Optimal Comfort and Wellbeing  Outcome: Progressing  Goal: Readiness for Transition of Care  Outcome: Progressing     Problem: Infection  Goal: Absence of Infection Signs and Symptoms  Outcome: Progressing     Problem: Fall Injury Risk  Goal: Absence of Fall and Fall-Related Injury  Outcome: Progressing  Intervention: Identify and Manage Contributors  Flowsheets (Taken 6/6/2025 0740)  Self-Care Promotion:   independence encouraged   BADL personal objects within reach   BADL personal routines maintained   meal set-up provided   adaptive equipment use encouraged

## 2025-06-06 NOTE — PROGRESS NOTES
Hospital Medicine  Progress Note    Patient Name: Babak Scales  MRN: [id number]  Status: IP- Inpatient   Admission Date: 6/3/2025  Length of Stay: 2  Date of Service: 06/06/2025       CC: hospital follow-up for copd       SUBJECTIVE   Code Status: Full  Social drivers: I have screed the patient for housing insecurity, food insecurity, access to follow up appointments as well as issues with safety at home and have not identified them at this time but will address as needs arise.      Mr. Babak Scales is a 79-year-old male presenting to the emergency department with persistent nasal congestion and cough for the past 4 days. He was evaluated in the ED yesterday for similar symptoms and discharged with a prescription for oral corticosteroids and antibiotics; however, he reports no clinical improvement and returns today due to ongoing discomfort. He denies chest pain, fever, or chills. Given the lack of response to outpatient therapy, he is being admitted under observation status for management of a presumed COPD/asthma exacerbation.     Today: Patient remains short of breath. Will obtain CT chest without contrast for further evaluation. Initiating acetylcysteine (Mucomyst) and obtaining an arterial blood gas. Pulmonary Telemedicine consultation has been requested for further management recommendations. ECHO also ordered as well as CIS consultation.      6/5/2025  Sob better today     6/6/2025  Feels better today  O2 sats stable on 2 L NC    Today: Patient seen and examined at bedside, and chart reviewed.       MEDICATIONS   Scheduled   albuterol-ipratropium  3 mL Nebulization Q4H    amiodarone  200 mg Oral Daily    aspirin  81 mg Oral Daily    atorvastatin  40 mg Oral QHS    budesonide  0.5 mg Nebulization Q12H    cetirizine  10 mg Oral Daily    clopidogreL  75 mg Oral Daily    enoxparin  40 mg Subcutaneous Daily    furosemide  20 mg Oral QAM    Lactobacillus acidophilus  2 capsule Oral TID WM    levoFLOXacin  750 mg  Intravenous Q24H    levothyroxine  50 mcg Oral Daily    multivitamin  1 tablet Oral Daily    pantoprazole  40 mg Oral Daily    predniSONE  40 mg Oral Daily    sacubitriL-valsartan  1 tablet Oral BID    tamsulosin  0.4 mg Oral Daily     Continuous Infusions        PHYSICAL EXAM   VITALS: T 98.6 °F (37 °C)   /63   P 84   RR 20   O2 96 %    GENERAL: Awake and in NAD  LUNGS: CTA B/L  CVS: Normal rate  GI/: Soft, NT, bowel sounds positive.  EXTREMITIES: No peripheral edema  NEURO: AAOx3  PSYCH: Cooperative      LABS   CBC  Recent Labs     06/04/25  0606   WBC 15.54*   RBC 3.75*   HGB 11.9*   HCT 36.2*   MCV 96.5*   MCH 31.7*   MCHC 32.9*   RDW 14.8        CHEM  Recent Labs     06/04/25  0604      K 4.5   CO2 30   BUN 19.9   CREATININE 0.64*   CALCIUM 8.5*   MG 2.30   PHOS 2.8   ALBUMIN 3.1*   GLOBULIN 3.4   ALKPHOS 60   ALT 15   AST 25   BILITOT 1.0         MICROBIOLOGY     Microbiology Results (last 7 days)       Procedure Component Value Units Date/Time    Respiratory Culture [[id number]] Collected: 06/03/25 1316    Order Status: Completed Specimen: Sputum, Expectorated Updated: 06/06/25 0917     Respiratory Culture Normal respiratory zuly     GRAM STAIN Quality 3+      Many Gram positive cocci      Rare Gram Negative Rods              DIAGNOSTICS   Echo    Left Ventricle: The left ventricle is mildly dilated. Normal wall   thickness. Moderate global hypokinesis present. There is moderately   reduced systolic function with a visually estimated ejection fraction of   35 - 40%.    Right Ventricle: The right ventricle is normal in size Systolic   function is normal.    Mitral Valve: There is mild regurgitation.    Tricuspid Valve: There is mild regurgitation.    Limited view study.  CT Chest Without Contrast  Narrative: EXAMINATION:  CT CHEST WITHOUT CONTRAST    CLINICAL HISTORY:  Respiratory illness, nondiagnostic xray;    TECHNIQUE:  Helical acquisition through the chest performed without IV  contrast. Three plane reconstructions made available for review.  mGycm. Automatic exposure control, adjustment of mA/kV or iterative reconstruction technique was used to reduce radiation.    COMPARISON:  19 December 2022    FINDINGS:  There is no mediastinal, hilar or axillary lymphadenopathy by size criteria.    Heart is mildly enlarged.  No pericardial effusion.  There are dense coronary artery calcifications.    There is no pleural effusion.  There is some right basilar scarring.  Some clustered small nodules are seen in the right middle and lower lobes images 80-95 series 3.  There is also a 9 mm left lower lobe nodule image 110 series 3.  There is no dense consolidation.  Mild bronchiectasis.    There are no acute findings in the imaged upper abdomen.  There are no acute osseous findings.  Impression: Scattered nodular opacities favored infectious or inflammatory.  Given the left lower lobe nodule measures 9 mm recommend a follow-up chest CT in 3 months to document resolution.    Electronically signed by: Luca Mercado  Date:    06/04/2025  Time:    09:50      ASSESSMENT AND PLAN                Active Hospital Problems     Diagnosis   POA    *COPD exacerbation [J44.1]   Yes       Resolved Hospital Problems   No resolved problems to display.    COPD/Asthma Exacerbation  Persistent cough and nasal congestion for 4 days with minimal response to outpatient steroids and antibiotics.  Initiated:  Albuterol-Ipratropium nebs Q6H scheduled and PRN  Budesonide nebulizer BID  Started on 6/5/2025- 40 mg of oral prednisone for 3 days then 30 mg of prednisone for 3 days then 20 mg of prednisone for 3 days then 10 mg of prednisone for 3 days then off   Levofloxacin 750 mg IV daily x 5 days (broad coverage for potential bacterial bronchitis or pneumonia)  Droplet precautions in place  Continuous O2 with titration to maintain SpO? >= 90%  Incentive spirometry Q4H while awake  Continuous pulse oximetry  Respiratory  culture and RSV testing sent  Monitor for wheezing, increased work of breathing, or hypoxia  Plan to de-escalate to oral therapy if clinical improvement  Patient remains short of breath. Will obtain CT chest without contrast for further evaluation. Initiating acetylcysteine (Mucomyst) and obtaining an arterial blood gas. Pulmonary Telemedicine consultation has been requested for further management recommendations.  History of Atrial Fibrillation on Amiodarone  Amiodarone 200 mg PO daily continued  Monitor QT interval, HR; cardiac monitoring in place  Avoid macrolides and other QT-prolonging agents  History of watchman procedure.   CHF with HFrEF (on sacubitril-valsartan)  Continue current home regimen including sacubitril-valsartan 24/26 mg BID and furosemide 20 mg daily  Monitor fluid status, daily weights, electrolytes  Maintain BP parameters to avoid hypotension  CAD/Post-MI  Continue aspirin 81 mg (noted GI intolerance, monitor for symptoms)  Continue clopidogrel 75 mg daily  Continue atorvastatin 40 mg nightly  DVT Prophylaxis  Enoxaparin 40 mg SC daily initiated  SCDs placed  GERD/Peptic Ulcer Disease  Pantoprazole 40 mg PO daily  Antacid PRN (aluminum-magnesium-simethicone)  Thyroid Disease  Continue levothyroxine 50 mcg PO daily  Other Comorbidities:  BPH ? Tamsulosin 0.4 mg PO daily continued  Hyperlipidemia ? Continue statin  Obesity, osteoarthritis ? PT/OT to evaluate and treat  History of insomnia ? Melatonin 9 mg PRN at bedtime  Pain Management  Acetaminophen 650 mg PO Q4H PRN mild pain/fever  Hydrocodone-APAP PRN moderate pain  Morphine IV PRN for severe pain  Naloxone ordered PRN for reversal  Diabetes Monitoring  POCT glucose 4x/day  Insulin aspart sliding scale PRN  Nutrition  Heart healthy diet initiated  Multivitamin PO daily  Code Status: Full  Patient is alert, oriented, and capable of participating in medical decision-making  Disposition Plan  Observation admission; will reassess response to  therapy within 24-48 hours  Will consider stepdown to oral steroids/bronchodilators as tolerated  Discharge planning contingent on clinical improvement and oxygen needs        Prophylaxis: alba Stone MD  [facility] Medicine

## 2025-06-06 NOTE — PLAN OF CARE
Problem: Adult Inpatient Plan of Care  Goal: Plan of Care Review  Outcome: Progressing  Flowsheets (Taken 6/6/2025 0035)  Plan of Care Reviewed With: patient  Goal: Patient-Specific Goal (Individualized)  Outcome: Progressing  Flowsheets (Taken 6/6/2025 0035)  Individualized Care Needs: IV Abx, Oxygen Therapy, PRN cough medication, Neb treatments every 4 hours  Anxieties, Fears or Concerns: none at this time  Patient/Family-Specific Goals (Include Timeframe): Improvement in pt condition by discharge

## 2025-06-06 NOTE — PROGRESS NOTES
Inpatient Nutrition Assessment    Admit Date: 6/3/2025   Total duration of encounter: 3 days   Patient Age: 79 y.o.    Nutrition Recommendation/Prescription     Continue heart healthy  diet  Continue multivitamin PO daily  Monitor intake, wt, labs,medication     Communication of Recommendations: reviewed with nurse    Nutrition Assessment     Malnutrition Assessment/Nutrition-Focused Physical Exam       Malnutrition Level: other (see comments) (Unable to assess) (06/06/25 1239)  Energy Intake (Malnutrition): other (see comments) (Does not meet criteria) (06/06/25 1239)  Weight Loss (Malnutrition): other (see comments) (Unable to assess) (06/06/25 1239)                                         Fluid Accumulation (Malnutrition): other (see comments) (Unable to assess) (06/06/25 1239)     Hand  Strength, Right (Malnutrition): Unable to assess (06/06/25 1239)  A minimum of two characteristics is recommended for diagnosis of either severe or non-severe malnutrition.    Chart Review    Reason Seen: continuous nutrition monitoring and length of stay    Malnutrition Screening Tool Results   Have you recently lost weight without trying?: No  Have you been eating poorly because of a decreased appetite?: No   MST Score: 0   Diagnosis:  COPD/Asthma Exacerbation  History of Atrial Fibrillation on Amiodaron   History of Atrial Fibrillation on Amiodaron   CAD/Post MI  DVT prophylaxis  GERD/Peptic Ulcer Disease  Thyroid disease  Other Comorbidities  Paint Management  Diabetes Monitoring  Nutrition: heart healthy diet, multivitamin PO daily      Arthritis      Asthma      Atrial fibrillation       xarelto    Bladder cancer      Bladder disorder      CAD (coronary artery disease)      CHF (congestive heart failure)      COPD (chronic obstructive pulmonary disease)      Diverticulitis      Enlarged prostate      GERD (gastroesophageal reflux disease)      Heart damage      History of kidney stones      HLD (hyperlipidemia)      [facility]  (hard of hearing)      Insomnia      Obesity      Skin cancer      SOB (shortness of breath)      Stroke      Thyroid disease      Relevant Medical History:     Scheduled Medications:  albuterol-ipratropium, 3 mL, Q4H  amiodarone, 200 mg, Daily  aspirin, 81 mg, Daily  atorvastatin, 40 mg, QHS  budesonide, 0.5 mg, Q12H  cetirizine, 10 mg, Daily  clopidogreL, 75 mg, Daily  enoxparin, 40 mg, Daily  furosemide, 20 mg, QAM  Lactobacillus acidophilus, 2 capsule, TID WM  levoFLOXacin, 750 mg, Q24H  levothyroxine, 50 mcg, Daily  multivitamin, 1 tablet, Daily  pantoprazole, 40 mg, Daily  [START ON 6/8/2025] predniSONE, 30 mg, Daily   Followed by  [START ON 6/11/2025] predniSONE, 20 mg, Daily  predniSONE, 40 mg, Daily  sacubitriL-valsartan, 1 tablet, BID  tamsulosin, 0.4 mg, Daily    Continuous Infusions:   PRN Medications:  acetaminophen, 650 mg, Q4H PRN  albuterol-ipratropium, 3 mL, Q6H PRN  aluminum-magnesium hydroxide-simethicone, 30 mL, QID PRN  bisacodyL, 10 mg, Daily PRN  dextrose 50%, 12.5 g, PRN  dextrose 50%, 25 g, PRN  glucagon (human recombinant), 1 mg, PRN  glucose, 16 g, PRN  glucose, 24 g, PRN  guaiFENesin-codeine 100-10 mg/5 ml, 5 mL, Q4H PRN  HYDROcodone-acetaminophen, 1 tablet, Q6H PRN  insulin aspart U-100, 0-10 Units, QID (AC + HS) PRN  melatonin, 9 mg, Nightly PRN  morphine, 2 mg, Q6H PRN  naloxone, 0.02 mg, PRN  ondansetron, 8 mg, Q8H PRN  ondansetron, 4 mg, Q8H PRN  polyethylene glycol, 17 g, TID PRN  simethicone, 1 tablet, QID PRN  sodium chloride 0.9%, 10 mL, PRN    Calorie Containing IV Medications: no significant kcals from medications at this time    Recent Labs   Lab 06/02/25  0138 06/03/25  0954 06/03/25  0957 06/04/25  0604 06/04/25  0606     --  140 140  --    K 3.7  --  3.9 4.5  --    CALCIUM 8.9  --  9.0 8.5*  --    PHOS  --   --   --  2.8  --    MG  --   --   --  2.30  --      --  104 105  --    CO2 26  --  27 30  --    BUN 16.6  --  19.7 19.9  --    CREATININE 0.67*  --  0.70*  "0.64*  --    EGFRNORACEVR >60  --  >60 >60  --      --  108 148*  --    BILITOT 1.6*  --  1.1 1.0  --    ALKPHOS 80  --  69 60  --    ALT 11  --  13 15  --    AST 17  --  24 25  --    ALBUMIN 3.8  --  3.6 3.1*  --    WBC 14.87* 19.61*  --   --  15.54*   HGB 12.8* 12.9*  --   --  11.9*   HCT 38.7* 38.9*  --   --  36.2*     Nutrition Orders:  Diet Heart Healthy      Appetite/Oral Intake: good/% of meals  Factors Affecting Nutritional Intake: none identified  Social Needs Impacting Access to Food: none identified  Food/Hoahaoism/Cultural Preferences: none reported  Food Allergies: none reported  Last Bowel Movement: 06/04/25  Wound(s):      Comments    06/06/25: Spoke with nursing. Pt intake is good. Pt ate outside food, hamburger, for lunch. Labs/medications review. +BM per nursing 06/06/25. PO intake in flowsheet 100% of meals. Will monitor.     Anthropometrics    Height: 5' 10" (177.8 cm), Height Method: Stated  Last Weight: 91.1 kg (200 lb 13.4 oz) (06/06/25 1236), Weight Method: Bed Scale  BMI (Calculated): 28.8  BMI Classification: overweight (BMI 25-29.9)        Ideal Body Weight (IBW), Male: 166 lb     % Ideal Body Weight, Male (lb): 120.99 %                          Usual Weight Provided By: unable to obtain usual weight    Wt Readings from Last 5 Encounters:   06/06/25 91.1 kg (200 lb 13.4 oz)   06/02/25 90.7 kg (200 lb)   05/22/25 91.5 kg (201 lb 11.5 oz)   04/14/25 94.7 kg (208 lb 12.4 oz)   03/17/25 96.2 kg (212 lb)     Weight Change(s) Since Admission:   Wt Readings from Last 1 Encounters:   06/06/25 1236 91.1 kg (200 lb 13.4 oz)   06/03/25 1215 91.1 kg (200 lb 13.4 oz)   06/03/25 0909 93.4 kg (206 lb)   Admit Weight: 93.4 kg (206 lb) (06/03/25 0909), Weight Method: Stated    Estimated Needs    Weight Used For Calorie Calculations: 91.1 kg (200 lb 13.4 oz)  Energy Calorie Requirements (kcal): 2277.5 kcal (25 kcal/kg/CBW)  Energy Need Method: Kcal/kg  Weight Used For Protein Calculations: 91.1 " kg (200 lb 13.4 oz)  Protein Requirements: 91.29 gm (1.0 gm/kg/CBW)  Fluid Requirements (mL): 2277.5 mL (1 mL/kcal)        Enteral Nutrition     Patient not receiving enteral nutrition at this time.    Parenteral Nutrition     Patient not receiving parenteral nutrition support at this time.    Evaluation of Received Nutrient Intake    Calories: meeting estimated needs  Protein: meeting estimated needs    Patient Education     Not applicable.    Nutrition Diagnosis     PES: No nutrition diagnosis at this time     PES:            Nutrition Interventions     Intervention(s): general/healthful diet  Intervention(s):      Goal: Consume % of meals/snacks by follow-up. (new)  Goal: Maintain weight throughout hospitalization. (new)    Nutrition Goals & Monitoring     Dietitian will monitor: food and beverage intake, weight, weight change, electrolyte/renal panel, glucose/endocrine profile, and gastrointestinal profile  Discharge planning: continue heart healthy diet  Nutrition Risk/Follow-Up: dietitian will follow-up one time per week   Please consult if re-assessment needed sooner.

## 2025-06-07 ENCOUNTER — HOSPITAL ENCOUNTER (INPATIENT)
Facility: HOSPITAL | Age: 80
LOS: 2 days | Discharge: HOME | DRG: 190 | End: 2025-06-09
Attending: FAMILY MEDICINE | Admitting: INTERNAL MEDICINE
Payer: MEDICARE

## 2025-06-07 VITALS
OXYGEN SATURATION: 96 % | HEIGHT: 70 IN | SYSTOLIC BLOOD PRESSURE: 127 MMHG | DIASTOLIC BLOOD PRESSURE: 62 MMHG | WEIGHT: 200.81 LBS | BODY MASS INDEX: 28.75 KG/M2 | HEART RATE: 96 BPM | RESPIRATION RATE: 18 BRPM | TEMPERATURE: 98 F

## 2025-06-07 DIAGNOSIS — R07.9 CHEST PAIN: ICD-10-CM

## 2025-06-07 DIAGNOSIS — J44.1 COPD EXACERBATION: Primary | ICD-10-CM

## 2025-06-07 DIAGNOSIS — J12.3 HUMAN METAPNEUMOVIRUS (HMPV) PNEUMONIA: ICD-10-CM

## 2025-06-07 LAB
BACTERIA BLD CULT: NORMAL
BACTERIA BLD CULT: NORMAL
POCT GLUCOSE: 104 MG/DL (ref 70–110)
POCT GLUCOSE: 137 MG/DL (ref 70–110)
POCT GLUCOSE: 238 MG/DL (ref 70–110)
POCT GLUCOSE: 94 MG/DL (ref 70–110)

## 2025-06-07 PROCEDURE — 25000003 PHARM REV CODE 250: Performed by: INTERNAL MEDICINE

## 2025-06-07 PROCEDURE — 25000242 PHARM REV CODE 250 ALT 637 W/ HCPCS: Performed by: INTERNAL MEDICINE

## 2025-06-07 PROCEDURE — 25000003 PHARM REV CODE 250: Performed by: PHYSICIAN ASSISTANT

## 2025-06-07 PROCEDURE — 27000207 HC ISOLATION

## 2025-06-07 PROCEDURE — 63600175 PHARM REV CODE 636 W HCPCS: Performed by: FAMILY MEDICINE

## 2025-06-07 PROCEDURE — 27000221 HC OXYGEN, UP TO 24 HOURS

## 2025-06-07 PROCEDURE — 63600175 PHARM REV CODE 636 W HCPCS: Performed by: INTERNAL MEDICINE

## 2025-06-07 PROCEDURE — 11000004 HC SNF PRIVATE

## 2025-06-07 PROCEDURE — 25000242 PHARM REV CODE 250 ALT 637 W/ HCPCS: Performed by: PHYSICIAN ASSISTANT

## 2025-06-07 PROCEDURE — 94640 AIRWAY INHALATION TREATMENT: CPT

## 2025-06-07 PROCEDURE — 94760 N-INVAS EAR/PLS OXIMETRY 1: CPT

## 2025-06-07 PROCEDURE — 99900035 HC TECH TIME PER 15 MIN (STAT)

## 2025-06-07 RX ORDER — SODIUM CHLORIDE 0.9 % (FLUSH) 0.9 %
10 SYRINGE (ML) INJECTION
Status: DISCONTINUED | OUTPATIENT
Start: 2025-06-07 | End: 2025-06-09 | Stop reason: HOSPADM

## 2025-06-07 RX ORDER — SIMETHICONE 80 MG
1 TABLET,CHEWABLE ORAL 4 TIMES DAILY PRN
Status: DISCONTINUED | OUTPATIENT
Start: 2025-06-07 | End: 2025-06-09 | Stop reason: HOSPADM

## 2025-06-07 RX ORDER — AMIODARONE HYDROCHLORIDE 200 MG/1
200 TABLET ORAL DAILY
Status: CANCELLED | OUTPATIENT
Start: 2025-06-07

## 2025-06-07 RX ORDER — IBUPROFEN 200 MG
24 TABLET ORAL
Status: DISCONTINUED | OUTPATIENT
Start: 2025-06-07 | End: 2025-06-09 | Stop reason: HOSPADM

## 2025-06-07 RX ORDER — IPRATROPIUM BROMIDE AND ALBUTEROL SULFATE 2.5; .5 MG/3ML; MG/3ML
3 SOLUTION RESPIRATORY (INHALATION) EVERY 4 HOURS
Status: DISCONTINUED | OUTPATIENT
Start: 2025-06-07 | End: 2025-06-09 | Stop reason: HOSPADM

## 2025-06-07 RX ORDER — FUROSEMIDE 20 MG/1
20 TABLET ORAL EVERY MORNING
Status: CANCELLED | OUTPATIENT
Start: 2025-06-08

## 2025-06-07 RX ORDER — BUDESONIDE 0.5 MG/2ML
0.5 INHALANT ORAL EVERY 12 HOURS
Status: DISCONTINUED | OUTPATIENT
Start: 2025-06-07 | End: 2025-06-09 | Stop reason: HOSPADM

## 2025-06-07 RX ORDER — BENZONATATE 100 MG/1
100 CAPSULE ORAL 3 TIMES DAILY PRN
Status: DISCONTINUED | OUTPATIENT
Start: 2025-06-07 | End: 2025-06-07 | Stop reason: HOSPADM

## 2025-06-07 RX ORDER — PREDNISONE 20 MG/1
20 TABLET ORAL DAILY
Status: DISCONTINUED | OUTPATIENT
Start: 2025-06-11 | End: 2025-06-09 | Stop reason: HOSPADM

## 2025-06-07 RX ORDER — TALC
9 POWDER (GRAM) TOPICAL NIGHTLY PRN
Status: CANCELLED | OUTPATIENT
Start: 2025-06-07

## 2025-06-07 RX ORDER — CLOPIDOGREL BISULFATE 75 MG/1
75 TABLET ORAL DAILY
Status: CANCELLED | OUTPATIENT
Start: 2025-06-08

## 2025-06-07 RX ORDER — GLUCAGON 1 MG
1 KIT INJECTION
Status: DISCONTINUED | OUTPATIENT
Start: 2025-06-07 | End: 2025-06-09 | Stop reason: HOSPADM

## 2025-06-07 RX ORDER — CODEINE PHOSPHATE AND GUAIFENESIN 10; 100 MG/5ML; MG/5ML
5 SOLUTION ORAL EVERY 4 HOURS PRN
Status: CANCELLED | OUTPATIENT
Start: 2025-06-07

## 2025-06-07 RX ORDER — ONDANSETRON HYDROCHLORIDE 2 MG/ML
4 INJECTION, SOLUTION INTRAVENOUS EVERY 8 HOURS PRN
Status: DISCONTINUED | OUTPATIENT
Start: 2025-06-07 | End: 2025-06-09 | Stop reason: HOSPADM

## 2025-06-07 RX ORDER — SODIUM CHLORIDE 0.9 % (FLUSH) 0.9 %
10 SYRINGE (ML) INJECTION
Status: CANCELLED | OUTPATIENT
Start: 2025-06-07

## 2025-06-07 RX ORDER — INSULIN ASPART 100 [IU]/ML
0-10 INJECTION, SOLUTION INTRAVENOUS; SUBCUTANEOUS
Status: CANCELLED | OUTPATIENT
Start: 2025-06-07

## 2025-06-07 RX ORDER — SIMETHICONE 80 MG
1 TABLET,CHEWABLE ORAL 4 TIMES DAILY PRN
Status: CANCELLED | OUTPATIENT
Start: 2025-06-07

## 2025-06-07 RX ORDER — HYDROCODONE BITARTRATE AND ACETAMINOPHEN 5; 325 MG/1; MG/1
1 TABLET ORAL EVERY 6 HOURS PRN
Status: DISCONTINUED | OUTPATIENT
Start: 2025-06-07 | End: 2025-06-09 | Stop reason: HOSPADM

## 2025-06-07 RX ORDER — IBUPROFEN 200 MG
16 TABLET ORAL
Status: DISCONTINUED | OUTPATIENT
Start: 2025-06-07 | End: 2025-06-09 | Stop reason: HOSPADM

## 2025-06-07 RX ORDER — IBUPROFEN 200 MG
24 TABLET ORAL
Status: CANCELLED | OUTPATIENT
Start: 2025-06-07

## 2025-06-07 RX ORDER — FUROSEMIDE 20 MG/1
20 TABLET ORAL EVERY MORNING
Status: DISCONTINUED | OUTPATIENT
Start: 2025-06-08 | End: 2025-06-09 | Stop reason: HOSPADM

## 2025-06-07 RX ORDER — INSULIN ASPART 100 [IU]/ML
0-10 INJECTION, SOLUTION INTRAVENOUS; SUBCUTANEOUS
Status: DISCONTINUED | OUTPATIENT
Start: 2025-06-07 | End: 2025-06-09 | Stop reason: HOSPADM

## 2025-06-07 RX ORDER — BENZONATATE 100 MG/1
100 CAPSULE ORAL 3 TIMES DAILY PRN
Status: CANCELLED | OUTPATIENT
Start: 2025-06-07

## 2025-06-07 RX ORDER — IBUPROFEN 200 MG
16 TABLET ORAL
Status: CANCELLED | OUTPATIENT
Start: 2025-06-07

## 2025-06-07 RX ORDER — LEVOTHYROXINE SODIUM 50 UG/1
50 TABLET ORAL DAILY
Status: DISCONTINUED | OUTPATIENT
Start: 2025-06-08 | End: 2025-06-09 | Stop reason: HOSPADM

## 2025-06-07 RX ORDER — POLYETHYLENE GLYCOL 3350 17 G/17G
17 POWDER, FOR SOLUTION ORAL 3 TIMES DAILY PRN
Status: CANCELLED | OUTPATIENT
Start: 2025-06-07

## 2025-06-07 RX ORDER — BISACODYL 10 MG/1
10 SUPPOSITORY RECTAL DAILY PRN
Status: DISCONTINUED | OUTPATIENT
Start: 2025-06-07 | End: 2025-06-09 | Stop reason: HOSPADM

## 2025-06-07 RX ORDER — ASPIRIN 81 MG/1
81 TABLET ORAL DAILY
Status: DISCONTINUED | OUTPATIENT
Start: 2025-06-08 | End: 2025-06-09 | Stop reason: HOSPADM

## 2025-06-07 RX ORDER — CODEINE PHOSPHATE AND GUAIFENESIN 10; 100 MG/5ML; MG/5ML
5 SOLUTION ORAL EVERY 4 HOURS PRN
Status: DISCONTINUED | OUTPATIENT
Start: 2025-06-07 | End: 2025-06-09 | Stop reason: HOSPADM

## 2025-06-07 RX ORDER — ENOXAPARIN SODIUM 100 MG/ML
40 INJECTION SUBCUTANEOUS EVERY 24 HOURS
Status: DISCONTINUED | OUTPATIENT
Start: 2025-06-07 | End: 2025-06-09 | Stop reason: HOSPADM

## 2025-06-07 RX ORDER — PREDNISONE 20 MG/1
20 TABLET ORAL DAILY
Status: CANCELLED | OUTPATIENT
Start: 2025-06-11 | End: 2025-06-14

## 2025-06-07 RX ORDER — IPRATROPIUM BROMIDE AND ALBUTEROL SULFATE 2.5; .5 MG/3ML; MG/3ML
3 SOLUTION RESPIRATORY (INHALATION) EVERY 6 HOURS PRN
Status: DISCONTINUED | OUTPATIENT
Start: 2025-06-07 | End: 2025-06-09 | Stop reason: HOSPADM

## 2025-06-07 RX ORDER — ENOXAPARIN SODIUM 100 MG/ML
40 INJECTION SUBCUTANEOUS EVERY 24 HOURS
Status: CANCELLED | OUTPATIENT
Start: 2025-06-07

## 2025-06-07 RX ORDER — TAMSULOSIN HYDROCHLORIDE 0.4 MG/1
0.4 CAPSULE ORAL DAILY
Status: DISCONTINUED | OUTPATIENT
Start: 2025-06-08 | End: 2025-06-09 | Stop reason: HOSPADM

## 2025-06-07 RX ORDER — ACETAMINOPHEN 325 MG/1
650 TABLET ORAL EVERY 4 HOURS PRN
Status: CANCELLED | OUTPATIENT
Start: 2025-06-07

## 2025-06-07 RX ORDER — ATORVASTATIN CALCIUM 40 MG/1
40 TABLET, FILM COATED ORAL NIGHTLY
Status: DISCONTINUED | OUTPATIENT
Start: 2025-06-07 | End: 2025-06-09 | Stop reason: HOSPADM

## 2025-06-07 RX ORDER — ALUMINUM HYDROXIDE, MAGNESIUM HYDROXIDE, AND SIMETHICONE 1200; 120; 1200 MG/30ML; MG/30ML; MG/30ML
30 SUSPENSION ORAL 4 TIMES DAILY PRN
Status: DISCONTINUED | OUTPATIENT
Start: 2025-06-07 | End: 2025-06-09 | Stop reason: HOSPADM

## 2025-06-07 RX ORDER — BENZONATATE 100 MG/1
100 CAPSULE ORAL 3 TIMES DAILY PRN
Status: DISCONTINUED | OUTPATIENT
Start: 2025-06-07 | End: 2025-06-09 | Stop reason: HOSPADM

## 2025-06-07 RX ORDER — LEVOTHYROXINE SODIUM 50 UG/1
50 TABLET ORAL DAILY
Status: CANCELLED | OUTPATIENT
Start: 2025-06-08

## 2025-06-07 RX ORDER — CETIRIZINE HYDROCHLORIDE 10 MG/1
10 TABLET ORAL DAILY
Status: DISCONTINUED | OUTPATIENT
Start: 2025-06-08 | End: 2025-06-09 | Stop reason: HOSPADM

## 2025-06-07 RX ORDER — AMIODARONE HYDROCHLORIDE 200 MG/1
200 TABLET ORAL DAILY
Status: DISCONTINUED | OUTPATIENT
Start: 2025-06-07 | End: 2025-06-09 | Stop reason: HOSPADM

## 2025-06-07 RX ORDER — ONDANSETRON 4 MG/1
8 TABLET, ORALLY DISINTEGRATING ORAL EVERY 8 HOURS PRN
Status: DISCONTINUED | OUTPATIENT
Start: 2025-06-07 | End: 2025-06-09 | Stop reason: HOSPADM

## 2025-06-07 RX ORDER — BISACODYL 10 MG/1
10 SUPPOSITORY RECTAL DAILY PRN
Status: CANCELLED | OUTPATIENT
Start: 2025-06-07

## 2025-06-07 RX ORDER — IPRATROPIUM BROMIDE AND ALBUTEROL SULFATE 2.5; .5 MG/3ML; MG/3ML
3 SOLUTION RESPIRATORY (INHALATION) EVERY 4 HOURS
Status: CANCELLED | OUTPATIENT
Start: 2025-06-07

## 2025-06-07 RX ORDER — ASPIRIN 81 MG/1
81 TABLET ORAL DAILY
Status: CANCELLED | OUTPATIENT
Start: 2025-06-08

## 2025-06-07 RX ORDER — ALUMINUM HYDROXIDE, MAGNESIUM HYDROXIDE, AND SIMETHICONE 1200; 120; 1200 MG/30ML; MG/30ML; MG/30ML
30 SUSPENSION ORAL 4 TIMES DAILY PRN
Status: CANCELLED | OUTPATIENT
Start: 2025-06-07

## 2025-06-07 RX ORDER — CETIRIZINE HYDROCHLORIDE 10 MG/1
10 TABLET ORAL DAILY
Status: CANCELLED | OUTPATIENT
Start: 2025-06-08

## 2025-06-07 RX ORDER — BUDESONIDE 0.5 MG/2ML
0.5 INHALANT ORAL EVERY 12 HOURS
Status: CANCELLED | OUTPATIENT
Start: 2025-06-07

## 2025-06-07 RX ORDER — ACETAMINOPHEN 325 MG/1
650 TABLET ORAL EVERY 4 HOURS PRN
Status: DISCONTINUED | OUTPATIENT
Start: 2025-06-07 | End: 2025-06-09 | Stop reason: HOSPADM

## 2025-06-07 RX ORDER — SELENIUM 50 MCG
2 TABLET ORAL
Status: DISCONTINUED | OUTPATIENT
Start: 2025-06-07 | End: 2025-06-09 | Stop reason: HOSPADM

## 2025-06-07 RX ORDER — IPRATROPIUM BROMIDE AND ALBUTEROL SULFATE 2.5; .5 MG/3ML; MG/3ML
3 SOLUTION RESPIRATORY (INHALATION) EVERY 6 HOURS PRN
Status: CANCELLED | OUTPATIENT
Start: 2025-06-07

## 2025-06-07 RX ORDER — TAMSULOSIN HYDROCHLORIDE 0.4 MG/1
0.4 CAPSULE ORAL DAILY
Status: CANCELLED | OUTPATIENT
Start: 2025-06-08

## 2025-06-07 RX ORDER — ONDANSETRON HYDROCHLORIDE 2 MG/ML
4 INJECTION, SOLUTION INTRAVENOUS EVERY 8 HOURS PRN
Status: CANCELLED | OUTPATIENT
Start: 2025-06-07

## 2025-06-07 RX ORDER — NALOXONE HCL 0.4 MG/ML
0.02 VIAL (ML) INJECTION
Status: CANCELLED | OUTPATIENT
Start: 2025-06-07

## 2025-06-07 RX ORDER — NALOXONE HCL 0.4 MG/ML
0.02 VIAL (ML) INJECTION
Status: DISCONTINUED | OUTPATIENT
Start: 2025-06-07 | End: 2025-06-09 | Stop reason: HOSPADM

## 2025-06-07 RX ORDER — POLYETHYLENE GLYCOL 3350 17 G/17G
17 POWDER, FOR SOLUTION ORAL 3 TIMES DAILY PRN
Status: DISCONTINUED | OUTPATIENT
Start: 2025-06-07 | End: 2025-06-09 | Stop reason: HOSPADM

## 2025-06-07 RX ORDER — PANTOPRAZOLE SODIUM 40 MG/1
40 TABLET, DELAYED RELEASE ORAL DAILY
Status: CANCELLED | OUTPATIENT
Start: 2025-06-08

## 2025-06-07 RX ORDER — TALC
9 POWDER (GRAM) TOPICAL NIGHTLY PRN
Status: DISCONTINUED | OUTPATIENT
Start: 2025-06-07 | End: 2025-06-09 | Stop reason: HOSPADM

## 2025-06-07 RX ORDER — ATORVASTATIN CALCIUM 40 MG/1
40 TABLET, FILM COATED ORAL NIGHTLY
Status: CANCELLED | OUTPATIENT
Start: 2025-06-07

## 2025-06-07 RX ORDER — GLUCAGON 1 MG
1 KIT INJECTION
Status: CANCELLED | OUTPATIENT
Start: 2025-06-07

## 2025-06-07 RX ORDER — SELENIUM 50 MCG
2 TABLET ORAL
Status: CANCELLED | OUTPATIENT
Start: 2025-06-07

## 2025-06-07 RX ORDER — CLOPIDOGREL BISULFATE 75 MG/1
75 TABLET ORAL DAILY
Status: DISCONTINUED | OUTPATIENT
Start: 2025-06-08 | End: 2025-06-09 | Stop reason: HOSPADM

## 2025-06-07 RX ORDER — PANTOPRAZOLE SODIUM 40 MG/1
40 TABLET, DELAYED RELEASE ORAL DAILY
Status: DISCONTINUED | OUTPATIENT
Start: 2025-06-08 | End: 2025-06-09 | Stop reason: HOSPADM

## 2025-06-07 RX ORDER — MORPHINE SULFATE 4 MG/ML
2 INJECTION, SOLUTION INTRAMUSCULAR; INTRAVENOUS EVERY 6 HOURS PRN
Refills: 0 | Status: CANCELLED | OUTPATIENT
Start: 2025-06-07

## 2025-06-07 RX ORDER — HYDROCODONE BITARTRATE AND ACETAMINOPHEN 5; 325 MG/1; MG/1
1 TABLET ORAL EVERY 6 HOURS PRN
Refills: 0 | Status: CANCELLED | OUTPATIENT
Start: 2025-06-07

## 2025-06-07 RX ORDER — ONDANSETRON 4 MG/1
8 TABLET, ORALLY DISINTEGRATING ORAL EVERY 8 HOURS PRN
Status: CANCELLED | OUTPATIENT
Start: 2025-06-07

## 2025-06-07 RX ORDER — MORPHINE SULFATE 4 MG/ML
2 INJECTION, SOLUTION INTRAMUSCULAR; INTRAVENOUS EVERY 6 HOURS PRN
Status: DISCONTINUED | OUTPATIENT
Start: 2025-06-07 | End: 2025-06-09 | Stop reason: HOSPADM

## 2025-06-07 RX ADMIN — PREDNISONE 40 MG: 20 TABLET ORAL at 09:06

## 2025-06-07 RX ADMIN — BENZONATATE 100 MG: 100 CAPSULE ORAL at 11:06

## 2025-06-07 RX ADMIN — IPRATROPIUM BROMIDE AND ALBUTEROL SULFATE 3 ML: 2.5; .5 SOLUTION RESPIRATORY (INHALATION) at 07:06

## 2025-06-07 RX ADMIN — Medication 2 CAPSULE: at 09:06

## 2025-06-07 RX ADMIN — SACUBITRIL AND VALSARTAN 1 TABLET: 24; 26 TABLET, FILM COATED ORAL at 09:06

## 2025-06-07 RX ADMIN — PANTOPRAZOLE SODIUM 40 MG: 40 TABLET, DELAYED RELEASE ORAL at 09:06

## 2025-06-07 RX ADMIN — ASPIRIN 81 MG: 81 TABLET, COATED ORAL at 09:06

## 2025-06-07 RX ADMIN — INSULIN ASPART 2 UNITS: 100 INJECTION, SOLUTION INTRAVENOUS; SUBCUTANEOUS at 08:06

## 2025-06-07 RX ADMIN — BENZONATATE 100 MG: 100 CAPSULE ORAL at 09:06

## 2025-06-07 RX ADMIN — FUROSEMIDE 20 MG: 20 TABLET ORAL at 06:06

## 2025-06-07 RX ADMIN — BUDESONIDE 0.5 MG: 0.5 INHALANT RESPIRATORY (INHALATION) at 07:06

## 2025-06-07 RX ADMIN — IPRATROPIUM BROMIDE AND ALBUTEROL SULFATE 3 ML: 2.5; .5 SOLUTION RESPIRATORY (INHALATION) at 12:06

## 2025-06-07 RX ADMIN — THERA TABS 1 TABLET: TAB at 09:06

## 2025-06-07 RX ADMIN — AMIODARONE HYDROCHLORIDE 200 MG: 200 TABLET ORAL at 04:06

## 2025-06-07 RX ADMIN — LEVOTHYROXINE SODIUM 50 MCG: 50 TABLET ORAL at 09:06

## 2025-06-07 RX ADMIN — ATORVASTATIN CALCIUM 40 MG: 40 TABLET, FILM COATED ORAL at 08:06

## 2025-06-07 RX ADMIN — TAMSULOSIN HYDROCHLORIDE 0.4 MG: 0.4 CAPSULE ORAL at 09:06

## 2025-06-07 RX ADMIN — IPRATROPIUM BROMIDE AND ALBUTEROL SULFATE 3 ML: 2.5; .5 SOLUTION RESPIRATORY (INHALATION) at 04:06

## 2025-06-07 RX ADMIN — Medication 2 CAPSULE: at 04:06

## 2025-06-07 RX ADMIN — BENZONATATE 100 MG: 100 CAPSULE ORAL at 04:06

## 2025-06-07 RX ADMIN — CLOPIDOGREL BISULFATE 75 MG: 75 TABLET ORAL at 09:06

## 2025-06-07 RX ADMIN — IPRATROPIUM BROMIDE AND ALBUTEROL SULFATE 3 ML: 2.5; .5 SOLUTION RESPIRATORY (INHALATION) at 11:06

## 2025-06-07 RX ADMIN — CETIRIZINE HYDROCHLORIDE 10 MG: 10 TABLET, FILM COATED ORAL at 09:06

## 2025-06-07 RX ADMIN — SACUBITRIL AND VALSARTAN 1 TABLET: 24; 26 TABLET, FILM COATED ORAL at 08:06

## 2025-06-07 RX ADMIN — Medication 2 CAPSULE: at 11:06

## 2025-06-07 NOTE — PLAN OF CARE
Problem: Adult Inpatient Plan of Care  Goal: Plan of Care Review  6/7/2025 1027 by Sumaya Holman RN  Outcome: Progressing  Flowsheets (Taken 6/7/2025 1023)  Plan of Care Reviewed With: patient  6/7/2025 1027 by Sumaya Holman RN  Outcome: Progressing  Goal: Patient-Specific Goal (Individualized)  6/7/2025 1027 by Sumaya Holman RN  Outcome: Progressing  Flowsheets (Taken 6/7/2025 1023)  Individualized Care Needs: monitor oxygen, Supplemental o2, keep o2 between 88-92%, monitor blood sugar  Anxieties, Fears or Concerns: cough  Patient/Family-Specific Goals (Include Timeframe): completion of IV ABX by d/c  6/7/2025 1027 by Sumaya Holman RN  Outcome: Progressing  Goal: Absence of Hospital-Acquired Illness or Injury  6/7/2025 1027 by Sumaya Holman RN  Outcome: Progressing  6/7/2025 1027 by Sumaya Holman RN  Outcome: Progressing  Intervention: Prevent Skin Injury  Flowsheets (Taken 6/7/2025 1023)  Body Position: sitting up in bed  Skin Protection:   incontinence pads utilized   skin sealant/moisture barrier applied   transparent dressing maintained  Device Skin Pressure Protection: pressure points protected  Goal: Optimal Comfort and Wellbeing  6/7/2025 1027 by Sumaya Holman RN  Outcome: Progressing  6/7/2025 1027 by Sumaya Holman RN  Outcome: Progressing  Goal: Readiness for Transition of Care  6/7/2025 1027 by Sumaya Holman RN  Outcome: Progressing  6/7/2025 1027 by Sumaya Holman RN  Outcome: Progressing  Intervention: Mutually Develop Transition Plan  Flowsheets (Taken 6/7/2025 1027)  Communicated FREDO with patient/caregiver: Date not available/Unable to determine  Do you expect to return to your current living situation?: Yes  Do you have help at home or someone to help you manage your care at home?: Yes  Readmission within 30 days?: No  Do you currently have service(s) that help you manage your care at home?: No     Problem: Infection  Goal: Absence of Infection Signs and Symptoms  6/7/2025 1027  by Sumaya Holman, RN  Outcome: Progressing  6/7/2025 1027 by Sumaya Holman, RN  Outcome: Progressing     Problem: Fall Injury Risk  Goal: Absence of Fall and Fall-Related Injury  6/7/2025 1027 by Sumaya Holman, RN  Outcome: Progressing  6/7/2025 1027 by Sumaya Holman, RN  Outcome: Progressing  Intervention: Identify and Manage Contributors  Flowsheets (Taken 6/7/2025 1023)  Self-Care Promotion:   independence encouraged   BADL personal objects within reach   BADL personal routines maintained   meal set-up provided   adaptive equipment use encouraged     Problem: Airway Clearance Ineffective  Goal: Effective Airway Clearance  Outcome: Progressing  Intervention: Promote Airway Secretion Clearance  Flowsheets (Taken 6/7/2025 1023)  Cough And Deep Breathing: done independently per patient

## 2025-06-07 NOTE — H&P
[facility] Medicine  Progress Note    Patient Name: Babak Scales  MRN: [id number]  Patient Class: IP- Inpatient   Admission Date: 6/3/2025  Length of Stay: 3 days  Attending Physician: Mika Stone MD  Primary Care Provider: Guillermo Brown Jr., MD        Subjective:     Principal Problem:COPD exacerbation    Interval History: Mr. Babak Scales is a 79-year-old male presenting to the emergency department with persistent nasal congestion and cough for the past 4 days. He was evaluated in the ED yesterday for similar symptoms and discharged with a prescription for oral corticosteroids and antibiotics; however, he reports no clinical improvement and returns today due to ongoing discomfort. He denies chest pain, fever, or chills. Given the lack of response to outpatient therapy, he is being admitted under observation status for management of a presumed COPD/asthma exacerbation.     Today: Patient remains short of breath. Will obtain CT chest without contrast for further evaluation. Initiating acetylcysteine (Mucomyst) and obtaining an arterial blood gas. Pulmonary Telemedicine consultation has been requested for further management recommendations. ECHO also ordered as well as CIS consultation.      6/5/2025  Sob better today      6/6/2025  Feels better today  O2 sats stable on 2 L NC    6/7/2025: Remains with cough and dyspnea with speaking. Long h/o COPD and followed by Pulm in [place]. Likely triggered by human metapneumovirus. Continue present corticosteroids and nebulized bronchodilators.     Review of Systems   All other systems reviewed and are negative.    Objective:     Vital Signs (Most Recent):  Temp: 97.8 °F (36.6 °C) (06/07/25 0718)  Pulse: 96 (06/07/25 0758)  Resp: 18 (06/07/25 0758)  BP: 127/62 (06/07/25 0942)  SpO2: 96 % (06/07/25 0758) Vital Signs (24h Range):  Temp:  [97.8 °F (36.6 °C)-98.4 °F (36.9 °C)] 97.8 °F (36.6 °C)  Pulse:  [78-96]  96  Resp:  [18-20] 18  SpO2:  [90 %-97 %] 96 %  BP: (112-135)/(62-75) 127/62     Weight: 91.1 kg (200 lb 13.4 oz)  Body mass index is 28.82 kg/m².    Intake/Output Summary (Last 24 hours) at 6/7/2025 1012  Last data filed at 6/7/2025 0904  Gross per 24 hour   Intake 600 ml   Output --   Net 600 ml      Physical Exam    GENERAL: Awake and in NAD  LUNGS: CTA B/L  CVS: Normal rate  GI/: Soft, NT, bowel sounds positive.  EXTREMITIES: No peripheral edema  NEURO: AAOx3  PSYCH: Cooperative    Significant Labs: All pertinent labs within the past 24 hours have been reviewed.    Significant Imaging: I have reviewed all pertinent imaging results/findings within the past 24 hours.    Assessment/Plan:     COPD/Asthma Exacerbation  Persistent cough and nasal congestion for 4 days with minimal response to outpatient steroids and antibiotics.  Albuterol-Ipratropium nebs Q6H scheduled and PRN  Budesonide nebulizer BID  Prednisone taper started on 6/5 --> continue slow taper as still with significant wheezing  Resp viral panel with Human Metapneumovirus (+) --> droplet precautions  Resp culture normal zuly   CT chest with scattered nodules: inflammatory/infectious  s/p 3 days of levofloxacin --> can stop           Continuous O2 with titration to maintain SpO? >= 90%  Incentive spirometry Q4H while awake  NIPPV if worsening resp status/work of breathing   Uses LAMA/LABA/ICS for maintenance as outpt   Atrial Fibrillation             Currently sinus rhythm; monitor on cardiac telemetry                      Continue amiodarone 200 mg PO daily continued  Avoid macrolides and other QT-prolonging agents  H/o TIARA closure device    HFrEF (40%)           No e/o current acute decompensation           Continue entresto 24/26 bid and furosemide           Uncertain why not on B-blocker, MRA, or SGLT2i for GDMT, but will defer to CIS  CAD/Post-MI  Continue aspirin 81 mg (noted GI intolerance, monitor for symptoms)  Continue clopidogrel 75 mg  daily  Continue atorvastatin 40 mg nightly  DVT Prophylaxis  Enoxaparin 40 mg SC daily initiated  SCDs placed  GERD/Peptic Ulcer Disease  Pantoprazole 40 mg PO daily  Antacid PRN (aluminum-magnesium-simethicone)  Thyroid Disease  Continue levothyroxine 50 mcg PO daily  Other Comorbidities:  BPH ? Tamsulosin 0.4 mg PO daily continued  Hyperlipidemia ? Continue statin  Obesity, osteoarthritis ? PT/OT to evaluate and treat  History of insomnia ? Melatonin 9 mg PRN at bedtime  Pain Management  Acetaminophen 650 mg PO Q4H PRN mild pain/fever  Hydrocodone-APAP PRN moderate pain  Morphine IV PRN for severe pain  Naloxone ordered PRN for reversal  Diabetes Monitoring  POCT glucose 4x/day  Insulin aspart sliding scale PRN  Nutrition  Heart healthy diet initiated  Multivitamin PO daily  Code Status: Full  Patient is alert, oriented, and capable of participating in medical decision-making  Disposition Plan  Observation admission; will reassess response to therapy within 24-48 hours  Will consider stepdown to oral steroids/bronchodilators as tolerated  Discharge planning contingent on clinical improvement and oxygen needs  Active Diagnoses:    Diagnosis Date Noted POA    PRINCIPAL PROBLEM:  COPD exacerbation [J44.1] 06/03/2025 Yes      Problems Resolved During this Admission:     VTE Risk Mitigation (From admission, onward)           Ordered     enoxaparin injection 40 mg  Daily         06/03/25 1126     IP VTE HIGH RISK PATIENT  Once         06/03/25 1126     Place sequential compression device  Until discontinued         06/03/25 1126                       Mike Mcgovern MD  Department of Hospital Medicine   [facility]

## 2025-06-07 NOTE — PLAN OF CARE
Problem: Adult Inpatient Plan of Care  Goal: Plan of Care Review  Outcome: Progressing  Flowsheets (Taken 6/6/2025 2000)  Plan of Care Reviewed With: patient  Goal: Patient-Specific Goal (Individualized)  Outcome: Progressing  Flowsheets (Taken 6/6/2025 2000)  Individualized Care Needs: Tele and O2 sat monitoring, Monitor blood glucose, IV antibiotics  Anxieties, Fears or Concerns: Pt concerned about increased coughing  Patient/Family-Specific Goals (Include Timeframe): Improved condition by discharge  Goal: Optimal Comfort and Wellbeing  Outcome: Progressing     Problem: Infection  Goal: Absence of Infection Signs and Symptoms  Outcome: Progressing  Intervention: Prevent or Manage Infection  Flowsheets (Taken 6/6/2025 2000)  Infection Management: aseptic technique maintained  Isolation Precautions:   precautions maintained   droplet

## 2025-06-07 NOTE — DISCHARGE SUMMARY
[facility]  [facility] Medicine  Discharge Summary      Patient Name: Babak Scales  MRN: [id number]  Admission Date: 6/3/2025  Hospital Length of Stay: 3 days  Discharge Date and Time: 06/07/2025 10:09 AM  Attending Physician: Mika Stone MD   Discharging Provider: Mike Mcgovern MD  Discharge Provider Team: Networked reference to record PCT   Primary Care Provider: Guillermo Brown Jr., MD        HPI:  79-year-old male presenting to the emergency department with persistent nasal congestion and cough for the past 4 days. He was evaluated in the ED yesterday for similar symptoms and discharged with a prescription for oral corticosteroids and antibiotics; however, he reports no clinical improvement and returns today due to ongoing discomfort. He denies chest pain, fever, or chills. Given the lack of response to outpatient therapy, he is being admitted under observation status for management of a presumed COPD/asthma exacerbation.       * No surgery found *      Hospital Course:   6/4/2025: Patient remains short of breath. Will obtain CT chest without contrast for further evaluation. Initiating acetylcysteine (Mucomyst) and obtaining an arterial blood gas. Pulmonary Telemedicine consultation has been requested for further management recommendations. ECHO also ordered as well as CIS consultation.      6/5/2025  Sob better today      6/6/2025  Feels better today  O2 sats stable on 2 L NC     6/7/2025: Remains with cough and dyspnea with speaking. Long h/o COPD and followed by Pulm in [place]. Likely triggered by human metapneumovirus. Continue present corticosteroids and nebulized bronchodilators. Pt will transition to swing bed status today.     Consults:   Consults (From admission, onward)          Status Ordering Provider     Inpatient consult to Cardiology  Once        Provider:  Liam Gil MD    Completed TSACEY CEE     Inpatient consult to Pulmonology   Once        Provider:  [employer]    Completed STACEY CEE            Final Active Diagnoses:    Diagnosis Date Noted POA    PRINCIPAL PROBLEM:  COPD exacerbation [J44.1] 06/03/2025 Yes      Problems Resolved During this Admission:      Discharged Condition: fair    Disposition: Swing Bed    Follow Up:   Follow-up Information       Guillermo Brown Jr., MD. Go on 6/12/2025.    Specialty: Family Medicine  Why: Thursday, 6/12/25 @ 10:25 a.m.    [facility]  Contact information:  [address] [place] [address] A  [facility] [id number]  [phone]                           Patient Instructions:   No discharge procedures on file.  Medications:  Reconciled Home Medications:      Medication List        ASK your doctor about these medications      albuterol 90 mcg/actuation inhaler  Commonly known as: VENTOLIN HFA  Inhale 2 puffs into the lungs every 6 (six) hours as needed for Wheezing. Rescue     alfuzosin 10 mg Tb24  Commonly known as: UROXATRAL  Take 10 mg by mouth once daily.     amiodarone 200 MG Tab  Commonly known as: PACERONE  Take 1 tablet by mouth Daily.     aspirin 81 MG EC tablet  Commonly known as: ECOTRIN  Take 1 tablet (81 mg total) by mouth once daily.     atorvastatin 40 MG tablet  Commonly known as: LIPITOR  Take 40 mg by mouth every evening.     azithromycin 250 MG tablet  Commonly known as: Z-ADIS  Take 1 tablet (250 mg total) by mouth once daily.     CENTRUM SILVER --300 mcg Tab  Generic drug: mv-min-folic-K1-lycopen-lutein  Take 1 tablet by mouth once daily.     clopidogreL 75 mg tablet  Commonly known as: PLAVIX  Take 1 tablet (75 mg total) by mouth once daily.     ENTRESTO 24-26 mg per tablet  Generic drug: sacubitriL-valsartan  Take 1 tablet by mouth 2 (two) times daily.     furosemide 20 MG tablet  Commonly known as: LASIX  Take 20 mg by mouth every morning.     levalbuterol 45 mcg/actuation inhaler  Commonly known as: XOPENEX HFA  See Instructions, INHALE 2 PUFFS BY MOUTH  "EVERY FOUR (4) HOURS AS NEEDED FOR WHEEZING, # 15 cap(s), 5 Refill(s), Pharmacy: [facility], 167, cm, Height/Length Dosing, 02/14/21 14:28:00 CST, 74, kg, Weight Dosing, 02/14/21 14:28:00 CST     levothyroxine 50 MCG tablet  Commonly known as: SYNTHROID  Take 50 mcg by mouth once daily.     loratadine 10 mg tablet  Commonly known as: CLARITIN  Take 10 mg by mouth once daily.     nitroGLYCERIN 0.4 MG SL tablet  Commonly known as: NITROSTAT  Place 0.4 mg under the tongue every 5 (five) minutes as needed for Chest pain.     olopatadine 0.1 % ophthalmic solution  Commonly known as: PATANOL  Place 1-2 drops into both eyes once daily.     omeprazole 20 MG tablet  Commonly known as: PRILOSEC OTC  Take 20 mg by mouth once daily.     pantoprazole 40 MG tablet  Commonly known as: PROTONIX  Take 1 tablet (40 mg total) by mouth once daily.     predniSONE 20 MG tablet  Commonly known as: DELTASONE  Take 2 tablets (40 mg total) by mouth once daily. for 5 days     tadalafiL 5 MG tablet  Commonly known as: CIALIS  Take 5 mg by mouth daily as needed.     tamsulosin 0.4 mg Cap  Commonly known as: FLOMAX  Take 0.4 mg by mouth once daily.     TRELEGY ELLIPTA 100-62.5-25 mcg Dsdv  Generic drug: fluticasone-umeclidin-vilanter  Inhale 1 puff into the lungs once daily.              Significant Diagnostic Studies: Labs: BMP: No results for input(s): "GLU", "NA", "K", "CL", "CO2", "BUN", "CREATININE", "CALCIUM", "MG" in the last 48 hours.    Pending Diagnostic Studies:       None          Indwelling Lines/Drains at time of discharge:   Lines/Drains/Airways       None                   Time spent on the discharge of patient: 35 minutes         Mike Mcgovern MD  Department of Hospital Medicine  [facility]      "

## 2025-06-07 NOTE — PROGRESS NOTES
[facility] Medicine  Progress Note    Patient Name: Babak Scales  MRN: [id number]  Patient Class: IP- Inpatient   Admission Date: 6/3/2025  Length of Stay: 3 days  Attending Physician: Mika Stone MD  Primary Care Provider: Guillermo Brown Jr., MD        Subjective:     Principal Problem:COPD exacerbation    Interval History: Mr. Babak Scales is a 79-year-old male presenting to the emergency department with persistent nasal congestion and cough for the past 4 days. He was evaluated in the ED yesterday for similar symptoms and discharged with a prescription for oral corticosteroids and antibiotics; however, he reports no clinical improvement and returns today due to ongoing discomfort. He denies chest pain, fever, or chills. Given the lack of response to outpatient therapy, he is being admitted under observation status for management of a presumed COPD/asthma exacerbation.     Today: Patient remains short of breath. Will obtain CT chest without contrast for further evaluation. Initiating acetylcysteine (Mucomyst) and obtaining an arterial blood gas. Pulmonary Telemedicine consultation has been requested for further management recommendations. ECHO also ordered as well as CIS consultation.      6/5/2025  Sob better today      6/6/2025  Feels better today  O2 sats stable on 2 L NC    6/7/2025: Remains with cough and dyspnea with speaking. Long h/o COPD and followed by Pulm in [place]. Likely triggered by human metapneumovirus. Continue present corticosteroids and nebulized bronchodilators.     Review of Systems  Objective:     Vital Signs (Most Recent):  Temp: 97.8 °F (36.6 °C) (06/07/25 0718)  Pulse: 96 (06/07/25 0758)  Resp: 18 (06/07/25 0758)  BP: 127/62 (06/07/25 0718)  SpO2: 96 % (06/07/25 0758) Vital Signs (24h Range):  Temp:  [97.8 °F (36.6 °C)-98.4 °F (36.9 °C)] 97.8 °F (36.6 °C)  Pulse:  [78-96] 96  Resp:  [18-20] 18  SpO2:  [90 %-97 %] 96 %  BP:  (112-136)/(62-75) 127/62     Weight: 91.1 kg (200 lb 13.4 oz)  Body mass index is 28.82 kg/m².    Intake/Output Summary (Last 24 hours) at 6/7/2025 0831  Last data filed at 6/6/2025 2020  Gross per 24 hour   Intake 600 ml   Output --   Net 600 ml      Physical Exam    GENERAL: Awake and in NAD  LUNGS: CTA B/L  CVS: Normal rate  GI/: Soft, NT, bowel sounds positive.  EXTREMITIES: No peripheral edema  NEURO: AAOx3  PSYCH: Cooperative    Significant Labs: All pertinent labs within the past 24 hours have been reviewed.    Significant Imaging: I have reviewed all pertinent imaging results/findings within the past 24 hours.    Assessment/Plan:     COPD/Asthma Exacerbation  Persistent cough and nasal congestion for 4 days with minimal response to outpatient steroids and antibiotics.  Albuterol-Ipratropium nebs Q6H scheduled and PRN  Budesonide nebulizer BID  Prednisone taper started on 6/5 --> continue slow taper as still with significant wheezing  Resp viral panel with Human Metapneumovirus (+) --> droplet precautions  Resp culture normal zuly   CT chest with scattered nodules: inflammatory/infectious  s/p 3 days of levofloxacin --> can stop           Continuous O2 with titration to maintain SpO? >= 90%  Incentive spirometry Q4H while awake  NIPPV if worsening resp status/work of breathing    Atrial Fibrillation             Currently sinus rhythm; monitor on cardiac telemetry                      Continue amiodarone 200 mg PO daily continued  Avoid macrolides and other QT-prolonging agents  H/o TIARA closure device    HFrEF (40%)           No e/o current acute decompensation           Continue entresto 24/26 bid and furosemide           Uncertain why not on B-blocker, MRA, or SGLT2i for GDMT, but will defer to CIS  CAD/Post-MI  Continue aspirin 81 mg (noted GI intolerance, monitor for symptoms)  Continue clopidogrel 75 mg daily  Continue atorvastatin 40 mg nightly  DVT Prophylaxis  Enoxaparin 40 mg SC daily  initiated  SCDs placed  GERD/Peptic Ulcer Disease  Pantoprazole 40 mg PO daily  Antacid PRN (aluminum-magnesium-simethicone)  Thyroid Disease  Continue levothyroxine 50 mcg PO daily  Other Comorbidities:  BPH ? Tamsulosin 0.4 mg PO daily continued  Hyperlipidemia ? Continue statin  Obesity, osteoarthritis ? PT/OT to evaluate and treat  History of insomnia ? Melatonin 9 mg PRN at bedtime  Pain Management  Acetaminophen 650 mg PO Q4H PRN mild pain/fever  Hydrocodone-APAP PRN moderate pain  Morphine IV PRN for severe pain  Naloxone ordered PRN for reversal  Diabetes Monitoring  POCT glucose 4x/day  Insulin aspart sliding scale PRN  Nutrition  Heart healthy diet initiated  Multivitamin PO daily  Code Status: Full  Patient is alert, oriented, and capable of participating in medical decision-making  Disposition Plan  Observation admission; will reassess response to therapy within 24-48 hours  Will consider stepdown to oral steroids/bronchodilators as tolerated  Discharge planning contingent on clinical improvement and oxygen needs  Active Diagnoses:    Diagnosis Date Noted POA    PRINCIPAL PROBLEM:  COPD exacerbation [J44.1] 06/03/2025 Yes      Problems Resolved During this Admission:     VTE Risk Mitigation (From admission, onward)           Ordered     enoxaparin injection 40 mg  Daily         06/03/25 1126     IP VTE HIGH RISK PATIENT  Once         06/03/25 1126     Place sequential compression device  Until discontinued         06/03/25 1126                       Mike Mcgovern MD  Department of Hospital Medicine   [facility]

## 2025-06-07 NOTE — PLAN OF CARE
Problem: Adult Inpatient Plan of Care  Goal: Plan of Care Review  Outcome: Progressing  Flowsheets (Taken 6/7/2025 0720)  Plan of Care Reviewed With: patient  Goal: Patient-Specific Goal (Individualized)  Outcome: Progressing  Flowsheets (Taken 6/7/2025 0720)  Individualized Care Needs: monitor oxygen, supplemental o2 , keep o2 between 88-92%, IV ABX, monitor blood sugar, oral steroid  Anxieties, Fears or Concerns: cough  Patient/Family-Specific Goals (Include Timeframe): improvement in condition by discharge  Goal: Absence of Hospital-Acquired Illness or Injury  Outcome: Progressing  Intervention: Prevent Skin Injury  Flowsheets (Taken 6/7/2025 0720)  Body Position: sitting up in bed  Goal: Optimal Comfort and Wellbeing  Outcome: Progressing  Goal: Readiness for Transition of Care  Outcome: Progressing     Problem: Infection  Goal: Absence of Infection Signs and Symptoms  Outcome: Progressing     Problem: Fall Injury Risk  Goal: Absence of Fall and Fall-Related Injury  Outcome: Progressing  Intervention: Identify and Manage Contributors  Flowsheets (Taken 6/7/2025 0720)  Self-Care Promotion:   independence encouraged   BADL personal objects within reach   BADL personal routines maintained   meal set-up provided   adaptive equipment use encouraged     Problem: Airway Clearance Ineffective  Goal: Effective Airway Clearance  Outcome: Progressing  Intervention: Promote Airway Secretion Clearance  Flowsheets (Taken 6/7/2025 0720)  Cough And Deep Breathing: done independently per patient

## 2025-06-08 LAB
BACTERIA SPEC CULT: ABNORMAL
GRAM STN SPEC: ABNORMAL
POCT GLUCOSE: 132 MG/DL (ref 70–110)
POCT GLUCOSE: 81 MG/DL (ref 70–110)

## 2025-06-08 PROCEDURE — 99900035 HC TECH TIME PER 15 MIN (STAT)

## 2025-06-08 PROCEDURE — 27000221 HC OXYGEN, UP TO 24 HOURS

## 2025-06-08 PROCEDURE — 94664 DEMO&/EVAL PT USE INHALER: CPT

## 2025-06-08 PROCEDURE — 25000003 PHARM REV CODE 250: Performed by: INTERNAL MEDICINE

## 2025-06-08 PROCEDURE — 94799 UNLISTED PULMONARY SVC/PX: CPT

## 2025-06-08 PROCEDURE — 94640 AIRWAY INHALATION TREATMENT: CPT

## 2025-06-08 PROCEDURE — 25000242 PHARM REV CODE 250 ALT 637 W/ HCPCS: Performed by: INTERNAL MEDICINE

## 2025-06-08 PROCEDURE — 63600175 PHARM REV CODE 636 W HCPCS: Performed by: INTERNAL MEDICINE

## 2025-06-08 PROCEDURE — 11000004 HC SNF PRIVATE

## 2025-06-08 PROCEDURE — 27000207 HC ISOLATION

## 2025-06-08 PROCEDURE — 94760 N-INVAS EAR/PLS OXIMETRY 1: CPT

## 2025-06-08 RX ORDER — DAPAGLIFLOZIN 10 MG/1
10 TABLET, FILM COATED ORAL DAILY
Status: DISCONTINUED | OUTPATIENT
Start: 2025-06-08 | End: 2025-06-09 | Stop reason: HOSPADM

## 2025-06-08 RX ADMIN — ASPIRIN 81 MG: 81 TABLET, COATED ORAL at 09:06

## 2025-06-08 RX ADMIN — SACUBITRIL AND VALSARTAN 1 TABLET: 24; 26 TABLET, FILM COATED ORAL at 09:06

## 2025-06-08 RX ADMIN — IPRATROPIUM BROMIDE AND ALBUTEROL SULFATE 3 ML: 2.5; .5 SOLUTION RESPIRATORY (INHALATION) at 11:06

## 2025-06-08 RX ADMIN — IPRATROPIUM BROMIDE AND ALBUTEROL SULFATE 3 ML: 2.5; .5 SOLUTION RESPIRATORY (INHALATION) at 12:06

## 2025-06-08 RX ADMIN — CLOPIDOGREL BISULFATE 75 MG: 75 TABLET ORAL at 09:06

## 2025-06-08 RX ADMIN — BUDESONIDE 0.5 MG: 0.5 INHALANT RESPIRATORY (INHALATION) at 07:06

## 2025-06-08 RX ADMIN — THERA TABS 1 TABLET: TAB at 09:06

## 2025-06-08 RX ADMIN — IPRATROPIUM BROMIDE AND ALBUTEROL SULFATE 3 ML: 2.5; .5 SOLUTION RESPIRATORY (INHALATION) at 03:06

## 2025-06-08 RX ADMIN — TAMSULOSIN HYDROCHLORIDE 0.4 MG: 0.4 CAPSULE ORAL at 09:06

## 2025-06-08 RX ADMIN — CETIRIZINE HYDROCHLORIDE 10 MG: 10 TABLET, FILM COATED ORAL at 09:06

## 2025-06-08 RX ADMIN — BENZONATATE 100 MG: 100 CAPSULE ORAL at 04:06

## 2025-06-08 RX ADMIN — LEVOTHYROXINE SODIUM 50 MCG: 50 TABLET ORAL at 09:06

## 2025-06-08 RX ADMIN — FUROSEMIDE 20 MG: 20 TABLET ORAL at 06:06

## 2025-06-08 RX ADMIN — Medication 2 CAPSULE: at 11:06

## 2025-06-08 RX ADMIN — AMIODARONE HYDROCHLORIDE 200 MG: 200 TABLET ORAL at 04:06

## 2025-06-08 RX ADMIN — BENZONATATE 100 MG: 100 CAPSULE ORAL at 09:06

## 2025-06-08 RX ADMIN — Medication 2 CAPSULE: at 04:06

## 2025-06-08 RX ADMIN — ATORVASTATIN CALCIUM 40 MG: 40 TABLET, FILM COATED ORAL at 08:06

## 2025-06-08 RX ADMIN — DAPAGLIFLOZIN 10 MG: 10 TABLET, FILM COATED ORAL at 11:06

## 2025-06-08 RX ADMIN — PANTOPRAZOLE SODIUM 40 MG: 40 TABLET, DELAYED RELEASE ORAL at 09:06

## 2025-06-08 RX ADMIN — Medication 2 CAPSULE: at 09:06

## 2025-06-08 RX ADMIN — IPRATROPIUM BROMIDE AND ALBUTEROL SULFATE 3 ML: 2.5; .5 SOLUTION RESPIRATORY (INHALATION) at 07:06

## 2025-06-08 RX ADMIN — PREDNISONE 30 MG: 10 TABLET ORAL at 09:06

## 2025-06-08 RX ADMIN — SACUBITRIL AND VALSARTAN 1 TABLET: 24; 26 TABLET, FILM COATED ORAL at 08:06

## 2025-06-08 NOTE — PROGRESS NOTES
[facility] Medicine  Progress Note    Patient Name: Babak Scales  MRN: [id number]  Patient Class: IP- Swing   Admission Date: 6/7/2025  Length of Stay: 1 days  Attending Physician: Mike Mcgovern MD  Primary Care Provider: Guillermo Brown Jr., MD        Subjective:     Principal Problem:<principal problem not specified>    Interval History: Mr. Babak Scales is a 79-year-old male presenting to the emergency department with persistent nasal congestion and cough for the past 4 days. He was evaluated in the ED yesterday for similar symptoms and discharged with a prescription for oral corticosteroids and antibiotics; however, he reports no clinical improvement and returns today due to ongoing discomfort. He denies chest pain, fever, or chills. Given the lack of response to outpatient therapy, he is being admitted under observation status for management of a presumed COPD/asthma exacerbation.     Today: Patient remains short of breath. Will obtain CT chest without contrast for further evaluation. Initiating acetylcysteine (Mucomyst) and obtaining an arterial blood gas. Pulmonary Telemedicine consultation has been requested for further management recommendations. ECHO also ordered as well as CIS consultation.      6/5/2025  Sob better today      6/6/2025  Feels better today  O2 sats stable on 2 L NC    6/7/2025: Remains with cough and dyspnea with speaking. Long h/o COPD and followed by Pulm in [place]. Likely triggered by human metapneumovirus. Continue present corticosteroids and nebulized bronchodilators.     6/8/2025: Pt states he is feeling a bit better today. Still with cough, but less wheezing and dyspnea. Continue prednisone and bronchodilators. If continues to improve, possible d/c in 2-3 days.     Review of Systems  Objective:     Vital Signs (Most Recent):  Temp: 98.3 °F (36.8 °C) (06/08/25 0813)  Pulse: 72 (06/08/25 0813)  Resp: 20 (06/08/25  0738)  BP: 118/65 (06/08/25 0813)  SpO2: (!) 94 % (06/08/25 1022) Vital Signs (24h Range):  Temp:  [97.7 °F (36.5 °C)-98.8 °F (37.1 °C)] 98.3 °F (36.8 °C)  Pulse:  [72-94] 72  Resp:  [18-20] 20  SpO2:  [88 %-99 %] 94 %  BP: (109-133)/(61-72) 118/65     Weight: 91.1 kg (200 lb 13.4 oz)  Body mass index is 28.82 kg/m².    Intake/Output Summary (Last 24 hours) at 6/8/2025 1025  Last data filed at 6/8/2025 0408  Gross per 24 hour   Intake 960 ml   Output --   Net 960 ml      Physical Exam    GENERAL: Awake and in NAD  LUNGS: CTA B/L  CVS: Normal rate  GI/: Soft, NT, bowel sounds positive.  EXTREMITIES: No peripheral edema  NEURO: AAOx3  PSYCH: Cooperative    Significant Labs: All pertinent labs within the past 24 hours have been reviewed.    Significant Imaging: I have reviewed all pertinent imaging results/findings within the past 24 hours.    Assessment/Plan:     COPD/Asthma Exacerbation  Persistent cough and nasal congestion for 4 days with minimal response to outpatient steroids and antibiotics.  Albuterol-Ipratropium nebs Q6H scheduled and PRN  Budesonide nebulizer BID  Prednisone taper started on 6/5 --> continue slow taper a  Resp viral panel with Human Metapneumovirus (+) --> droplet precautions  Resp culture normal zuly   CT chest with scattered nodules: inflammatory/infectious  s/p 3 days of levofloxacin --> stopped          Continuous O2 with titration to maintain SpO? >= 90%  Incentive spirometry Q4H while awake  NIPPV if worsening resp status/work of breathing    Atrial Fibrillation             Currently sinus rhythm; monitor on cardiac telemetry                      Continue amiodarone 200 mg PO daily continued  Avoid macrolides and other QT-prolonging agents  H/o TIARA closure device, so no DOAC  HFrEF (40%)           No e/o current acute decompensation           Continue entresto 24/26 bid and furosemide           Uncertain why not on B-blocker, MRA, or SGLT2i for GDMT           Will start Farxiga 10 mg  daily  CAD/Post-MI  Continue aspirin 81 mg (noted GI intolerance, monitor for symptoms)  Continue clopidogrel 75 mg daily  Continue atorvastatin 40 mg nightly  DVT Prophylaxis  Enoxaparin 40 mg SC daily initiated  SCDs placed  GERD/Peptic Ulcer Disease  Pantoprazole 40 mg PO daily  Antacid PRN (aluminum-magnesium-simethicone)  Thyroid Disease  Continue levothyroxine 50 mcg PO daily  Other Comorbidities:  BPH ? Tamsulosin 0.4 mg PO daily continued  Hyperlipidemia ? Continue statin  Obesity, osteoarthritis ? PT/OT to evaluate and treat  History of insomnia ? Melatonin 9 mg PRN at bedtime  Pain Management  Acetaminophen 650 mg PO Q4H PRN mild pain/fever  Hydrocodone-APAP PRN moderate pain  Morphine IV PRN for severe pain  Naloxone ordered PRN for reversal  Diabetes Monitoring  POCT glucose 4x/day  Insulin aspart sliding scale PRN  Starting farxiga for HFrEF and DM2  Nutrition  Heart healthy diet initiated  Multivitamin PO daily  Code Status: Full  Patient is alert, oriented, and capable of participating in medical decision-making  Disposition Plan  Observation admission; will reassess response to therapy within 24-48 hours  Will consider stepdown to oral steroids/bronchodilators as tolerated  Discharge planning contingent on clinical improvement and oxygen needs  There are no hospital problems to display for this patient.    VTE Risk Mitigation (From admission, onward)           Ordered     enoxaparin injection 40 mg  Daily         06/07/25 1017     IP VTE HIGH RISK PATIENT  Once         06/07/25 1017     Place sequential compression device  Until discontinued         06/07/25 1017                       Mike Mcgovern MD  Department of Hospital Medicine   [facility]

## 2025-06-08 NOTE — PLAN OF CARE
Problem: Adult Inpatient Plan of Care  Goal: Plan of Care Review  Outcome: Progressing  Flowsheets (Taken 6/8/2025 0807)  Plan of Care Reviewed With: patient  Goal: Patient-Specific Goal (Individualized)  Outcome: Progressing  Flowsheets (Taken 6/8/2025 0807)  Individualized Care Needs: supplemental oxygen, CBGs, tele monitor  Anxieties, Fears or Concerns: none expressed at this time  Patient/Family-Specific Goals (Include Timeframe): reduction in cough by wednesday  Goal: Absence of Hospital-Acquired Illness or Injury  Outcome: Progressing  Intervention: Prevent Skin Injury  Flowsheets (Taken 6/8/2025 0807)  Body Position: sitting up in bed  Device Skin Pressure Protection:   absorbent pad utilized/changed   skin-to-skin areas padded   skin-to-device areas padded   tubing/devices free from skin contact   positioning supports utilized  Goal: Optimal Comfort and Wellbeing  Outcome: Progressing  Goal: Readiness for Transition of Care  Outcome: Progressing  Intervention: Mutually Develop Transition Plan  Flowsheets (Taken 6/8/2025 0811)  Communicated FREDO with patient/caregiver: Date not available/Unable to determine  Do you expect to return to your current living situation?: Yes  Do you have help at home or someone to help you manage your care at home?: Yes  Readmission within 30 days?: No  Do you currently have service(s) that help you manage your care at home?: No     Problem: Infection  Goal: Absence of Infection Signs and Symptoms  Outcome: Progressing     Problem: Fall Injury Risk  Goal: Absence of Fall and Fall-Related Injury  Outcome: Progressing  Intervention: Identify and Manage Contributors  Flowsheets (Taken 6/8/2025 0807)  Self-Care Promotion:   independence encouraged   BADL personal objects within reach   BADL personal routines maintained   meal set-up provided   adaptive equipment use encouraged     Problem: Airway Clearance Ineffective  Goal: Effective Airway Clearance  Outcome: Progressing

## 2025-06-08 NOTE — PLAN OF CARE
Problem: Adult Inpatient Plan of Care  Goal: Plan of Care Review  Outcome: Progressing  Flowsheets (Taken 6/7/2025 2000)  Plan of Care Reviewed With: patient  Goal: Patient-Specific Goal (Individualized)  Outcome: Progressing  Flowsheets (Taken 6/7/2025 2000)  Individualized Care Needs: Educate on need for oxygen therapy, Tele and sat monitoring  Anxieties, Fears or Concerns: Pt does not like having to wear O2. He reports that it burns/dries out his nose.  Patient/Family-Specific Goals (Include Timeframe): Improved condition by discharge  Goal: Optimal Comfort and Wellbeing  Outcome: Progressing  Intervention: Monitor Pain and Promote Comfort  Flowsheets (Taken 6/7/2025 2000)  Pain Management Interventions:   quiet environment facilitated   care clustered     Problem: Airway Clearance Ineffective  Goal: Effective Airway Clearance  Outcome: Progressing  Intervention: Promote Airway Secretion Clearance  Flowsheets (Taken 6/7/2025 2000)  Breathing Techniques/Airway Clearance: deep/controlled cough encouraged  Cough And Deep Breathing: done independently per patient

## 2025-06-09 VITALS
SYSTOLIC BLOOD PRESSURE: 137 MMHG | OXYGEN SATURATION: 98 % | RESPIRATION RATE: 18 BRPM | HEART RATE: 81 BPM | WEIGHT: 198.19 LBS | BODY MASS INDEX: 28.37 KG/M2 | HEIGHT: 70 IN | TEMPERATURE: 99 F | DIASTOLIC BLOOD PRESSURE: 71 MMHG

## 2025-06-09 PROBLEM — I50.43 ACUTE ON CHRONIC COMBINED SYSTOLIC AND DIASTOLIC CONGESTIVE HEART FAILURE: Status: ACTIVE | Noted: 2025-06-09

## 2025-06-09 PROBLEM — J20.9 ACUTE BRONCHITIS WITH CHRONIC OBSTRUCTIVE PULMONARY DISEASE (COPD): Status: ACTIVE | Noted: 2025-06-03

## 2025-06-09 PROBLEM — J12.3 HUMAN METAPNEUMOVIRUS (HMPV) PNEUMONIA: Status: ACTIVE | Noted: 2025-06-09

## 2025-06-09 PROBLEM — J44.0 ACUTE BRONCHITIS WITH CHRONIC OBSTRUCTIVE PULMONARY DISEASE (COPD): Status: ACTIVE | Noted: 2025-06-03

## 2025-06-09 PROBLEM — J15.4 STREPTOCOCCAL PNEUMONIA: Status: ACTIVE | Noted: 2025-06-09

## 2025-06-09 LAB — POCT GLUCOSE: 98 MG/DL (ref 70–110)

## 2025-06-09 PROCEDURE — 94760 N-INVAS EAR/PLS OXIMETRY 1: CPT

## 2025-06-09 PROCEDURE — 25000242 PHARM REV CODE 250 ALT 637 W/ HCPCS: Performed by: INTERNAL MEDICINE

## 2025-06-09 PROCEDURE — 63600175 PHARM REV CODE 636 W HCPCS: Performed by: INTERNAL MEDICINE

## 2025-06-09 PROCEDURE — 94640 AIRWAY INHALATION TREATMENT: CPT

## 2025-06-09 PROCEDURE — 25000003 PHARM REV CODE 250: Performed by: INTERNAL MEDICINE

## 2025-06-09 RX ORDER — SACUBITRIL AND VALSARTAN 24; 26 MG/1; MG/1
1 TABLET, FILM COATED ORAL 2 TIMES DAILY
Qty: 60 TABLET | Refills: 12 | Status: SHIPPED | OUTPATIENT
Start: 2025-06-09

## 2025-06-09 RX ORDER — METHYLPREDNISOLONE 4 MG/1
TABLET ORAL
Qty: 21 EACH | Refills: 0 | Status: SHIPPED | OUTPATIENT
Start: 2025-06-09

## 2025-06-09 RX ORDER — IPRATROPIUM BROMIDE AND ALBUTEROL SULFATE 2.5; .5 MG/3ML; MG/3ML
3 SOLUTION RESPIRATORY (INHALATION) EVERY 6 HOURS PRN
Qty: 150 ML | Refills: 6 | Status: SHIPPED | OUTPATIENT
Start: 2025-06-09 | End: 2026-06-09

## 2025-06-09 RX ORDER — DAPAGLIFLOZIN 10 MG/1
10 TABLET, FILM COATED ORAL DAILY
Qty: 30 TABLET | Refills: 1 | Status: SHIPPED | OUTPATIENT
Start: 2025-06-10 | End: 2025-08-09

## 2025-06-09 RX ADMIN — Medication 2 CAPSULE: at 08:06

## 2025-06-09 RX ADMIN — BUDESONIDE 0.5 MG: 0.5 INHALANT RESPIRATORY (INHALATION) at 07:06

## 2025-06-09 RX ADMIN — BENZONATATE 100 MG: 100 CAPSULE ORAL at 08:06

## 2025-06-09 RX ADMIN — SACUBITRIL AND VALSARTAN 1 TABLET: 24; 26 TABLET, FILM COATED ORAL at 08:06

## 2025-06-09 RX ADMIN — ASPIRIN 81 MG: 81 TABLET, COATED ORAL at 08:06

## 2025-06-09 RX ADMIN — LEVOTHYROXINE SODIUM 50 MCG: 50 TABLET ORAL at 08:06

## 2025-06-09 RX ADMIN — TAMSULOSIN HYDROCHLORIDE 0.4 MG: 0.4 CAPSULE ORAL at 08:06

## 2025-06-09 RX ADMIN — PANTOPRAZOLE SODIUM 40 MG: 40 TABLET, DELAYED RELEASE ORAL at 08:06

## 2025-06-09 RX ADMIN — CLOPIDOGREL BISULFATE 75 MG: 75 TABLET ORAL at 08:06

## 2025-06-09 RX ADMIN — FUROSEMIDE 20 MG: 20 TABLET ORAL at 06:06

## 2025-06-09 RX ADMIN — IPRATROPIUM BROMIDE AND ALBUTEROL SULFATE 3 ML: 2.5; .5 SOLUTION RESPIRATORY (INHALATION) at 12:06

## 2025-06-09 RX ADMIN — IPRATROPIUM BROMIDE AND ALBUTEROL SULFATE 3 ML: 2.5; .5 SOLUTION RESPIRATORY (INHALATION) at 07:06

## 2025-06-09 RX ADMIN — THERA TABS 1 TABLET: TAB at 08:06

## 2025-06-09 RX ADMIN — DAPAGLIFLOZIN 10 MG: 10 TABLET, FILM COATED ORAL at 08:06

## 2025-06-09 RX ADMIN — PREDNISONE 30 MG: 10 TABLET ORAL at 08:06

## 2025-06-09 RX ADMIN — CETIRIZINE HYDROCHLORIDE 10 MG: 10 TABLET, FILM COATED ORAL at 08:06

## 2025-06-09 NOTE — PLAN OF CARE
06/09/25 1234   Final Note   Assessment Type Final Discharge Note   Anticipated Discharge Disposition Home   What phone number can be called within the next 1-3 days to see how you are doing after discharge? [phone]   Hospital Resources/Appts/Education Provided Provided education on problems/symptoms using teachback   Post-Acute Status   Discharge Delays None known at this time

## 2025-06-09 NOTE — HPI
Mr. Babak Scales is a 79-year-old male presenting to the emergency department with persistent nasal congestion and cough for the past 4 days. He was evaluated in the ED yesterday for similar symptoms and discharged with a prescription for oral corticosteroids and antibiotics; however, he reports no clinical improvement and returns today due to ongoing discomfort. He denies chest pain, fever, or chills. Given the lack of response to outpatient therapy, he is being admitted under observation status for management of a presumed COPD/asthma exacerbation.     Today: Patient remains short of breath. Will obtain CT chest without contrast for further evaluation. Initiating acetylcysteine (Mucomyst) and obtaining an arterial blood gas. Pulmonary Telemedicine consultation has been requested for further management recommendations. ECHO also ordered as well as CIS consultation.

## 2025-06-09 NOTE — PLAN OF CARE
Problem: Adult Inpatient Plan of Care  Goal: Plan of Care Review  Outcome: Progressing  Flowsheets (Taken 6/8/2025 1938)  Plan of Care Reviewed With: patient  Goal: Patient-Specific Goal (Individualized)  Outcome: Progressing  Flowsheets (Taken 6/8/2025 1938)  Individualized Care Needs: monitor blood glucose, tele/pulse ox monitoring, Prednisone taper, monitor O2 sats, nebulizer treatments, droplet precautions, supplemental oxygen, cough management  Anxieties, Fears or Concerns: none verbalized at this time  Patient/Family-Specific Goals (Include Timeframe): show improvement in condition by discharge  Goal: Absence of Hospital-Acquired Illness or Injury  Outcome: Progressing  Intervention: Identify and Manage Fall Risk  Flowsheets (Taken 6/8/2025 1938)  Safety Promotion/Fall Prevention:   assistive device/personal item within reach   bed alarm set   diversional activities provided   Fall Risk reviewed with patient/family   lighting adjusted   instructed to call staff for mobility   medications reviewed   nonskid shoes/socks when out of bed   supervised activity   toileting scheduled   pulse ox   patient expresses understanding of fall risk and prevention   side rails raised x 2   room near unit station  Intervention: Prevent Skin Injury  Flowsheets (Taken 6/8/2025 1938)  Body Position:   position changed independently   weight shifting   sitting up in bed  Skin Protection: incontinence pads utilized  Device Skin Pressure Protection:   absorbent pad utilized/changed   adhesive use limited   positioning supports utilized   pressure points protected  Intervention: Prevent and Manage VTE (Venous Thromboembolism) Risk  Flowsheets (Taken 6/8/2025 1938)  VTE Prevention/Management:   ambulation promoted   bleeding precautions maintained   bleeding risk assessed   fluids promoted   ROM (active) performed  Intervention: Prevent Infection  Flowsheets (Taken 6/8/2025 1938)  Infection Prevention:   cohorting utilized    environmental surveillance performed   hand hygiene promoted   rest/sleep promoted   single patient room provided   personal protective equipment utilized   equipment surfaces disinfected  Goal: Optimal Comfort and Wellbeing  Outcome: Progressing  Intervention: Monitor Pain and Promote Comfort  Flowsheets (Taken 6/8/2025 1938)  Pain Management Interventions:   care clustered   diversional activity provided   pain management plan reviewed with patient/caregiver   medication offered   position adjusted   pillow support provided   quiet environment facilitated   relaxation techniques promoted  Intervention: Provide Person-Centered Care  Flowsheets (Taken 6/8/2025 1938)  Trust Relationship/Rapport:   care explained   choices provided   emotional support provided   empathic listening provided   questions encouraged   questions answered   reassurance provided   thoughts/feelings acknowledged     Problem: Infection  Goal: Absence of Infection Signs and Symptoms  Outcome: Progressing  Intervention: Prevent or Manage Infection  Flowsheets (Taken 6/8/2025 1938)  Fever Reduction/Comfort Measures:   humidification temperature decreased   lightweight bedding  Infection Management: aseptic technique maintained  Isolation Precautions:   droplet   precautions maintained     Problem: Airway Clearance Ineffective  Goal: Effective Airway Clearance  Outcome: Progressing  Intervention: Promote Airway Secretion Clearance  Flowsheets (Taken 6/8/2025 1938)  Breathing Techniques/Airway Clearance: deep/controlled cough encouraged  Cough And Deep Breathing: done independently per patient  Activity Management: Rolling - L1

## 2025-06-09 NOTE — PLAN OF CARE
Problem: Adult Inpatient Plan of Care  Goal: Plan of Care Review  Outcome: Progressing  Flowsheets (Taken 6/9/2025 0800)  Plan of Care Reviewed With: patient  Goal: Patient-Specific Goal (Individualized)  Outcome: Progressing  Flowsheets (Taken 6/9/2025 0800)  Individualized Care Needs: monitor blood sugar, tele/monitor, prednisone taper, monitor 02, maintain droplet precutions, O2 as needed, monitor cough and give PRN  Anxieties, Fears or Concerns: none expressed at this time  Patient/Family-Specific Goals (Include Timeframe): complete treatment by FREDO  Goal: Absence of Hospital-Acquired Illness or Injury  Outcome: Progressing  Intervention: Prevent Skin Injury  Flowsheets (Taken 6/9/2025 0800)  Body Position: position maintained  Skin Protection: drying agents applied  Goal: Optimal Comfort and Wellbeing  Outcome: Progressing  Goal: Readiness for Transition of Care  Outcome: Progressing     Problem: Infection  Goal: Absence of Infection Signs and Symptoms  Outcome: Progressing     Problem: Fall Injury Risk  Goal: Absence of Fall and Fall-Related Injury  Outcome: Progressing  Intervention: Identify and Manage Contributors  Flowsheets (Taken 6/9/2025 0800)  Self-Care Promotion:   independence encouraged   BADL personal objects within reach   BADL personal routines maintained   meal set-up provided   adaptive equipment use encouraged     Problem: Airway Clearance Ineffective  Goal: Effective Airway Clearance  Outcome: Progressing

## 2025-06-09 NOTE — PROGRESS NOTES
Inpatient Nutrition Assessment    Admit Date: 6/7/2025   Total duration of encounter: 2 days   Patient Age: 79 y.o.    Nutrition Recommendation/Prescription     Continue heart healthy  diet  Continue multivitamin PO daily  Monitor intake, wt, labs,medications  Continue weekly weights    Communication of Recommendations: reviewed with nurse    Nutrition Assessment     Malnutrition Assessment/Nutrition-Focused Physical Exam       Malnutrition Level: other (see comments) (Does not meet criteria) (06/09/25 1039)  Energy Intake (Malnutrition): other (see comments) (Does not meet criteria) (06/09/25 1039)  Weight Loss (Malnutrition): other (see comments) (Unable to assess) (06/09/25 1039)                                         Fluid Accumulation (Malnutrition): other (see comments) (Unable to assess) (06/09/25 1039)     Hand  Strength, Right (Malnutrition): Unable to assess (06/09/25 1039)  A minimum of two characteristics is recommended for diagnosis of either severe or non-severe malnutrition.    Chart Review    Reason Seen: continuous nutrition monitoring and length of stay    Malnutrition Screening Tool Results   Have you recently lost weight without trying?: No  Have you been eating poorly because of a decreased appetite?: No   MST Score: 0   Diagnosis:  COPD/Asthma Exacerbation  History of Atrial Fibrillation on Amiodaron   History of Atrial Fibrillation on Amiodaron   CAD/Post MI  DVT prophylaxis  GERD/Peptic Ulcer Disease  Thyroid disease  Other Comorbidities  Paint Management  Diabetes Monitoring  Nutrition: heart healthy diet, multivitamin PO daily      Arthritis      Asthma      Atrial fibrillation       xarelto    Bladder cancer      Bladder disorder      CAD (coronary artery disease)      CHF (congestive heart failure)      COPD (chronic obstructive pulmonary disease)      Diverticulitis      Enlarged prostate      GERD (gastroesophageal reflux disease)      Heart damage      History of kidney stones       HLD (hyperlipidemia)      [place] (hard of hearing)      Insomnia      Obesity      Skin cancer      SOB (shortness of breath)      Stroke      Thyroid disease      Relevant Medical History:     Scheduled Medications:  albuterol-ipratropium, 3 mL, Q4H  amiodarone, 200 mg, Daily  aspirin, 81 mg, Daily  atorvastatin, 40 mg, QHS  budesonide, 0.5 mg, Q12H  cetirizine, 10 mg, Daily  clopidogreL, 75 mg, Daily  dapagliflozin propanediol, 10 mg, Daily  enoxparin, 40 mg, Daily  furosemide, 20 mg, QAM  Lactobacillus acidophilus, 2 capsule, TID WM  levothyroxine, 50 mcg, Daily  multivitamin, 1 tablet, Daily  pantoprazole, 40 mg, Daily  predniSONE, 30 mg, Daily   Followed by  [START ON 6/11/2025] predniSONE, 20 mg, Daily  sacubitriL-valsartan, 1 tablet, BID  tamsulosin, 0.4 mg, Daily    Continuous Infusions:   PRN Medications:  acetaminophen, 650 mg, Q4H PRN  albuterol-ipratropium, 3 mL, Q6H PRN  aluminum-magnesium hydroxide-simethicone, 30 mL, QID PRN  benzonatate, 100 mg, TID PRN  bisacodyL, 10 mg, Daily PRN  dextrose 50%, 12.5 g, PRN  dextrose 50%, 25 g, PRN  glucagon (human recombinant), 1 mg, PRN  glucose, 16 g, PRN  glucose, 24 g, PRN  guaiFENesin-codeine 100-10 mg/5 ml, 5 mL, Q4H PRN  HYDROcodone-acetaminophen, 1 tablet, Q6H PRN  insulin aspart U-100, 0-10 Units, QID (AC + HS) PRN  melatonin, 9 mg, Nightly PRN  morphine, 2 mg, Q6H PRN  naloxone, 0.02 mg, PRN  ondansetron, 8 mg, Q8H PRN  ondansetron, 4 mg, Q8H PRN  polyethylene glycol, 17 g, TID PRN  simethicone, 1 tablet, QID PRN  sodium chloride 0.9%, 10 mL, PRN    Calorie Containing IV Medications: no significant kcals from medications at this time    Recent Labs   Lab 06/03/25  0954 06/03/25  0957 06/04/25 0604 06/04/25 0606   NA  --  140 140  --    K  --  3.9 4.5  --    CALCIUM  --  9.0 8.5*  --    PHOS  --   --  2.8  --    MG  --   --  2.30  --    CL  --  104 105  --    CO2  --  27 30  --    BUN  --  19.7 19.9  --    CREATININE  --  0.70* 0.64*  --    EGFRNORACEVR   "--  >60 >60  --    GLU  --  108 148*  --    BILITOT  --  1.1 1.0  --    ALKPHOS  --  69 60  --    ALT  --  13 15  --    AST  --  24 25  --    ALBUMIN  --  3.6 3.1*  --    WBC 19.61*  --   --  15.54*   HGB 12.9*  --   --  11.9*   HCT 38.9*  --   --  36.2*     Nutrition Orders:  Diet Heart Healthy      Appetite/Oral Intake: good/% of meals  Factors Affecting Nutritional Intake: none identified  Social Needs Impacting Access to Food: none identified  Food/Jain/Cultural Preferences: none reported  Food Allergies: none reported  Last Bowel Movement: 06/08/25  Wound(s):      Comments    06/06/25: Spoke with nursing. Pt intake is good. Pt ate outside food, hamburger, for lunch. Labs/medications review. +BM per nursing 06/06/25. PO intake in flowsheet 100% of meals. Will monitor.     06/09/25: Pt admitted to swing bed. Spoke with CNA. Pt intake is good. +BM per CNA. Labs/medications review. PO intake flowsheet 100% of meal. Will monitor.     Anthropometrics    Height: 5' 10" (177.8 cm), Height Method: Stated  Last Weight: 89.9 kg (198 lb 3.1 oz) (06/09/25 1035), Weight Method: Bed Scale  BMI (Calculated): 28.4  BMI Classification: overweight (BMI 25-29.9)        Ideal Body Weight (IBW), Male: 166 lb     % Ideal Body Weight, Male (lb): 119.4 %                          Usual Weight Provided By: unable to obtain usual weight    Wt Readings from Last 5 Encounters:   06/09/25 89.9 kg (198 lb 3.1 oz)   06/06/25 91.1 kg (200 lb 13.4 oz)   06/02/25 90.7 kg (200 lb)   05/22/25 91.5 kg (201 lb 11.5 oz)   04/14/25 94.7 kg (208 lb 12.4 oz)     Weight Change(s) Since Admission:   Wt Readings from Last 1 Encounters:   06/09/25 1035 89.9 kg (198 lb 3.1 oz)   06/09/25 0615 89.9 kg (198 lb 3.1 oz)   06/07/25 1018 91.1 kg (200 lb 13.4 oz)   Admit Weight: 91.1 kg (200 lb 13.4 oz) (06/07/25 1018), Weight Method: Bed Scale  06/09/25: -1.2 kg wt loss. Will monitor.   Estimated Needs    Weight Used For Calorie Calculations: 89.9 kg " (198 lb 3.1 oz)  Energy Calorie Requirements (kcal): 2247.5 kcal (25 kcal/kg/CBW)  Energy Need Method: Kcal/kg  Weight Used For Protein Calculations: 89.9 kg (198 lb 3.1 oz)  Protein Requirements: 90.09 gm (1.0 gm/kg/CBW)  Fluid Requirements (mL): 2247.5 mL (1 mL/kcal)        Enteral Nutrition     Patient not receiving enteral nutrition at this time.    Parenteral Nutrition     Patient not receiving parenteral nutrition support at this time.    Evaluation of Received Nutrient Intake    Calories: meeting estimated needs  Protein: meeting estimated needs    Patient Education     Not applicable.    Nutrition Diagnosis     PES: No nutrition diagnosis at this time     PES:            Nutrition Interventions     Intervention(s): general/healthful diet  Intervention(s):      Goal: Consume % of meals/snacks by follow-up. (goal progressing)  Goal: Maintain weight throughout hospitalization. (goal progressing)    Nutrition Goals & Monitoring     Dietitian will monitor: food and beverage intake, weight, weight change, electrolyte/renal panel, glucose/endocrine profile, and gastrointestinal profile  Discharge planning: continue heart healthy diet  Nutrition Risk/Follow-Up: dietitian will follow-up one time per week   Please consult if re-assessment needed sooner.

## 2025-06-09 NOTE — HOSPITAL COURSE
6/5/2025  Sob better today      6/6/2025  Feels better today  O2 sats stable on 2 L NC     6/7/2025: Remains with cough and dyspnea with speaking. Long h/o COPD and followed by Pulm in Home. Likely triggered by human metapneumovirus. Continue present corticosteroids and nebulized bronchodilators.      6/8/2025: Pt states he is feeling a bit better today. Still with cough, but less wheezing and dyspnea. Continue prednisone and bronchodilators. If continues to improve, possible d/c in 2-3 days.     06/09/2025 DISCHARGE SUMMARY: pt initially admited with SOB found to have + PCR for human metapneumonia virus.  Sputum cx also + for strep pneumonia.  He was treated with iv abx and steroids and nebs, has improved back to his baseline  Also note CHF and h/o CHF EF 40% on echo with mild valvular dysfunction .  He was not taking his entresto.  He was started back on entresto here and added Farxiga for GDMT and diuresis.  He is feeling better, still with a few wheezes but feels he is back to his baseline.  He will be d/c home with steroids taper, resume entresto and added farxiga.  Will schedule f/u with Dr. Obey Black for pulmonology, Dr. Gil for cardiology and Dr. More for PCP.  Pt would benefit from sleep study as he likely has TERESO, family reports he is a big snorer and pt admits to daytime fatigue.  May need overnight pulse oximetry testing as well.  He will discuss with pulmonology and PCP at the next visit.

## 2025-06-09 NOTE — DISCHARGE SUMMARY
[facility] - Medical Surgical Unit  Hospital Medicine  Discharge Summary      Patient Name: Babak Scales  MRN: [id number]  DANNA: [id number]  Patient Class: IP- Swing  Admission Date: 6/7/2025  Hospital Length of Stay: 2 days  Discharge Date and Time: No discharge date for patient encounter.  Attending Physician: Mike Mcgovern MD   Discharging Provider: Marva Schmitz MD  Primary Care Provider: Guillermo Brown Jr., MD    Primary Care Team: Networked reference to record PCT     HPI:   Mr. Babak Scales is a 79-year-old male presenting to the emergency department with persistent nasal congestion and cough for the past 4 days. He was evaluated in the ED yesterday for similar symptoms and discharged with a prescription for oral corticosteroids and antibiotics; however, he reports no clinical improvement and returns today due to ongoing discomfort. He denies chest pain, fever, or chills. Given the lack of response to outpatient therapy, he is being admitted under observation status for management of a presumed COPD/asthma exacerbation.     Today: Patient remains short of breath. Will obtain CT chest without contrast for further evaluation. Initiating acetylcysteine (Mucomyst) and obtaining an arterial blood gas. Pulmonary Telemedicine consultation has been requested for further management recommendations. ECHO also ordered as well as CIS consultation.           * No surgery found *      Hospital Course:   6/5/2025  Sob better today      6/6/2025  Feels better today  O2 sats stable on 2 L NC     6/7/2025: Remains with cough and dyspnea with speaking. Long h/o COPD and followed by Pulm in [place]. Likely triggered by human metapneumovirus. Continue present corticosteroids and nebulized bronchodilators.      6/8/2025: Pt states he is feeling a bit better today. Still with cough, but less wheezing and dyspnea. Continue prednisone and bronchodilators. If continues to improve, possible d/c in 2-3 days.      06/09/2025 DISCHARGE SUMMARY: pt initially admited with SOB found to have + PCR for human metapneumonia virus.  Sputum cx also + for strep pneumonia.  He was treated with iv abx and steroids and nebs, has improved back to his baseline  Also note CHF and h/o CHF EF 40% on echo with mild valvular dysfunction .  He was not taking his entresto.  He was started back on entresto here and added Farxiga for GDMT and diuresis.  He is feeling better, still with a few wheezes but feels he is back to his baseline.  He will be d/c home with steroids taper, resume entresto and added farxiga.  Will schedule f/u with Dr. Obey Black for pulmonology, Dr. Gil for cardiology and Dr. More for PCP.  Pt would benefit from sleep study as he likely has TERESO, family reports he is a big snorer and pt admits to daytime fatigue.  May need overnight pulse oximetry testing as well.  He will discuss with pulmonology and PCP at the next visit.        Goals of Care Treatment Preferences:  Code Status: Full Code      SDOH Screening:  The patient was screened for utility difficulties, food insecurity, transport difficulties, housing insecurity, and interpersonal safety and there were no concerns identified this admission.     Consults:     Assessment & Plan  Acute bronchitis with chronic obstructive pulmonary disease (COPD)  Steroid taper at d/c  Continue nebs at home  F/u with pulm  Streptococcal pneumonia  Completed iv abx  F/u with pcp and pulm    Antibiotics (From admission, onward)      None            Microbiology Results (last 7 days)       ** No results found for the last 168 hours. **          Human metapneumovirus (hMPV) pneumonia      improved  Acute on chronic combined systolic and diastolic congestive heart failure  Patient has Combined Systolic and Diastolic heart failure that is Acute on chronic. On presentation their CHF was well compensated. Most recent BNP and echo results are listed below.  No results for input(s):  ""BNP" in the last 72 hours.  Latest ECHO  Results for orders placed during the hospital encounter of 06/03/25    Echo    Interpretation Summary    Left Ventricle: The left ventricle is mildly dilated. Normal wall thickness. Moderate global hypokinesis present. There is moderately reduced systolic function with a visually estimated ejection fraction of 35 - 40%.    Right Ventricle: The right ventricle is normal in size Systolic function is normal.    Mitral Valve: There is mild regurgitation.    Tricuspid Valve: There is mild regurgitation.    Limited view study.    Current Heart Failure Medications  sacubitriL-valsartan 24-26 mg per tablet 1 tablet, 2 times daily, Oral  furosemide tablet 20 mg, Every morning, Oral  dapagliflozin propanediol (Farxiga) tablet 10 mg, Daily, Oral  , Daily, Oral  , 2 times daily, Oral    Plan  D/c home f/u with Dr. Jeffery Khalil and Entresto at d/c       Final Active Diagnoses:    Diagnosis Date Noted POA    PRINCIPAL PROBLEM:  Acute bronchitis with chronic obstructive pulmonary disease (COPD) [J44.0, J20.9] 06/03/2025 Yes    Streptococcal pneumonia [J15.4] 06/09/2025 Yes    Human metapneumovirus (hMPV) pneumonia [J12.3] 06/09/2025 Yes    Acute on chronic combined systolic and diastolic congestive heart failure [I50.43] 06/09/2025 Yes      Problems Resolved During this Admission:       Discharged Condition: good    Disposition: Home or Self Care    Follow Up:   Follow-up Information       Guillermo Brown Jr., MD Follow up on 6/11/2025.    Specialty: Family Medicine  Why: Wednesday, 6/11 @ 9:30 a.m.   Spoke to Megan  Contact information:  [address] [place] [address]  [address]  [facility] [address]  [phone]               Obey Black MD Follow up.    Specialty: Pulmonary Disease  Contact information:  [address] [place] [address] [address]  [facility] [address]  [phone]               Liam Gil MD Follow up.    Specialties: Cardiovascular Disease, Cardiology  Contact " "information:  [address] [facility] [address]  [phone]                           Patient Instructions:      Activity as tolerated       Significant Diagnostic Studies: Labs: CMP No results for input(s): "NA", "K", "CL", "CO2", "GLU", "BUN", "CREATININE", "CALCIUM", "PROT", "ALBUMIN", "BILITOT", "ALKPHOS", "AST", "ALT", "ANIONGAP", "ESTGFRAFRICA", "EGFRNONAA" in the last 48 hours. and CBC No results for input(s): "WBC", "HGB", "HCT", "PLT" in the last 48 hours.    Pending Diagnostic Studies:       None           Medications:  Reconciled Home Medications:      Medication List        START taking these medications      albuterol-ipratropium 2.5 mg-0.5 mg/3 mL nebulizer solution  Commonly known as: DUO-NEB  Take 3 mLs by nebulization every 6 (six) hours as needed for Wheezing. Rescue     dapagliflozin propanediol 10 mg tablet  Commonly known as: Farxiga  Take 1 tablet (10 mg total) by mouth once daily.  Start taking on: Rachel 10, 2025     ENTRESTO 24-26 mg per tablet  Generic drug: sacubitriL-valsartan  Take 1 tablet by mouth 2 (two) times daily.     methylPREDNISolone 4 mg tablet  Commonly known as: MEDROL DOSEPACK  use as directed            CONTINUE taking these medications      albuterol 90 mcg/actuation inhaler  Commonly known as: VENTOLIN HFA  Inhale 2 puffs into the lungs every 6 (six) hours as needed for Wheezing. Rescue     alfuzosin 10 mg Tb24  Commonly known as: UROXATRAL  Take 10 mg by mouth once daily.     amiodarone 200 MG Tab  Commonly known as: PACERONE  Take 1 tablet by mouth Daily.     aspirin 81 MG EC tablet  Commonly known as: ECOTRIN  Take 1 tablet (81 mg total) by mouth once daily.     atorvastatin 40 MG tablet  Commonly known as: LIPITOR  Take 40 mg by mouth every evening.     CENTRUM SILVER --300 mcg Tab  Generic drug: mv-min-folic-K1-lycopen-lutein  Take 1 tablet by mouth once daily.     clopidogreL 75 mg tablet  Commonly known as: PLAVIX  Take 1 " tablet (75 mg total) by mouth once daily.     furosemide 20 MG tablet  Commonly known as: LASIX  Take 20 mg by mouth every morning.     levothyroxine 50 MCG tablet  Commonly known as: SYNTHROID  Take 50 mcg by mouth once daily.     loratadine 10 mg tablet  Commonly known as: CLARITIN  Take 10 mg by mouth once daily.     pantoprazole 40 MG tablet  Commonly known as: PROTONIX  Take 1 tablet (40 mg total) by mouth once daily.     tadalafiL 5 MG tablet  Commonly known as: CIALIS  Take 5 mg by mouth daily as needed.     tamsulosin 0.4 mg Cap  Commonly known as: FLOMAX  Take 0.4 mg by mouth once daily.     TRELEGY ELLIPTA 100-62.5-25 mcg Dsdv  Generic drug: fluticasone-umeclidin-vilanter  Inhale 1 puff into the lungs once daily.            STOP taking these medications      azithromycin 250 MG tablet  Commonly known as: Z-ADIS     levalbuterol 45 mcg/actuation inhaler  Commonly known as: XOPENEX HFA     nitroGLYCERIN 0.4 MG SL tablet  Commonly known as: NITROSTAT     olopatadine 0.1 % ophthalmic solution  Commonly known as: PATANOL     omeprazole 20 MG tablet  Commonly known as: PRILOSEC OTC     predniSONE 20 MG tablet  Commonly known as: DELTASONE              Indwelling Lines/Drains at time of discharge:   Lines/Drains/Airways       None                   Time spent on the discharge of patient: 37 minutes     Patient Screened for food insecurity, housing instability, transportation needs, utility difficulties, and interpersonal safety.  No needs identified    Physical Exam  Vitals and nursing note reviewed.   Constitutional:       General: He is not in acute distress.     Appearance: Normal appearance. He is normal weight. He is not ill-appearing.   HENT:      Head: Normocephalic and atraumatic.   Cardiovascular:      Rate and Rhythm: Normal rate and regular rhythm.      Pulses: Normal pulses.      Heart sounds: Normal heart sounds.   Pulmonary:      Effort: Pulmonary effort is normal.      Breath sounds: Wheezing (few  wheezes, no distress) present.   Abdominal:      General: Abdomen is flat. Bowel sounds are normal.      Palpations: Abdomen is soft.   Musculoskeletal:      Right lower leg: No edema.      Left lower leg: No edema.   Skin:     General: Skin is warm and dry.      Findings: No erythema or rash.   Neurological:      Mental Status: He is alert.   Psychiatric:      Comments: I had a face to face encounter with this patient prior to discharging         Marva Schmitz MD  Department of [facility] Medicine  [facility] - [facility]

## 2025-06-09 NOTE — DISCHARGE INSTRUCTIONS
Please follow all discharge instructions as given. KEEP ALL FOLLOW UP APPOINTMENTS!        If you experience any worsening or NEW signs or symptoms please call your primary care provider or head to your nearest emergency department.           THANK YOU FOR CHOOSING [facility].  If you have any questions please call [phone].

## 2025-06-09 NOTE — NURSING
IV D/C with tip in tact. D/c instructions given to patient with verbalization of understanding. Patient wheeled to private vehicle by hospital staff.

## 2025-06-10 ENCOUNTER — PATIENT OUTREACH (OUTPATIENT)
Dept: ADMINISTRATIVE | Facility: CLINIC | Age: 80
End: 2025-06-10
Payer: MEDICARE

## 2025-06-10 NOTE — PROGRESS NOTES
C3 nurse spoke with Babak Scales  for a TCC post hospital discharge follow up call. The patient has a scheduled [facility] appointment with Guillermo Brown Jr., MD  on tomorrow, 06/11/2025 @ 930 am. Appointment with Dr. Gil on 06/16/2025 @ 915 am. Appointment with Dr. Ruhterford on 06/18/2025. Appointment with Dr. Obey Black on 09/25/2025 @ 1010 am.

## 2025-07-10 ENCOUNTER — HOSPITAL ENCOUNTER (EMERGENCY)
Facility: HOSPITAL | Age: 80
Discharge: ANOTHER HOSPITAL | End: 2025-07-10
Payer: MEDICARE

## 2025-07-10 VITALS
HEIGHT: 69 IN | WEIGHT: 201 LBS | HEART RATE: 98 BPM | SYSTOLIC BLOOD PRESSURE: 101 MMHG | TEMPERATURE: 98 F | RESPIRATION RATE: 20 BRPM | DIASTOLIC BLOOD PRESSURE: 73 MMHG | OXYGEN SATURATION: 93 % | BODY MASS INDEX: 29.77 KG/M2

## 2025-07-10 DIAGNOSIS — R06.00 DYSPNEA AND RESPIRATORY ABNORMALITIES: ICD-10-CM

## 2025-07-10 DIAGNOSIS — J15.3: ICD-10-CM

## 2025-07-10 DIAGNOSIS — I48.11 LONGSTANDING PERSISTENT ATRIAL FIBRILLATION: ICD-10-CM

## 2025-07-10 DIAGNOSIS — R07.1 PAINFUL BREATHING: ICD-10-CM

## 2025-07-10 DIAGNOSIS — R06.89 DYSPNEA AND RESPIRATORY ABNORMALITIES: ICD-10-CM

## 2025-07-10 DIAGNOSIS — R07.9 CHEST PAIN: Primary | ICD-10-CM

## 2025-07-10 LAB
ALBUMIN SERPL-MCNC: 3.6 G/DL (ref 3.4–4.8)
ALBUMIN/GLOB SERPL: 0.9 RATIO (ref 1.1–2)
ALP SERPL-CCNC: 73 UNIT/L (ref 40–150)
ALT SERPL-CCNC: 16 UNIT/L (ref 0–55)
ANION GAP SERPL CALC-SCNC: 7 MEQ/L
AST SERPL-CCNC: 17 UNIT/L (ref 11–45)
BASOPHILS # BLD AUTO: 0.05 X10(3)/MCL
BASOPHILS NFR BLD AUTO: 0.5 %
BILIRUB SERPL-MCNC: 1.3 MG/DL
BNP BLD-MCNC: 105.5 PG/ML
BUN SERPL-MCNC: 12.5 MG/DL (ref 8.4–25.7)
CALCIUM SERPL-MCNC: 9 MG/DL (ref 8.8–10)
CHLORIDE SERPL-SCNC: 104 MMOL/L (ref 98–107)
CO2 SERPL-SCNC: 30 MMOL/L (ref 23–31)
CREAT SERPL-MCNC: 0.74 MG/DL (ref 0.72–1.25)
CREAT/UREA NIT SERPL: 17
EOSINOPHIL # BLD AUTO: 0.08 X10(3)/MCL (ref 0–0.9)
EOSINOPHIL NFR BLD AUTO: 0.7 %
ERYTHROCYTE [DISTWIDTH] IN BLOOD BY AUTOMATED COUNT: 14.6 % (ref 11.5–17)
GFR SERPLBLD CREATININE-BSD FMLA CKD-EPI: >60 ML/MIN/1.73/M2
GLOBULIN SER-MCNC: 4.2 GM/DL (ref 2.4–3.5)
GLUCOSE SERPL-MCNC: 107 MG/DL (ref 82–115)
HCT VFR BLD AUTO: 40.3 % (ref 42–52)
HGB BLD-MCNC: 12.7 G/DL (ref 14–18)
IMM GRANULOCYTES # BLD AUTO: 0.02 X10(3)/MCL (ref 0–0.04)
IMM GRANULOCYTES NFR BLD AUTO: 0.2 %
LACTATE SERPL-SCNC: 1.1 MMOL/L (ref 0.5–2.2)
LYMPHOCYTES # BLD AUTO: 1.5 X10(3)/MCL (ref 0.6–4.6)
LYMPHOCYTES NFR BLD AUTO: 14 %
MAGNESIUM SERPL-MCNC: 1.8 MG/DL (ref 1.6–2.6)
MCH RBC QN AUTO: 29.7 PG (ref 27–31)
MCHC RBC AUTO-ENTMCNC: 31.5 G/DL (ref 33–36)
MCV RBC AUTO: 94.4 FL (ref 80–94)
MONOCYTES # BLD AUTO: 1 X10(3)/MCL (ref 0.1–1.3)
MONOCYTES NFR BLD AUTO: 9.3 %
NEUTROPHILS # BLD AUTO: 8.09 X10(3)/MCL (ref 2.1–9.2)
NEUTROPHILS NFR BLD AUTO: 75.3 %
PLATELET # BLD AUTO: 191 X10(3)/MCL (ref 130–400)
PMV BLD AUTO: 8.6 FL (ref 7.4–10.4)
POTASSIUM SERPL-SCNC: 3.7 MMOL/L (ref 3.5–5.1)
PROT SERPL-MCNC: 7.8 GM/DL (ref 5.8–7.6)
RBC # BLD AUTO: 4.27 X10(6)/MCL (ref 4.7–6.1)
SODIUM SERPL-SCNC: 141 MMOL/L (ref 136–145)
TROPONIN I SERPL-MCNC: 0.04 NG/ML (ref 0–0.04)
WBC # BLD AUTO: 10.74 X10(3)/MCL (ref 4.5–11.5)

## 2025-07-10 PROCEDURE — 83735 ASSAY OF MAGNESIUM: CPT | Performed by: EMERGENCY MEDICINE

## 2025-07-10 PROCEDURE — 93010 ELECTROCARDIOGRAM REPORT: CPT | Mod: ,,, | Performed by: INTERNAL MEDICINE

## 2025-07-10 PROCEDURE — 96375 TX/PRO/DX INJ NEW DRUG ADDON: CPT

## 2025-07-10 PROCEDURE — 87040 BLOOD CULTURE FOR BACTERIA: CPT | Performed by: EMERGENCY MEDICINE

## 2025-07-10 PROCEDURE — 93005 ELECTROCARDIOGRAM TRACING: CPT

## 2025-07-10 PROCEDURE — 80053 COMPREHEN METABOLIC PANEL: CPT

## 2025-07-10 PROCEDURE — 25500020 PHARM REV CODE 255: Performed by: EMERGENCY MEDICINE

## 2025-07-10 PROCEDURE — 63600175 PHARM REV CODE 636 W HCPCS: Mod: JZ,TB | Performed by: EMERGENCY MEDICINE

## 2025-07-10 PROCEDURE — 83880 ASSAY OF NATRIURETIC PEPTIDE: CPT

## 2025-07-10 PROCEDURE — 25000003 PHARM REV CODE 250: Performed by: EMERGENCY MEDICINE

## 2025-07-10 PROCEDURE — 25000242 PHARM REV CODE 250 ALT 637 W/ HCPCS: Performed by: EMERGENCY MEDICINE

## 2025-07-10 PROCEDURE — 84484 ASSAY OF TROPONIN QUANT: CPT

## 2025-07-10 PROCEDURE — 85025 COMPLETE CBC W/AUTO DIFF WBC: CPT

## 2025-07-10 PROCEDURE — 83605 ASSAY OF LACTIC ACID: CPT | Performed by: EMERGENCY MEDICINE

## 2025-07-10 PROCEDURE — 96374 THER/PROPH/DIAG INJ IV PUSH: CPT

## 2025-07-10 PROCEDURE — 94640 AIRWAY INHALATION TREATMENT: CPT

## 2025-07-10 PROCEDURE — 99285 EMERGENCY DEPT VISIT HI MDM: CPT | Mod: 25,27

## 2025-07-10 PROCEDURE — 99285 EMERGENCY DEPT VISIT HI MDM: CPT | Mod: 25

## 2025-07-10 RX ORDER — ACETAMINOPHEN 500 MG
1000 TABLET ORAL
Status: COMPLETED | OUTPATIENT
Start: 2025-07-10 | End: 2025-07-10

## 2025-07-10 RX ORDER — METHYLPREDNISOLONE SOD SUCC 125 MG
125 VIAL (EA) INJECTION
Status: COMPLETED | OUTPATIENT
Start: 2025-07-10 | End: 2025-07-10

## 2025-07-10 RX ORDER — CLOPIDOGREL BISULFATE 300 MG/1
600 TABLET, FILM COATED ORAL
Status: DISCONTINUED | OUTPATIENT
Start: 2025-07-10 | End: 2025-07-10

## 2025-07-10 RX ORDER — AMIODARONE HYDROCHLORIDE 200 MG/1
200 TABLET ORAL DAILY
Status: DISCONTINUED | OUTPATIENT
Start: 2025-07-11 | End: 2025-07-10

## 2025-07-10 RX ORDER — IPRATROPIUM BROMIDE AND ALBUTEROL SULFATE 2.5; .5 MG/3ML; MG/3ML
3 SOLUTION RESPIRATORY (INHALATION)
Status: DISCONTINUED | OUTPATIENT
Start: 2025-07-10 | End: 2025-07-10

## 2025-07-10 RX ORDER — CLOPIDOGREL BISULFATE 75 MG/1
75 TABLET ORAL DAILY
Status: DISCONTINUED | OUTPATIENT
Start: 2025-07-11 | End: 2025-07-10 | Stop reason: HOSPADM

## 2025-07-10 RX ORDER — PREDNISONE 20 MG/1
20 TABLET ORAL
Status: DISCONTINUED | OUTPATIENT
Start: 2025-07-10 | End: 2025-07-10

## 2025-07-10 RX ORDER — LEVALBUTEROL INHALATION SOLUTION 1.25 MG/3ML
1.25 SOLUTION RESPIRATORY (INHALATION)
Status: COMPLETED | OUTPATIENT
Start: 2025-07-10 | End: 2025-07-10

## 2025-07-10 RX ORDER — MORPHINE SULFATE 4 MG/ML
4 INJECTION, SOLUTION INTRAMUSCULAR; INTRAVENOUS
Refills: 0 | Status: DISCONTINUED | OUTPATIENT
Start: 2025-07-10 | End: 2025-07-10

## 2025-07-10 RX ORDER — ASPIRIN 81 MG/1
81 TABLET ORAL DAILY
Status: DISCONTINUED | OUTPATIENT
Start: 2025-07-11 | End: 2025-07-10 | Stop reason: HOSPADM

## 2025-07-10 RX ORDER — FUROSEMIDE 10 MG/ML
80 INJECTION INTRAMUSCULAR; INTRAVENOUS
Status: DISCONTINUED | OUTPATIENT
Start: 2025-07-10 | End: 2025-07-10

## 2025-07-10 RX ORDER — IPRATROPIUM BROMIDE 0.5 MG/2.5ML
0.5 SOLUTION RESPIRATORY (INHALATION)
Status: COMPLETED | OUTPATIENT
Start: 2025-07-10 | End: 2025-07-10

## 2025-07-10 RX ORDER — AZITHROMYCIN 250 MG/1
500 TABLET, FILM COATED ORAL
Status: DISCONTINUED | OUTPATIENT
Start: 2025-07-10 | End: 2025-07-10

## 2025-07-10 RX ORDER — FUROSEMIDE 10 MG/ML
40 INJECTION INTRAMUSCULAR; INTRAVENOUS
Status: COMPLETED | OUTPATIENT
Start: 2025-07-10 | End: 2025-07-10

## 2025-07-10 RX ADMIN — LEVALBUTEROL HYDROCHLORIDE 1.25 MG: 1.25 SOLUTION RESPIRATORY (INHALATION) at 06:07

## 2025-07-10 RX ADMIN — FUROSEMIDE 40 MG: 10 INJECTION, SOLUTION INTRAVENOUS at 06:07

## 2025-07-10 RX ADMIN — IOHEXOL 100 ML: 350 INJECTION, SOLUTION INTRAVENOUS at 06:07

## 2025-07-10 RX ADMIN — IPRATROPIUM BROMIDE 0.5 MG: 0.5 SOLUTION RESPIRATORY (INHALATION) at 06:07

## 2025-07-10 RX ADMIN — METHYLPREDNISOLONE SODIUM SUCCINATE 125 MG: 125 INJECTION, POWDER, FOR SOLUTION INTRAMUSCULAR; INTRAVENOUS at 06:07

## 2025-07-10 RX ADMIN — ACETAMINOPHEN 1000 MG: 500 TABLET ORAL at 07:07

## 2025-07-10 NOTE — ED PROVIDER NOTES
"Encounter Date: 7/10/2025       History     Chief Complaint   Patient presents with    Chest Pain     Complains of chest pain and shortness of breath that started after lunch today     Chief Complaint  Sharp chest pain that "comes up" and worsens with breathing, shortness of breath, wheezing, fever, swelling "all up" noticed this morning, onset after dinner    History of Present Illness  Babak Scales, a patient with a history of congestive heart failure, COPD, and recent pneumonia, presents to the Emergency Department with chest pain and difficulty breathing that started after dinner today.    The patient reports experiencing very sharp chest pain that worsens when he takes a breath, describing it as "like I'm pulling it up when I pull my breath." The pain radiates to his back. He also notes difficulty breathing and wheezing, which he states is present all the time but appears to be worse today. The patient mentions having a "little fever" but did not measure his temperature. He denies vomiting, belly pain, and diarrhea.    Mr. Scales reports that his symptoms feel different from his previous experiences, particularly noting the pain as a distinguishing factor. He states that he noticed increased swelling this morning. The patient was hospitalized for 8 days about two weeks ago for human metanomavirus and strep pneumonia. He also mentions a history of heart failure and having a pacemaker/defibrillator.    The patient's family reports that he has excessive snoring and daytime somnolence, and there were plans for an outpatient sleep study. Mr. Scales admits to not regularly weighing himself, stating "I hate that" when asked about daily weight checks.    Medications and Supplements  - Steroids    - Used during previous hospital stay for treatment of human metanomavirus and strep pneumonia  - Antibiotics    - Used during previous hospital stay for treatment of human metanomavirus and strep pneumonia      Review of " patient's allergies indicates:   Allergen Reactions    Aspirin      GI distress with med, hx gastric ulcer    Metoprolol      Past Medical History:   Diagnosis Date    Arthritis     Asthma     Atrial fibrillation     xarelto    Bladder cancer     Bladder disorder     CAD (coronary artery disease)     CHF (congestive heart failure)     COPD (chronic obstructive pulmonary disease)     Diverticulitis     Enlarged prostate     GERD (gastroesophageal reflux disease)     Heart damage     History of kidney stones     HLD (hyperlipidemia)     [place] (hard of hearing)     Insomnia     Obesity     Skin cancer     SOB (shortness of breath)     Stroke     Thyroid disease      Past Surgical History:   Procedure Laterality Date    ABLATION N/A 4/14/2025    Procedure: Ablation;  Surgeon: Jono Rutherford MD;  Location: [facility] CATH LAB;  Service: Cardiology;  Laterality: N/A;    BLADDER SURGERY      CERVICAL FUSION      CHOLECYSTECTOMY      CLOSURE OF LEFT ATRIAL APPENDAGE USING DEVICE N/A 4/14/2025    Procedure: Left atrial appendage closure device;  Surgeon: Jono Rutherford MD;  Location: [facility] CATH LAB;  Service: Cardiology;  Laterality: N/A;  WATCHMAN W/ KIRBY + AF CATH ABLATION    COLONOSCOPY      ECHOCARDIOGRAM,TRANSESOPHAGEAL N/A 4/14/2025    Procedure: Transesophageal echo (KIRBY) intra-procedure log documentation;  Surgeon: Provider, Dosc Diagnostic;  Location: [facility] CATH LAB;  Service: Cardiology;  Laterality: N/A;    ESOPHAGOGASTRODUODENOSCOPY      EXCISION OF PTERYGIUM Left 06/25/2024    Procedure: EXCISION, PTERYGIUM WITH MMC AND CONJ AUTOGRAFT - OS;  Surgeon: Rohith Crespo MD;  Location: [facility] OR;  Service: Ophthalmology;  Laterality: Left;    EXCISIONAL HEMORRHOIDECTOMY      FUSION OF SACROILIAC JOINT      INSERTION OF PACEMAKER      2021    PHACOEMULSIFICATION, CATARACT, WITH IOL INSERTION Right 08/27/2024    Procedure: PHACOEMULSIFICATION, CATARACT, WITH IOL INSERTION- OD;  Surgeon: Rohith Crespo MD;  Location: [facility] OR;   Service: Ophthalmology;  Laterality: Right;    PHACOEMULSIFICATION, CATARACT, WITH IOL INSERTION Left 09/03/2024    Procedure: PHACOEMULSIFICATION, CATARACT, WITH IOL INSERTION- OS;  Surgeon: Rohith Crespo MD;  Location: [facility];  Service: Ophthalmology;  Laterality: Left;    REPLACEMENT OF IMPLANTABLE CARDIOVERTER-DEFIBRILLATOR (ICD) BATTERY  07/2022    skin cancer removal Right     skin underneath the eye     Family History   Problem Relation Name Age of Onset    No Known Problems Mother      Cancer Father       Social History[1]  Review of Systems   Constitutional:  Positive for fatigue. Negative for fever.   HENT: Negative.     Respiratory:  Positive for shortness of breath. Negative for wheezing.    Cardiovascular:  Positive for chest pain, palpitations and leg swelling.   Gastrointestinal:  Negative for abdominal pain and vomiting.   Endocrine: Negative.    Genitourinary: Negative.    Musculoskeletal: Negative.    Skin: Negative.    Neurological:  Positive for weakness. Negative for syncope and headaches.   Hematological: Negative.    Psychiatric/Behavioral: Negative.     All other systems reviewed and are negative.      Physical Exam     Initial Vitals [07/10/25 1739]   BP Pulse Resp Temp SpO2   (!) 140/103 (!) 125 (!) 24 99.5 °F (37.5 °C) (!) 93 %      MAP       --         Physical Exam    Nursing note and vitals reviewed.  Constitutional: He appears well-developed and well-nourished. No distress.   HENT:   Head: Normocephalic and atraumatic.   Eyes: EOM are normal. Pupils are equal, round, and reactive to light.   Neck: Trachea normal. Neck supple.    Full passive range of motion without pain.     Cardiovascular:  Regular rhythm and normal pulses.  Frequent extrasystoles are present.  Tachycardia present.       The patient has a device () in the left chest.   Pulmonary/Chest: He has wheezes. He has rales.   Musculoskeletal:      Cervical back: Full passive range of motion without pain and neck supple.       Right lower leg: Edema present.      Left lower leg: Edema present.      Comments: No deformity, Nl ROM     Lymphadenopathy:     He has no cervical adenopathy.   Neurological: He is alert and oriented to person, place, and time. He has normal strength. GCS eye subscore is 4. GCS verbal subscore is 5. GCS motor subscore is 6.   Skin: Skin is warm and intact. Capillary refill takes less than 2 seconds.   Psychiatric: He is not actively hallucinating. He expresses no homicidal and no suicidal ideation.         ED Course   Procedures  Labs Reviewed   COMPREHENSIVE METABOLIC PANEL - Abnormal       Result Value    Sodium 141      Potassium 3.7      Chloride 104      CO2 30      Glucose 107      Blood Urea Nitrogen 12.5      Creatinine 0.74      Calcium 9.0      Protein Total 7.8 (*)     Albumin 3.6      Globulin 4.2 (*)     Albumin/Globulin Ratio 0.9 (*)     Bilirubin Total 1.3      ALP 73      ALT 16      AST 17      eGFR >60      Anion Gap 7.0      BUN/Creatinine Ratio 17     B-TYPE NATRIURETIC PEPTIDE - Abnormal    Natriuretic Peptide 105.5 (*)    CBC WITH DIFFERENTIAL - Abnormal    WBC 10.74      RBC 4.27 (*)     Hgb 12.7 (*)     Hct 40.3 (*)     MCV 94.4 (*)     MCH 29.7      MCHC 31.5 (*)     RDW 14.6      Platelet 191      MPV 8.6      Neut % 75.3      Lymph % 14.0      Mono % 9.3      Eos % 0.7      Basophil % 0.5      Imm Grans % 0.2      Neut # 8.09      Lymph # 1.50      Mono # 1.00      Eos # 0.08      Baso # 0.05      Imm Gran # 0.02     BLOOD CULTURE OLG - Normal    Blood Culture No Growth At 72 Hours     BLOOD CULTURE OLG - Normal    Blood Culture No Growth At 72 Hours     TROPONIN I - Normal    Troponin-I 0.043     MAGNESIUM - Normal    Magnesium Level 1.80     LACTIC ACID, PLASMA - Normal    Lactic Acid Level 1.1     CBC W/ AUTO DIFFERENTIAL    Narrative:     The following orders were created for panel order CBC auto differential.  Procedure                               Abnormality         Status                      ---------                               -----------         ------                     CBC with Differential[[id number]]       Abnormal            Final result                 Please view results for these tests on the individual orders.     EKG Readings: (Independently Interpreted)    Sinus tachycardia with fusion complexes    - KS interval: 192 milliseconds    - QRS: 156 milliseconds    - QTc: 456 milliseconds    - Right bundle branch block    - Left anterior fascicular block  Rate 135     ECG Results              EKG 12-lead (Final result)        Collection Time Result Time QRS Duration OHS QTC Calculation    07/10/25 17:33:49 07/14/25 20:40:54 156 456                     Final result by Interface, Lab In [facility] (07/14/25 20:41:01)                   Narrative:    Test Reason : R07.9,    Vent. Rate : 135 BPM     Atrial Rate : 136 BPM     P-R Int : 192 ms          QRS Dur : 156 ms      QT Int : 304 ms       P-R-T Axes :    -83  93 degrees    QTcB Int : 456 ms    Sinus tachycardia with Fusion complexes  Right bundle branch block  Left anterior fascicular block   Bifascicular block   Abnormal ECG  Confirmed by Roney Mcgovern ([id number]) on 7/14/2025 8:40:53 PM    Referred By: AAAREFERRAL SELF           Confirmed By: Roney Mcgovern                                  Imaging Results              CTA Chest Non-Coronary (PE Studies) (Final result)  Result time 07/10/25 19:04:42      Final result by Jos Hamilton MD (07/10/25 19:04:42)                   Impression:      No pulmonary embolism seen    Reticulonodular interstitial infiltrate in the right lower lobe and right middle lobe appears slightly worse than the prior examination    Right lower lobe atelectasis and small effusion slightly worse than prior examination    Likely reactive mediastinal lymphadenopathy slightly more prominent than the prior examination      Electronically signed by: Abhijeet Hamilton  Date:    07/10/2025  Time:    19:04                Narrative:    EXAMINATION:  CTA CHEST NON CORONARY (PE STUDIES)    CLINICAL HISTORY:  Pulmonary embolism (PE) suspected, unknown D-dimer;    TECHNIQUE:  Low dose axial images, sagittal and coronal reformations were obtained from the thoracic inlet to the lung bases following the IV administration of contrast..  Contrast timing was optimized to evaluate the pulmonary arteries.  MIP images were performed.  Automated exposure control was utilized    COMPARISON:  06/04/2024 chest CT    FINDINGS:  There are chronic interstitial lung changes seen bilaterally and some changes consistent with COPD in the lungs.  There is right lower lobe atelectasis and a reticulonodular infiltrate in the right lower lobe and in the right middle lobe.  This has shown mild progression since the prior examination.  Thoracic aorta is normal in caliber.    The heart is enlarged in size.    There is some mediastinal lymphadenopathy seen.  The largest lymph node is seen in the precarinal region and has a short axis dimension of 1.1 cm.  These are slightly more prominent than the prior examination and may represent reactive lymphadenopathy.  There is also an enlarged lymph node in the subcarinal region measured on image 66 series 3 that measures 2.2 x 1.4 cm.  This is slightly more prominent than the prior examination.    No pulmonary embolism is seen.                                       X-Ray Chest AP Portable (Final result)  Result time 07/10/25 17:52:23      Final result by Raul Cordero MD (07/10/25 17:52:23)                   Impression:      No acute cardiopulmonary process identified.      Electronically signed by: Raul Cordero  Date:    07/10/2025  Time:    17:52               Narrative:    EXAMINATION:  XR CHEST AP PORTABLE    CLINICAL HISTORY:  Chest pain, unspecified    TECHNIQUE:  One view    COMPARISON:  Rachel 3, 2025.    FINDINGS:  Cardiopericardial silhouette enlarged appearance similar.  Left chest implanted cardiac device  electrodes terminate within the right atrium and the right ventricle.  There is some chronic dilatation of central pulmonary arteries without overt interstitial and alveolar pulmonary edema.  No focally dense consolidation or significant fluid within the pleural spaces.  No pneumothorax.                                    X-Rays:   Independently Interpreted Readings:   Chest X-Ray: No infiltrates.  No acute abnormalities. Cardiomegaly present. There is a pacemaker present in the left upper chest.     Medications   levalbuterol nebulizer solution 1.25 mg (1.25 mg Nebulization Given 7/10/25 1815)   levalbuterol nebulizer solution 1.25 mg (1.25 mg Nebulization Given 7/10/25 1857)   ipratropium 0.02 % nebulizer solution 0.5 mg (0.5 mg Nebulization Given 7/10/25 1858)   methylPREDNISolone sodium succinate injection 125 mg (125 mg Intravenous Given 7/10/25 1832)   furosemide injection 40 mg (40 mg Intravenous Given 7/10/25 1832)   iohexoL (OMNIPAQUE 350) injection 100 mL (100 mLs Intravenous Given 7/10/25 1848)   acetaminophen tablet 1,000 mg (1,000 mg Oral Given 7/10/25 1949)     Medical Decision Making  Medical Decision Making  Babak Scales is a male patient with a history of congestive heart failure, COPD, and recent human metanomavirus and strep pneumonia, presenting with acute onset chest pain and shortness of breath after dinner. The patient's presentation with sharp chest pain radiating to the back, associated dyspnea, and lower extremity edema raises concern for acute cardiac or pulmonary pathology. Given his history of heart failure and COPD, an exacerbation of either condition is possible. The presence of wheezing on examination suggests a COPD component. However, the sharp nature of the pain, its radiation to the back, and its association with breathing raise concern for other etiologies such as acute coronary syndrome, pulmonary embolism, or aortic dissection. The patient's multiple risk factors for heart  disease, including his pacemaker/defibrillator, further increase the suspicion for cardiac pathology. EKG shows sinus tachycardia with fusion complexes, right bundle branch block, and left anterior fascicular block. Given the complexity of the presentation and the patient's extensive cardiac history, cardiology consultation has been initiated. Imaging studies, including chest X-ray and potentially CT, are being considered to evaluate for pulmonary pathology and to assess the aorta. The differential diagnosis includes acute coronary syndrome, heart failure exacerbation, COPD exacerbation, pulmonary embolism, and aortic dissection.    Acute Chest Pain with Dyspnea  Assessment: 73-year-old male presenting with acute onset of sharp chest pain and dyspnea that began after dinner. Patient reports the pain radiates to his back and worsens with inspiration. He denies similar symptoms in the past, distinguishing this episode from his recent hospitalization for human metanomavirus and strep pneumonia. Patient has a history of congestive heart failure, COPD, and has a pacemaker/defibrillator in situ. Current examination reveals wheezing and tachycardia. Given the patient's risk factors and presentation, the differential diagnoses include acute coronary syndrome, pulmonary embolism, aortic dissection, and exacerbation of underlying cardiopulmonary conditions.  Plan:  - Administer respiratory treatment for wheezing  - Provide analgesia for chest discomfort  - Obtain chest imaging to evaluate for cardiopulmonary pathology and assess aorta  - Perform cardiac workup including routine cardiac labs  - Consult cardiology  - Admit patient for overnight observation and further evaluation    Congestive Heart Failure  Assessment: Patient has a known history of congestive heart failure. He reports increased lower extremity edema noticed this morning, suggesting possible fluid overload. Patient admits to not regularly weighing himself,  which is an important component of heart failure management.  Plan:  - Monitor fluid status and continue heart failure management  - Educate patient on importance of daily weight monitoring    Chronic Obstructive Pulmonary Disease (COPD)  Assessment: Patient has a history of COPD with chronic wheezing. Current examination reveals active wheezing, indicating possible exacerbation of underlying COPD.  Plan:  - Administer respiratory treatment for wheezing  - Continue current COPD management    Recent Pneumonia  Assessment: Patient reports recent 8-day hospitalization for human metanomavirus and strep pneumonia. Current symptoms are reported as different from the previous pneumonia episode.  Plan:  - Consider recent pneumonia in differential diagnosis of current presentation  - Review recent treatment course and response    Suspected Sleep Apnea  Assessment: Family reports patient has excessive snoring and daytime somnolence, suggestive of possible sleep apnea. An outpatient sleep study was previously planned.  Plan:  - Follow up on status of planned outpatient sleep study    Amount and/or Complexity of Data Reviewed  Labs: ordered.  Radiology: ordered.    Risk  OTC drugs.  Prescription drug management.               ED Course as of 07/15/25 0036   Thu Jul 10, 2025   6965 I reviewed the imaging studies ordered and made an interpretation of the studies prior to the official reading by the radiology service being available.  I tailored emergency care based on my preliminary findings.  I have now reviewed the official radiologist's reports and I agree with the asssessment of the radiologist. [DB]   1929 CT of the chest results     COMPARISON:  06/04/2024 chest CT     FINDINGS:  There are chronic interstitial lung changes seen bilaterally and some changes consistent with COPD in the lungs.  There is right lower lobe atelectasis and a reticulonodular infiltrate in the right lower lobe and in the right middle lobe.  This has  shown mild progression since the prior examination.  Thoracic aorta is normal in caliber.     The heart is enlarged in size.     There is some mediastinal lymphadenopathy seen.  The largest lymph node is seen in the precarinal region and has a short axis dimension of 1.1 cm.  These are slightly more prominent than the prior examination and may represent reactive lymphadenopathy.  There is also an enlarged lymph node in the subcarinal region measured on image 66 series 3 that measures 2.2 x 1.4 cm.  This is slightly more prominent than the prior examination.     No pulmonary embolism is seen.     Impression:     No pulmonary embolism seen     Reticulonodular interstitial infiltrate in the right lower lobe and right middle lobe appears slightly worse than the prior examination     Right lower lobe atelectasis and small effusion slightly worse than prior examination     Likely reactive mediastinal lymphadenopathy slightly more prominent than the prior examination   [DB]   1934 Medical Decision Making  The patient presents with ongoing chest discomfort, worsening with breathing. CT scan reveals worsening infiltrates in the right lower and right middle lung areas, suggesting a possible pulmonary infection. Given these findings, antibiotic therapy is being restarted. The differential diagnosis includes both cardiac and pulmonary etiologies. A cardiology consultation is pending to evaluate for potential cardiac causes of the chest discomfort. The worsening infiltrates on CT, combined with the patient's symptoms, support a diagnosis of pneumonia or other pulmonary infection, but cardiac involvement cannot be ruled out at this time. The decision to restart antibiotics is based on the CT findings and clinical presentation. Awaiting cardiology input to further refine the diagnosis and determine if admission is warranted.    Worsening Pulmonary Infiltrate  Assessment: CT scan reveals worsening infiltrate in the right lower and  right middle lung fields. This finding, along with the patient's reported chest discomfort worsening with breathing, suggests an active pulmonary process requiring intervention.  Plan:  - Restart antibiotic therapy    Chest Discomfort  Assessment: Patient reports ongoing chest discomfort, which is worst with breathing. The symptom's association with respiration may be related to the pulmonary infiltrate seen on CT. However, given the pending cardiology evaluation, a cardiac etiology cannot be ruled out at this time.  Plan:  - Await cardiology consultation  - Anticipate admission to higher level of care pending cardiology evaluation.  - Update patient at bedside in several minutes   [DB]      ED Course User Index  [DB] Ramakrishna Sprague MD                           Clinical Impression:  Final diagnoses:  [R07.9] Chest pain (Primary)  [I48.11] Longstanding persistent atrial fibrillation  [J15.3] Pneumonia of right lung due to group B Streptococcus, unspecified part of lung  [R06.00, R06.89] Dyspnea and respiratory abnormalities  [R07.1] Painful breathing          ED Disposition Condition    Transfer to Another Facility Stable                      [1]   Social History  Tobacco Use    Smoking status: Former     Current packs/day: 1.00     Average packs/day: 1 pack/day for 20.0 years (20.0 ttl pk-yrs)     Types: Cigarettes    Smokeless tobacco: Never    Tobacco comments:     Quit 30 years ago   Substance Use Topics    Alcohol use: Yes     Alcohol/week: 1.0 standard drink of alcohol     Types: 1 Cans of beer per week     Comment: 1 beer a week    Drug use: Never        Ramakrishna Sprague MD  07/15/25 0037

## 2025-07-11 ENCOUNTER — HOSPITAL ENCOUNTER (INPATIENT)
Facility: HOSPITAL | Age: 80
LOS: 3 days | Discharge: HOME | DRG: 193 | End: 2025-07-14
Attending: EMERGENCY MEDICINE
Payer: MEDICARE

## 2025-07-11 DIAGNOSIS — I50.9 CONGESTIVE HEART FAILURE, UNSPECIFIED HF CHRONICITY, UNSPECIFIED HEART FAILURE TYPE: ICD-10-CM

## 2025-07-11 DIAGNOSIS — J18.9 RLL PNEUMONIA: Primary | ICD-10-CM

## 2025-07-11 DIAGNOSIS — R06.01 ORTHOPNEA: ICD-10-CM

## 2025-07-11 DIAGNOSIS — R07.9 CHEST PAIN: ICD-10-CM

## 2025-07-11 DIAGNOSIS — J44.9 CHRONIC OBSTRUCTIVE PULMONARY DISEASE, UNSPECIFIED COPD TYPE: ICD-10-CM

## 2025-07-11 LAB
ALBUMIN SERPL-MCNC: 3.5 G/DL (ref 3.4–4.8)
ALBUMIN/GLOB SERPL: 0.9 RATIO (ref 1.1–2)
ALP SERPL-CCNC: 71 UNIT/L (ref 40–150)
ALT SERPL-CCNC: 14 UNIT/L (ref 0–55)
ANION GAP SERPL CALC-SCNC: 12 MEQ/L
AST SERPL-CCNC: 17 UNIT/L (ref 11–45)
BASOPHILS # BLD AUTO: 0.01 X10(3)/MCL
BASOPHILS NFR BLD AUTO: 0.1 %
BILIRUB SERPL-MCNC: 1.5 MG/DL
BUN SERPL-MCNC: 13.1 MG/DL (ref 8.4–25.7)
CALCIUM SERPL-MCNC: 8.6 MG/DL (ref 8.8–10)
CHLORIDE SERPL-SCNC: 103 MMOL/L (ref 98–107)
CO2 SERPL-SCNC: 25 MMOL/L (ref 23–31)
CREAT SERPL-MCNC: 0.69 MG/DL (ref 0.72–1.25)
CREAT/UREA NIT SERPL: 19
EOSINOPHIL # BLD AUTO: 0 X10(3)/MCL (ref 0–0.9)
EOSINOPHIL NFR BLD AUTO: 0 %
ERYTHROCYTE [DISTWIDTH] IN BLOOD BY AUTOMATED COUNT: 14.9 % (ref 11.5–17)
GFR SERPLBLD CREATININE-BSD FMLA CKD-EPI: >60 ML/MIN/1.73/M2
GLOBULIN SER-MCNC: 3.8 GM/DL (ref 2.4–3.5)
GLUCOSE SERPL-MCNC: 178 MG/DL (ref 82–115)
HCT VFR BLD AUTO: 37.9 % (ref 42–52)
HGB BLD-MCNC: 12.2 G/DL (ref 14–18)
IMM GRANULOCYTES # BLD AUTO: 0.04 X10(3)/MCL (ref 0–0.04)
IMM GRANULOCYTES NFR BLD AUTO: 0.5 %
LYMPHOCYTES # BLD AUTO: 0.59 X10(3)/MCL (ref 0.6–4.6)
LYMPHOCYTES NFR BLD AUTO: 7.1 %
MAGNESIUM SERPL-MCNC: 1.8 MG/DL (ref 1.6–2.6)
MCH RBC QN AUTO: 29.4 PG (ref 27–31)
MCHC RBC AUTO-ENTMCNC: 32.2 G/DL (ref 33–36)
MCV RBC AUTO: 91.3 FL (ref 80–94)
MONOCYTES # BLD AUTO: 0.11 X10(3)/MCL (ref 0.1–1.3)
MONOCYTES NFR BLD AUTO: 1.3 %
MRSA PCR SCRN (OHS): NOT DETECTED
NEUTROPHILS # BLD AUTO: 7.59 X10(3)/MCL (ref 2.1–9.2)
NEUTROPHILS NFR BLD AUTO: 91 %
NRBC BLD AUTO-RTO: 0 %
PLATELET # BLD AUTO: 186 X10(3)/MCL (ref 130–400)
PMV BLD AUTO: 8.9 FL (ref 7.4–10.4)
POTASSIUM SERPL-SCNC: 4.1 MMOL/L (ref 3.5–5.1)
PROT SERPL-MCNC: 7.3 GM/DL (ref 5.8–7.6)
RBC # BLD AUTO: 4.15 X10(6)/MCL (ref 4.7–6.1)
SODIUM SERPL-SCNC: 140 MMOL/L (ref 136–145)
TROPONIN I SERPL-MCNC: 0.03 NG/ML (ref 0–0.04)
WBC # BLD AUTO: 8.34 X10(3)/MCL (ref 4.5–11.5)

## 2025-07-11 PROCEDURE — 21400001 HC TELEMETRY ROOM

## 2025-07-11 PROCEDURE — 25000003 PHARM REV CODE 250: Performed by: NURSE PRACTITIONER

## 2025-07-11 PROCEDURE — 80053 COMPREHEN METABOLIC PANEL: CPT | Performed by: NURSE PRACTITIONER

## 2025-07-11 PROCEDURE — 84484 ASSAY OF TROPONIN QUANT: CPT | Performed by: EMERGENCY MEDICINE

## 2025-07-11 PROCEDURE — 94640 AIRWAY INHALATION TREATMENT: CPT

## 2025-07-11 PROCEDURE — 94760 N-INVAS EAR/PLS OXIMETRY 1: CPT

## 2025-07-11 PROCEDURE — A4216 STERILE WATER/SALINE, 10 ML: HCPCS

## 2025-07-11 PROCEDURE — 99900031 HC PATIENT EDUCATION (STAT)

## 2025-07-11 PROCEDURE — 25000003 PHARM REV CODE 250

## 2025-07-11 PROCEDURE — 99900035 HC TECH TIME PER 15 MIN (STAT)

## 2025-07-11 PROCEDURE — 27000221 HC OXYGEN, UP TO 24 HOURS

## 2025-07-11 PROCEDURE — 27000646 HC AEROBIKA DEVICE

## 2025-07-11 PROCEDURE — 94761 N-INVAS EAR/PLS OXIMETRY MLT: CPT

## 2025-07-11 PROCEDURE — 87070 CULTURE OTHR SPECIMN AEROBIC: CPT

## 2025-07-11 PROCEDURE — 25000242 PHARM REV CODE 250 ALT 637 W/ HCPCS

## 2025-07-11 PROCEDURE — 85025 COMPLETE CBC W/AUTO DIFF WBC: CPT | Performed by: NURSE PRACTITIONER

## 2025-07-11 PROCEDURE — 87641 MR-STAPH DNA AMP PROBE: CPT

## 2025-07-11 PROCEDURE — 63600175 PHARM REV CODE 636 W HCPCS

## 2025-07-11 PROCEDURE — 83735 ASSAY OF MAGNESIUM: CPT | Performed by: NURSE PRACTITIONER

## 2025-07-11 PROCEDURE — 63600175 PHARM REV CODE 636 W HCPCS: Performed by: NURSE PRACTITIONER

## 2025-07-11 PROCEDURE — 11000001 HC ACUTE MED/SURG PRIVATE ROOM

## 2025-07-11 PROCEDURE — 94664 DEMO&/EVAL PT USE INHALER: CPT

## 2025-07-11 RX ORDER — SODIUM CHLORIDE 0.9 % (FLUSH) 0.9 %
10 SYRINGE (ML) INJECTION
Status: DISCONTINUED | OUTPATIENT
Start: 2025-07-11 | End: 2025-07-14 | Stop reason: HOSPADM

## 2025-07-11 RX ORDER — CHOLECALCIFEROL (VITAMIN D3) 25 MCG
1000 TABLET ORAL DAILY
COMMUNITY

## 2025-07-11 RX ORDER — LEVOTHYROXINE SODIUM 50 UG/1
50 TABLET ORAL
Status: DISCONTINUED | OUTPATIENT
Start: 2025-07-11 | End: 2025-07-14 | Stop reason: HOSPADM

## 2025-07-11 RX ORDER — METHIMAZOLE 10 MG/1
10 TABLET ORAL DAILY
Status: ON HOLD | COMMUNITY
End: 2025-07-14 | Stop reason: HOSPADM

## 2025-07-11 RX ORDER — AMLODIPINE BESYLATE 5 MG/1
5 TABLET ORAL DAILY
Status: DISCONTINUED | OUTPATIENT
Start: 2025-07-11 | End: 2025-07-14 | Stop reason: HOSPADM

## 2025-07-11 RX ORDER — IBUPROFEN 200 MG
24 TABLET ORAL
Status: DISCONTINUED | OUTPATIENT
Start: 2025-07-11 | End: 2025-07-14 | Stop reason: HOSPADM

## 2025-07-11 RX ORDER — NALOXONE HCL 0.4 MG/ML
0.02 VIAL (ML) INJECTION
Status: DISCONTINUED | OUTPATIENT
Start: 2025-07-11 | End: 2025-07-14 | Stop reason: HOSPADM

## 2025-07-11 RX ORDER — ONDANSETRON HYDROCHLORIDE 2 MG/ML
4 INJECTION, SOLUTION INTRAVENOUS EVERY 4 HOURS PRN
Status: DISCONTINUED | OUTPATIENT
Start: 2025-07-11 | End: 2025-07-14 | Stop reason: HOSPADM

## 2025-07-11 RX ORDER — DAPAGLIFLOZIN 10 MG/1
10 TABLET, FILM COATED ORAL DAILY
Status: DISCONTINUED | OUTPATIENT
Start: 2025-07-11 | End: 2025-07-14 | Stop reason: HOSPADM

## 2025-07-11 RX ORDER — ATORVASTATIN CALCIUM 40 MG/1
40 TABLET, FILM COATED ORAL NIGHTLY
Status: DISCONTINUED | OUTPATIENT
Start: 2025-07-11 | End: 2025-07-14 | Stop reason: HOSPADM

## 2025-07-11 RX ORDER — FAMOTIDINE 20 MG/1
20 TABLET, FILM COATED ORAL 2 TIMES DAILY
COMMUNITY

## 2025-07-11 RX ORDER — PRAVASTATIN SODIUM 20 MG/1
20 TABLET ORAL DAILY
Status: ON HOLD | COMMUNITY
End: 2025-07-14 | Stop reason: HOSPADM

## 2025-07-11 RX ORDER — ACETAMINOPHEN 325 MG/1
650 TABLET ORAL EVERY 6 HOURS PRN
Status: DISCONTINUED | OUTPATIENT
Start: 2025-07-11 | End: 2025-07-14 | Stop reason: HOSPADM

## 2025-07-11 RX ORDER — FUROSEMIDE 10 MG/ML
40 INJECTION INTRAMUSCULAR; INTRAVENOUS EVERY 12 HOURS
Status: DISCONTINUED | OUTPATIENT
Start: 2025-07-11 | End: 2025-07-13

## 2025-07-11 RX ORDER — ACETAMINOPHEN 500 MG
1000 TABLET ORAL EVERY 6 HOURS PRN
Status: DISCONTINUED | OUTPATIENT
Start: 2025-07-11 | End: 2025-07-11

## 2025-07-11 RX ORDER — AMLODIPINE BESYLATE 10 MG/1
5 TABLET ORAL DAILY
COMMUNITY

## 2025-07-11 RX ORDER — UMECLIDINIUM BROMIDE AND VILANTEROL TRIFENATATE 62.5; 25 UG/1; UG/1
1 POWDER RESPIRATORY (INHALATION) DAILY
Status: DISCONTINUED | OUTPATIENT
Start: 2025-07-11 | End: 2025-07-14 | Stop reason: HOSPADM

## 2025-07-11 RX ORDER — ASPIRIN 81 MG/1
81 TABLET ORAL DAILY
Status: DISCONTINUED | OUTPATIENT
Start: 2025-07-11 | End: 2025-07-14 | Stop reason: HOSPADM

## 2025-07-11 RX ORDER — GLUCAGON 1 MG
1 KIT INJECTION
Status: DISCONTINUED | OUTPATIENT
Start: 2025-07-11 | End: 2025-07-14 | Stop reason: HOSPADM

## 2025-07-11 RX ORDER — CEFTRIAXONE 1 G/1
1 INJECTION, POWDER, FOR SOLUTION INTRAMUSCULAR; INTRAVENOUS
Status: DISCONTINUED | OUTPATIENT
Start: 2025-07-11 | End: 2025-07-14 | Stop reason: HOSPADM

## 2025-07-11 RX ORDER — AMIODARONE HYDROCHLORIDE 200 MG/1
200 TABLET ORAL DAILY
Status: DISCONTINUED | OUTPATIENT
Start: 2025-07-11 | End: 2025-07-12

## 2025-07-11 RX ORDER — FINASTERIDE 5 MG/1
5 TABLET, FILM COATED ORAL DAILY
COMMUNITY

## 2025-07-11 RX ORDER — WATER FOR INJECTION,STERILE
VIAL (ML) INJECTION
Status: COMPLETED
Start: 2025-07-11 | End: 2025-07-11

## 2025-07-11 RX ORDER — IPRATROPIUM BROMIDE AND ALBUTEROL SULFATE 2.5; .5 MG/3ML; MG/3ML
3 SOLUTION RESPIRATORY (INHALATION) EVERY 6 HOURS
Status: DISCONTINUED | OUTPATIENT
Start: 2025-07-11 | End: 2025-07-14 | Stop reason: HOSPADM

## 2025-07-11 RX ORDER — PROPRANOLOL HYDROCHLORIDE 80 MG/1
80 TABLET ORAL DAILY
Status: ON HOLD | COMMUNITY
End: 2025-07-14 | Stop reason: HOSPADM

## 2025-07-11 RX ORDER — IBUPROFEN 200 MG
16 TABLET ORAL
Status: DISCONTINUED | OUTPATIENT
Start: 2025-07-11 | End: 2025-07-14 | Stop reason: HOSPADM

## 2025-07-11 RX ORDER — PANTOPRAZOLE SODIUM 40 MG/1
40 TABLET, DELAYED RELEASE ORAL DAILY
Status: DISCONTINUED | OUTPATIENT
Start: 2025-07-11 | End: 2025-07-14 | Stop reason: HOSPADM

## 2025-07-11 RX ORDER — LEVOTHYROXINE SODIUM 75 UG/1
75 TABLET ORAL
COMMUNITY

## 2025-07-11 RX ORDER — AMOXICILLIN 250 MG
1 CAPSULE ORAL 2 TIMES DAILY PRN
Status: DISCONTINUED | OUTPATIENT
Start: 2025-07-11 | End: 2025-07-14 | Stop reason: HOSPADM

## 2025-07-11 RX ORDER — BISACODYL 10 MG/1
10 SUPPOSITORY RECTAL DAILY PRN
Status: DISCONTINUED | OUTPATIENT
Start: 2025-07-11 | End: 2025-07-14 | Stop reason: HOSPADM

## 2025-07-11 RX ORDER — ENOXAPARIN SODIUM 100 MG/ML
40 INJECTION SUBCUTANEOUS EVERY 24 HOURS
Status: DISCONTINUED | OUTPATIENT
Start: 2025-07-11 | End: 2025-07-14 | Stop reason: HOSPADM

## 2025-07-11 RX ORDER — CLOPIDOGREL BISULFATE 75 MG/1
75 TABLET ORAL DAILY
Status: DISCONTINUED | OUTPATIENT
Start: 2025-07-11 | End: 2025-07-14 | Stop reason: HOSPADM

## 2025-07-11 RX ORDER — IPRATROPIUM BROMIDE AND ALBUTEROL SULFATE 2.5; .5 MG/3ML; MG/3ML
3 SOLUTION RESPIRATORY (INHALATION) EVERY 4 HOURS PRN
Status: DISCONTINUED | OUTPATIENT
Start: 2025-07-11 | End: 2025-07-14 | Stop reason: HOSPADM

## 2025-07-11 RX ORDER — PROCHLORPERAZINE EDISYLATE 5 MG/ML
5 INJECTION INTRAMUSCULAR; INTRAVENOUS EVERY 6 HOURS PRN
Status: DISCONTINUED | OUTPATIENT
Start: 2025-07-11 | End: 2025-07-14 | Stop reason: HOSPADM

## 2025-07-11 RX ORDER — TALC
6 POWDER (GRAM) TOPICAL NIGHTLY PRN
Status: DISCONTINUED | OUTPATIENT
Start: 2025-07-11 | End: 2025-07-14 | Stop reason: HOSPADM

## 2025-07-11 RX ORDER — ALUMINUM HYDROXIDE, MAGNESIUM HYDROXIDE, AND SIMETHICONE 1200; 120; 1200 MG/30ML; MG/30ML; MG/30ML
30 SUSPENSION ORAL 4 TIMES DAILY PRN
Status: DISCONTINUED | OUTPATIENT
Start: 2025-07-11 | End: 2025-07-14 | Stop reason: HOSPADM

## 2025-07-11 RX ADMIN — METHYLPREDNISOLONE SODIUM SUCCINATE 40 MG: 40 INJECTION, POWDER, FOR SOLUTION INTRAMUSCULAR; INTRAVENOUS at 05:07

## 2025-07-11 RX ADMIN — FLUTICASONE FUROATE 1 PUFF: 100 POWDER RESPIRATORY (INHALATION) at 11:07

## 2025-07-11 RX ADMIN — LEVOTHYROXINE SODIUM 50 MCG: 0.05 TABLET ORAL at 08:07

## 2025-07-11 RX ADMIN — IPRATROPIUM BROMIDE AND ALBUTEROL SULFATE 3 ML: .5; 3 SOLUTION RESPIRATORY (INHALATION) at 01:07

## 2025-07-11 RX ADMIN — METHYLPREDNISOLONE SODIUM SUCCINATE 40 MG: 40 INJECTION, POWDER, FOR SOLUTION INTRAMUSCULAR; INTRAVENOUS at 09:07

## 2025-07-11 RX ADMIN — PANTOPRAZOLE SODIUM 40 MG: 40 TABLET, DELAYED RELEASE ORAL at 09:07

## 2025-07-11 RX ADMIN — IPRATROPIUM BROMIDE AND ALBUTEROL SULFATE 3 ML: .5; 3 SOLUTION RESPIRATORY (INHALATION) at 07:07

## 2025-07-11 RX ADMIN — AMLODIPINE BESYLATE 5 MG: 5 TABLET ORAL at 09:07

## 2025-07-11 RX ADMIN — ATORVASTATIN CALCIUM 40 MG: 40 TABLET, FILM COATED ORAL at 09:07

## 2025-07-11 RX ADMIN — ASPIRIN 81 MG: 81 TABLET, DELAYED RELEASE ORAL at 09:07

## 2025-07-11 RX ADMIN — CEFTRIAXONE SODIUM 1 G: 1 INJECTION, POWDER, FOR SOLUTION INTRAMUSCULAR; INTRAVENOUS at 03:07

## 2025-07-11 RX ADMIN — WATER 10 ML: 1 INJECTION INTRAMUSCULAR; INTRAVENOUS; SUBCUTANEOUS at 03:07

## 2025-07-11 RX ADMIN — DAPAGLIFLOZIN 10 MG: 10 TABLET, FILM COATED ORAL at 05:07

## 2025-07-11 RX ADMIN — CLOPIDOGREL 75 MG: 75 TABLET ORAL at 09:07

## 2025-07-11 RX ADMIN — ALUMINUM HYDROXIDE, MAGNESIUM HYDROXIDE, AND DIMETHICONE 30 ML: 200; 20; 200 SUSPENSION ORAL at 05:07

## 2025-07-11 RX ADMIN — AZITHROMYCIN DIHYDRATE 500 MG: 500 INJECTION, POWDER, LYOPHILIZED, FOR SOLUTION INTRAVENOUS at 03:07

## 2025-07-11 RX ADMIN — UMECLIDINIUM BROMIDE AND VILANTEROL TRIFENATATE 1 PUFF: 62.5; 25 POWDER RESPIRATORY (INHALATION) at 11:07

## 2025-07-11 RX ADMIN — ENOXAPARIN SODIUM 40 MG: 40 INJECTION SUBCUTANEOUS at 05:07

## 2025-07-11 RX ADMIN — AMIODARONE HYDROCHLORIDE 200 MG: 200 TABLET ORAL at 05:07

## 2025-07-11 RX ADMIN — FUROSEMIDE 40 MG: 10 INJECTION, SOLUTION INTRAVENOUS at 10:07

## 2025-07-11 NOTE — ED PROVIDER NOTES
Encounter Date: 7/10/2025       History     Chief Complaint   Patient presents with    Transfer     Transfer from [facility] for Cardiology, trop 0.04 Cta chest showed atelectasis with inc effusion from previous scan.      80 y/o M with hx CAD, CHF, AF, COPD presents as transfer from [facility] for cardiology services after presenting with pleuritic chest pain which started today, but cough and wheezing for the last few days. Dx w/ pneumonia and started on abx. CTA w/o PE, noted increased opacities and small pleural effusion. EKG w/o acute ischemic changes, initial cardiac enzymes negative. Telecardiology consultant recommended transfer to a facility where interventional cardiology would be available if needed.   Reports still w/ central chest pain, worse with coughing and deep breathing upon arrival.     The history is provided by the patient and medical records.     Review of patient's allergies indicates:   Allergen Reactions    Aspirin      GI distress with med, hx gastric ulcer    Metoprolol      Past Medical History:   Diagnosis Date    Arthritis     Asthma     Atrial fibrillation     xarelto    Bladder cancer     Bladder disorder     CAD (coronary artery disease)     CHF (congestive heart failure)     COPD (chronic obstructive pulmonary disease)     Diverticulitis     Enlarged prostate     GERD (gastroesophageal reflux disease)     Heart damage     History of kidney stones     HLD (hyperlipidemia)     [place] (hard of hearing)     Insomnia     Obesity     Skin cancer     SOB (shortness of breath)     Stroke     Thyroid disease      Past Surgical History:   Procedure Laterality Date    ABLATION N/A 4/14/2025    Procedure: Ablation;  Surgeon: Jono Rutherford MD;  Location: [facility] CATH LAB;  Service: Cardiology;  Laterality: N/A;    BLADDER SURGERY      CERVICAL FUSION      CHOLECYSTECTOMY      CLOSURE OF LEFT ATRIAL APPENDAGE USING DEVICE N/A 4/14/2025    Procedure: Left atrial appendage closure device;  Surgeon:  Jono Rutherford MD;  Location: [facility] CATH LAB;  Service: Cardiology;  Laterality: N/A;  WATCHMAN W/ KIRBY + AF CATH ABLATION    COLONOSCOPY      ECHOCARDIOGRAM,TRANSESOPHAGEAL N/A 4/14/2025    Procedure: Transesophageal echo (KIRBY) intra-procedure log documentation;  Surgeon: Provider, Dosc Diagnostic;  Location: [facility] CATH LAB;  Service: Cardiology;  Laterality: N/A;    ESOPHAGOGASTRODUODENOSCOPY      EXCISION OF PTERYGIUM Left 06/25/2024    Procedure: EXCISION, PTERYGIUM WITH MMC AND CONJ AUTOGRAFT - OS;  Surgeon: Rohith Crespo MD;  Location: [facility] OR;  Service: Ophthalmology;  Laterality: Left;    EXCISIONAL HEMORRHOIDECTOMY      FUSION OF SACROILIAC JOINT      INSERTION OF PACEMAKER      2021    PHACOEMULSIFICATION, CATARACT, WITH IOL INSERTION Right 08/27/2024    Procedure: PHACOEMULSIFICATION, CATARACT, WITH IOL INSERTION- OD;  Surgeon: Rohith Crespo MD;  Location: [facility] OR;  Service: Ophthalmology;  Laterality: Right;    PHACOEMULSIFICATION, CATARACT, WITH IOL INSERTION Left 09/03/2024    Procedure: PHACOEMULSIFICATION, CATARACT, WITH IOL INSERTION- OS;  Surgeon: Rohith Crespo MD;  Location: [facility] OR;  Service: Ophthalmology;  Laterality: Left;    REPLACEMENT OF IMPLANTABLE CARDIOVERTER-DEFIBRILLATOR (ICD) BATTERY  07/2022    skin cancer removal Right     skin underneath the eye     Family History   Problem Relation Name Age of Onset    No Known Problems Mother      Cancer Father       Social History[1]  Review of Systems   Constitutional:  Positive for fatigue. Negative for fever.   Respiratory:  Positive for cough and shortness of breath.    Cardiovascular:  Positive for chest pain.   Gastrointestinal:  Negative for vomiting.       Physical Exam     Initial Vitals [07/10/25 2353]   BP Pulse Resp Temp SpO2   138/82 102 20 98.1 °F (36.7 °C) 95 %      MAP       --         Physical Exam    Nursing note and vitals reviewed.  Constitutional: He appears well-developed and well-nourished. No distress.   HENT:   Head:  Normocephalic and atraumatic. Mouth/Throat: Oropharynx is clear and moist.   Eyes: No scleral icterus.   Neck: Neck supple.   Normal range of motion.  Cardiovascular:  Normal rate. An irregularly irregular rhythm present.           Pulmonary/Chest: Breath sounds normal. No respiratory distress.   Abdominal: Abdomen is soft. He exhibits no distension. There is no abdominal tenderness.   Musculoskeletal:         General: Edema present.      Cervical back: Normal range of motion and neck supple.     Neurological: He is alert and oriented to person, place, and time. GCS score is 15.   Skin: Skin is warm and dry.         ED Course   Procedures  Labs Reviewed   COMPREHENSIVE METABOLIC PANEL - Abnormal       Result Value    Sodium 140      Potassium 4.1      Chloride 103      CO2 25      Glucose 178 (*)     Blood Urea Nitrogen 13.1      Creatinine 0.69 (*)     Calcium 8.6 (*)     Protein Total 7.3      Albumin 3.5      Globulin 3.8 (*)     Albumin/Globulin Ratio 0.9 (*)     Bilirubin Total 1.5      ALP 71      ALT 14      AST 17      eGFR >60      Anion Gap 12.0      BUN/Creatinine Ratio 19     CBC WITH DIFFERENTIAL - Abnormal    WBC 8.34      RBC 4.15 (*)     Hgb 12.2 (*)     Hct 37.9 (*)     MCV 91.3      MCH 29.4      MCHC 32.2 (*)     RDW 14.9      Platelet 186      MPV 8.9      Neut % 91.0      Lymph % 7.1      Mono % 1.3      Eos % 0.0      Basophil % 0.1      Imm Grans % 0.5      Neut # 7.59      Lymph # 0.59 (*)     Mono # 0.11      Eos # 0.00      Baso # 0.01      Imm Gran # 0.04      NRBC% 0.0     TROPONIN I - Normal    Troponin-I 0.029     MAGNESIUM - Normal    Magnesium Level 1.80     CBC W/ AUTO DIFFERENTIAL    Narrative:     The following orders were created for panel order CBC Auto Differential.  Procedure                               Abnormality         Status                     ---------                               -----------         ------                     CBC with Differential[[id number]]        Abnormal            Final result                 Please view results for these tests on the individual orders.          Imaging Results    None            Medical Decision Making  Problems Addressed:  Chronic obstructive pulmonary disease, unspecified COPD type: chronic illness or injury with exacerbation, progression, or side effects of treatment  Congestive heart failure, unspecified HF chronicity, unspecified heart failure type: chronic illness or injury  Orthopnea: acute illness or injury  RLL pneumonia: acute illness or injury    Risk  Decision regarding hospitalization.      ED assessment:    Mr. Scales presented as transfer from [facility] for cardiology services. Admitted in June 2025 w/ COPD exacerbation, human metapneumovirus pneumonia. Now w/ increased orthopnea, worsening infiltrates on chest imaging.     Differential diagnosis (including but not limited to):   Post viral pneumonia, community acquired pneumonia, CHF exacerbation, ACS felt less likely, PE ruled out at prior facility    ED management:   Repeat troponin on arrival here remains negative/is downtrending. IV diuresis initiated at prior facility. Flutter valve treatments and nebs requested from respiratory therapy to assist with COPD Discussed with hospitalist, will start on CAP coverage and admit    Amount and/or Complexity of Data Reviewed  Independent historian: EMS   Summary of history: Transfer from [facility] for cardilogy w/ CP complaint, neg troponin at prior facility. On nasal cannula. + AF  External data reviewed: notes from previous admissions, transfer records, prior labs, prior EKGs, and prior imaging  Summary of data reviewed:   In AF w/ mild RVR on OSH EKG  CT chest w/ no PE, worsening RLL and RML reticulonodular interstitial infiltrate and associated atelectasis and small effusion.    Labs w/ no leukocytosis, neg troponin, mildly elevated BNP  Given lasix prior to transfer    Admission 6/7/25 for COPD exacerbation, dx w/ human  metapneumovirus pneumonia, managed w/ nebs, steroids      Risk and benefits of testing: discussed   Labs: ordered and reviewed      Discussion of management or test interpretation with external provider(s): discussed with hospitalist physician   Summary of discussion:  as above    Risk  Prescription drug management   Decision regarding hospitalization  Shared decision making     Critical Care  none    I, Cailin Angeles MD personally performed the history, PE, MDM, and procedures as documented above and agree with the scribe's documentation.                                     Clinical Impression:  Final diagnoses:  [J18.9] RLL pneumonia (Primary)  [J44.9] Chronic obstructive pulmonary disease, unspecified COPD type  [R06.01] Orthopnea  [I50.9] Congestive heart failure, unspecified HF chronicity, unspecified heart failure type          ED Disposition Condition    Admit                       [1]   Social History  Tobacco Use    Smoking status: Former     Current packs/day: 1.00     Average packs/day: 1 pack/day for 20.0 years (20.0 ttl pk-yrs)     Types: Cigarettes    Smokeless tobacco: Never    Tobacco comments:     Quit 30 years ago   Substance Use Topics    Alcohol use: Yes     Alcohol/week: 1.0 standard drink of alcohol     Types: 1 Cans of beer per week     Comment: 1 beer a week    Drug use: Never        Cailin Angeles MD  07/12/25 1602

## 2025-07-11 NOTE — ED NOTES
Pt diuresing well  - using urinal.  Jacquie segura np w [facility] is on the telecardiology  consult  now pt.

## 2025-07-11 NOTE — CONSULTS
Consults  [facility]    Cardiology  Consult Note    Patient Name: Babak Scales  MRN: [id number]  Admission Date: 7/11/2025  Hospital Length of Stay: 0 days  Code Status: Full Code   Attending Provider: Yehuda Chery,*   Consulting Provider: TIFFANIE Kaur  Primary Care Physician: Guillermo Brown Jr., MD  Principal Problem:<principal problem not specified>    Patient information was obtained from patient and ER records.     Subjective:     Chief Complaint/Reason for Consult: CHF    HPI:   79-year-old male with a history of nonischemic cardiomyopathy, biventricular AICD which is a [employer] device, atrial fibrillation status post watchman, positive calcium score and bladder cancer.  Last coronary angiogram was November 2017 showing no significant coronary disease except a 70% fourth diagonal stenosis.  PET scan December 2023 shows no evidence of ischemia.  Echocardiogram performed June 2025 shows a EF of 35-40.  He presents back to the hospital with ongoing shortness of breath and wheezing.  He states over the past several days its gotten worse he tries to lay flat in his bed cannot do it has to sit up on the side of the bed to breathe.  EKG done upon arrival shows atrial fibrillation with a moderate to rapid ventricular response.  There is no ischemic changes.  Chest x-ray shows some infiltrates.  CTA shows infiltrates that have been worsening.  Lab work shows negative troponin and a minimally evaded BNP.  He was given 40 mg of Lasix and is diuresing quite well. [facility] is being consulted for CHF.         PMH: Chronic systolic/diastolic HF, AF, BiV AICD, elevated coronary calcium score, bladder ca, GERD, HLD  PSH: A fib ablation, TIARA closure, cataract surgery  Family History: Father-Cancer, Mother-HTN, Brother-CAD  Social History: Ex smoker, Denied alcohol and drug use      Previous Cardiac Diagnostics:   Echocardiogram 6.4.25  Left Ventricle: The left ventricle is  mildly dilated. Normal wall thickness. Moderate global hypokinesis present. There is moderately reduced systolic function with a visually estimated ejection fraction of 35 - 40%.  Right Ventricle: The right ventricle is normal in size Systolic function is normal.  Mitral Valve: There is mild regurgitation.  Tricuspid Valve: There is mild regurgitation.  Limited view study.     CT of the chest 6.4.25  Scattered nodular opacities favored infection or inflammatory. Given the lower lobe nodule measures 9 mm-follow up CT in 3 months.    PET 12/27/23  Left Ventricle: The left ventricle is mildly dilated. Normal wall thickness. Moderate global hypokinesis present. There is moderately reduced systolic function with a visually estimated ejection fraction of 35 - 40%.  Right Ventricle: The right ventricle is normal in size Systolic function is normal.  Mitral Valve: There is mild regurgitation.  Tricuspid Valve: There is mild regurgitation.  Limited view study.     [facility] 11/10/17  LMCA: Calcified patent vessel, LAD: Calcified patent type II vessel that gives rise to a large patent D1 and D2 vessel. There were 3 subsequent small to medium diagonal branches. The 4th diagonal had an ostial 70% stenosis. Left Cx: nondominant patent vessel that contributed a single large OM, RCA-large calcified- LI, RCA large PDA and large YANIRA segment.    Past Medical History:   Diagnosis Date    Arthritis     Asthma     Atrial fibrillation     xarelto    Bladder cancer     Bladder disorder     CAD (coronary artery disease)     CHF (congestive heart failure)     COPD (chronic obstructive pulmonary disease)     Diverticulitis     Enlarged prostate     GERD (gastroesophageal reflux disease)     Heart damage     History of kidney stones     HLD (hyperlipidemia)     [place] (hard of hearing)     Insomnia     Obesity     Skin cancer     SOB (shortness of breath)     Stroke     Thyroid disease      Past Surgical History:   Procedure Laterality Date     ABLATION N/A 4/14/2025    Procedure: Ablation;  Surgeon: Jono Rutherford MD;  Location: [facility] CATH LAB;  Service: Cardiology;  Laterality: N/A;    BLADDER SURGERY      CERVICAL FUSION      CHOLECYSTECTOMY      CLOSURE OF LEFT ATRIAL APPENDAGE USING DEVICE N/A 4/14/2025    Procedure: Left atrial appendage closure device;  Surgeon: Jono Rutherford MD;  Location: [facility] CATH LAB;  Service: Cardiology;  Laterality: N/A;  WATCHMAN W/ KIRBY + AF CATH ABLATION    COLONOSCOPY      ECHOCARDIOGRAM,TRANSESOPHAGEAL N/A 4/14/2025    Procedure: Transesophageal echo (KIRBY) intra-procedure log documentation;  Surgeon: Provider, Dosc Diagnostic;  Location: [facility] CATH LAB;  Service: Cardiology;  Laterality: N/A;    ESOPHAGOGASTRODUODENOSCOPY      EXCISION OF PTERYGIUM Left 06/25/2024    Procedure: EXCISION, PTERYGIUM WITH MMC AND CONJ AUTOGRAFT - OS;  Surgeon: Rohith Crespo MD;  [place] [facility] [place];  Service: Ophthalmology;  Laterality: Left;    EXCISIONAL HEMORRHOIDECTOMY      FUSION OF SACROILIAC JOINT      INSERTION OF PACEMAKER      2021    PHACOEMULSIFICATION, CATARACT, WITH IOL INSERTION Right 08/27/2024    Procedure: PHACOEMULSIFICATION, CATARACT, WITH IOL INSERTION- OD;  Surgeon: Rohith Crespo MD;  Location: [facility];  Service: Ophthalmology;  Laterality: Right;    PHACOEMULSIFICATION, CATARACT, WITH IOL INSERTION Left 09/03/2024    Procedure: PHACOEMULSIFICATION, CATARACT, WITH IOL INSERTION- OS;  Surgeon: Rohith Crespo MD;  Location: [facility] [place];  Service: Ophthalmology;  Laterality: Left;    REPLACEMENT OF IMPLANTABLE CARDIOVERTER-DEFIBRILLATOR (ICD) BATTERY  07/2022    skin cancer removal Right     skin underneath the eye     Review of patient's allergies indicates:   Allergen Reactions    Aspirin      GI distress with med, hx gastric ulcer    Metoprolol      Current Facility-Administered Medications on File Prior to Encounter   Medication    0.9%  NaCl infusion    [COMPLETED] acetaminophen tablet 1,000 mg    [COMPLETED] furosemide  injection 40 mg    [COMPLETED] iohexoL (OMNIPAQUE 350) injection 100 mL    [COMPLETED] ipratropium 0.02 % nebulizer solution 0.5 mg    [COMPLETED] levalbuterol nebulizer solution 1.25 mg    [COMPLETED] levalbuterol nebulizer solution 1.25 mg    [COMPLETED] methylPREDNISolone sodium succinate injection 125 mg    sodium chloride 0.9% flush 10 mL    [DISCONTINUED] albuterol-ipratropium 2.5 mg-0.5 mg/3 mL nebulizer solution 3 mL    [DISCONTINUED] amiodarone tablet 200 mg    [DISCONTINUED] aspirin EC tablet 81 mg    [DISCONTINUED] azithromycin tablet 500 mg    [DISCONTINUED] clopidogreL tablet 600 mg    [DISCONTINUED] clopidogreL tablet 75 mg    [DISCONTINUED] furosemide injection 80 mg    [DISCONTINUED] morphine injection 4 mg    [DISCONTINUED] predniSONE tablet 20 mg     Current Outpatient Medications on File Prior to Encounter   Medication Sig    albuterol (VENTOLIN HFA) 90 mcg/actuation inhaler Inhale 2 puffs into the lungs every 6 (six) hours as needed for Wheezing. Rescue    albuterol-ipratropium (DUO-NEB) 2.5 mg-0.5 mg/3 mL nebulizer solution Take 3 mLs by nebulization every 6 (six) hours as needed for Wheezing. Rescue    alfuzosin (UROXATRAL) 10 mg Tb24 Take 10 mg by mouth once daily.    amLODIPine (NORVASC) 10 MG tablet Take 5 mg by mouth once daily.    aspirin (ECOTRIN) 81 MG EC tablet Take 1 tablet (81 mg total) by mouth once daily.    clopidogreL (PLAVIX) 75 mg tablet Take 1 tablet (75 mg total) by mouth once daily.    famotidine (PEPCID) 20 MG tablet Take 20 mg by mouth 2 (two) times daily.    finasteride (PROSCAR) 5 mg tablet Take 5 mg by mouth once daily.    levalbuterol (XOPENEX HFA) 45 mcg/actuation inhaler USE 2 PUFFS BY MOUTH EVERY 4 HOURS AS NEEDED    levothyroxine (SYNTHROID) 75 MCG tablet Take 75 mcg by mouth before breakfast.    methIMAzole (TAPAZOLE) 10 MG Tab Take 10 mg by mouth once daily.    pantoprazole (PROTONIX) 40 MG tablet Take 1 tablet (40 mg total) by mouth once daily.    pravastatin  (PRAVACHOL) 20 MG tablet Take 20 mg by mouth once daily.    propranoloL (INDERAL) 80 MG tablet Take 80 mg by mouth Daily.    TRELEGY ELLIPTA 100-62.5-25 mcg DsDv Inhale 1 puff into the lungs once daily.    vitamin D (VITAMIN D3) 1000 units Tab Take 1,000 Units by mouth once daily.    aluminum-magnesium hydroxide 200-200 mg/5 mL suspension Take by mouth every 6 (six) hours as needed for Indigestion.    amiodarone (PACERONE) 200 MG Tab Take 1 tablet by mouth Daily.    atorvastatin (LIPITOR) 40 MG tablet Take 40 mg by mouth every evening.    dapagliflozin propanediol (FARXIGA) 10 mg tablet Take 1 tablet (10 mg total) by mouth once daily.    ENTRESTO 24-26 mg per tablet Take 1 tablet by mouth 2 (two) times daily.    furosemide (LASIX) 20 MG tablet Take 20 mg by mouth every morning.    levothyroxine (SYNTHROID) 50 MCG tablet Take 75 mcg by mouth once daily. (Patient taking differently: Take 50 mcg by mouth once daily.)    loratadine (CLARITIN) 10 mg tablet Take 10 mg by mouth once daily.    methylPREDNISolone (MEDROL DOSEPACK) 4 mg tablet use as directed    mv-min-folic-K1-lycopen-lutein (CENTRUM SILVER MEN) 036--300 mcg Tab Take 1 tablet by mouth once daily.    tadalafiL (CIALIS) 5 MG tablet Take 5 mg by mouth daily as needed.    tamsulosin (FLOMAX) 0.4 mg Cap Take 0.4 mg by mouth once daily.     Family History       Problem Relation (Age of Onset)    Cancer Father    No Known Problems Mother          Tobacco Use    Smoking status: Former     Current packs/day: 1.00     Average packs/day: 1 pack/day for 20.0 years (20.0 ttl pk-yrs)     Types: Cigarettes    Smokeless tobacco: Never    Tobacco comments:     Quit 30 years ago   Substance and Sexual Activity    Alcohol use: Yes     Alcohol/week: 1.0 standard drink of alcohol     Types: 1 Cans of beer per week     Comment: 1 beer a week    Drug use: Never    Sexual activity: Not Currently       Review of Systems   Respiratory:  Positive for shortness of breath.   "  Cardiovascular:  Positive for leg swelling.   All other systems reviewed and are negative.    Objective:     Vital Signs (Most Recent):  Temp: 98.1 °F (36.7 °C) (07/10/25 2353)  Pulse: 96 (07/11/25 0742)  Resp: 18 (07/11/25 0742)  BP: 117/70 (07/11/25 0742)  SpO2: 96 % (07/11/25 0742) Vital Signs (24h Range):  Temp:  [98.1 °F (36.7 °C)-99.5 °F (37.5 °C)] 98.1 °F (36.7 °C)  Pulse:  [] 96  Resp:  [16-24] 18  SpO2:  [91 %-98 %] 96 %  BP: (101-140)/() 117/70   Weight: 91.2 kg (201 lb)  Body mass index is 29.68 kg/m².  SpO2: 96 %       Intake/Output Summary (Last 24 hours) at 7/11/2025 0820  Last data filed at 7/11/2025 0643  Gross per 24 hour   Intake 250 ml   Output 300 ml   Net -50 ml     Lines/Drains/Airways       Peripheral Intravenous Line  Duration             Peripheral IV 07/11/25 20 G Right Antecubital <1 day                  Significant Labs:   Chemistries:   Recent Labs   Lab 07/10/25  1742 07/11/25  0056 07/11/25 0338     --  140   K 3.7  --  4.1     --  103   CO2 30  --  25   BUN 12.5  --  13.1   CREATININE 0.74  --  0.69*   CALCIUM 9.0  --  8.6*   PROT 7.8*  --  7.3   BILITOT 1.3  --  1.5   ALKPHOS 73  --  71   ALT 16  --  14   AST 17  --  17   MG 1.80  --  1.80   TROPONINI 0.043 0.029  --         CBC/Anemia Labs: Coags:    Recent Labs   Lab 07/10/25  1742 07/11/25  0338   WBC 10.74 8.34   HGB 12.7* 12.2*   HCT 40.3* 37.9*    186   MCV 94.4* 91.3   RDW 14.6 14.9    No results for input(s): "PT", "INR", "APTT" in the last 168 hours.     Significant Imaging:  Imaging Results    None       EKG:     Telemetry:  AF    Physical Exam  Vitals reviewed.   Constitutional:       Appearance: Normal appearance.   HENT:      Mouth/Throat:      Mouth: Mucous membranes are moist.   Cardiovascular:      Rate and Rhythm: Normal rate. Rhythm irregular.      Pulses: Normal pulses.      Heart sounds: Normal heart sounds.   Pulmonary:      Effort: Pulmonary effort is normal.   Abdominal:      " General: Bowel sounds are normal.      Palpations: Abdomen is soft.   Musculoskeletal:      Cervical back: Normal range of motion.      Right lower leg: Edema present.      Left lower leg: Edema present.   Skin:     General: Skin is warm and dry.      Capillary Refill: Capillary refill takes less than 2 seconds.   Neurological:      Mental Status: He is alert and oriented to person, place, and time.       Home Medications:   Medications Ordered Prior to Encounter[1]  Current Schedule Inpatient Medications:   albuterol-ipratropium  3 mL Nebulization Q6H    amiodarone  200 mg Oral Daily    amLODIPine  5 mg Oral Daily    aspirin  81 mg Oral Daily    atorvastatin  40 mg Oral QHS    azithromycin  500 mg Intravenous Q24H    cefTRIAXone (Rocephin) IV (PEDS and ADULTS)  1 g Intravenous Q24H    clopidogreL  75 mg Oral Daily    fluticasone furoate  1 puff Inhalation Daily    And    umeclidinium-vilanteroL  1 puff Inhalation Daily    levothyroxine  50 mcg Oral Before breakfast    pantoprazole  40 mg Oral Daily     Continuous Infusions:    Assessment:   Acute/Chronic Systolic HF  Echo 6/4/25 EF 35-40%  S/p BiV AICD  Pneumonia  CT of the chest-infiltrate in the right lower lobe  NICMO  NML LHC 2017, PET 12/2023 no ischemia   PAF  S/p ablation/Watchman device  VT  HLD  Hypothyroidism            Plan:     Continue asa 81 mg po daily, amiodarone 200 mg po daily, atorvastatin 40 mg po daily  No BB due to allergy  Entresto on hold due to hypotension per last clinic visit  Cont lasix 40 mg IV bid  Resume dapagliflozin 10 mg po daily  Strict I& O  Keep K>4 and Mag>2  Labs in am: CBC, CMP and Mag    Thank you for your consult.     JORDAN KaurP  Cardiology  [facility]          [1]   Current Facility-Administered Medications on File Prior to Encounter   Medication Dose Route Frequency Provider Last Rate Last Admin    0.9%  NaCl infusion   Intravenous Once Jono Rutherford MD        [COMPLETED]  acetaminophen tablet 1,000 mg  1,000 mg Oral ED 1 Time Ramakrishna Sprague MD   1,000 mg at 07/10/25 1949    [COMPLETED] furosemide injection 40 mg  40 mg Intravenous ED 1 Time Ramakrishna Sprague MD   40 mg at 07/10/25 1832    [COMPLETED] iohexoL (OMNIPAQUE 350) injection 100 mL  100 mL Intravenous ONCE PRN Ramakrishna Sprague MD   100 mL at 07/10/25 1848    [COMPLETED] ipratropium 0.02 % nebulizer solution 0.5 mg  0.5 mg Nebulization ED 1 Time Ramakrishna Sprague MD   0.5 mg at 07/10/25 1858    [COMPLETED] levalbuterol nebulizer solution 1.25 mg  1.25 mg Nebulization ED 1 Time Ramakrishna Sprague MD   1.25 mg at 07/10/25 1815    [COMPLETED] levalbuterol nebulizer solution 1.25 mg  1.25 mg Nebulization ED 1 Time Ramakrishna Sprague MD   1.25 mg at 07/10/25 1857    [COMPLETED] methylPREDNISolone sodium succinate injection 125 mg  125 mg Intravenous ED 1 Time Ramakrishna Sprague MD   125 mg at 07/10/25 1832    sodium chloride 0.9% flush 10 mL  10 mL Intravenous PRN Jono Rutherford MD        [DISCONTINUED] albuterol-ipratropium 2.5 mg-0.5 mg/3 mL nebulizer solution 3 mL  3 mL Nebulization Q5 Min Trevor Coley MD        [DISCONTINUED] amiodarone tablet 200 mg  200 mg Oral Daily Jacquie, Bryan Q., NP        [DISCONTINUED] aspirin EC tablet 81 mg  81 mg Oral Daily Jacquie, Bryan Q., NP        [DISCONTINUED] azithromycin tablet 500 mg  500 mg Oral ED 1 Time Trevor Coley MD        [DISCONTINUED] clopidogreL tablet 600 mg  600 mg Oral ED 1 Time Trevor Coley MD        [DISCONTINUED] clopidogreL tablet 75 mg  75 mg Oral Daily Jacquie, Bryan Q., NP        [DISCONTINUED] furosemide injection 80 mg  80 mg Intravenous ED 1 Time Trevor Coley MD        [DISCONTINUED] morphine injection 4 mg  4 mg Intravenous ED 1 Time Trevor Coley MD        [DISCONTINUED] predniSONE tablet 20 mg  20 mg Oral ED 1 Time Trevor Coley MD         Current Outpatient Medications on File Prior to  Encounter   Medication Sig Dispense Refill    albuterol (VENTOLIN HFA) 90 mcg/actuation inhaler Inhale 2 puffs into the lungs every 6 (six) hours as needed for Wheezing. Rescue 18 g 11    albuterol-ipratropium (DUO-NEB) 2.5 mg-0.5 mg/3 mL nebulizer solution Take 3 mLs by nebulization every 6 (six) hours as needed for Wheezing. Rescue 150 mL 6    alfuzosin (UROXATRAL) 10 mg Tb24 Take 10 mg by mouth once daily.      amLODIPine (NORVASC) 10 MG tablet Take 5 mg by mouth once daily.      aspirin (ECOTRIN) 81 MG EC tablet Take 1 tablet (81 mg total) by mouth once daily. 360 tablet 0    clopidogreL (PLAVIX) 75 mg tablet Take 1 tablet (75 mg total) by mouth once daily. 30 tablet 6    famotidine (PEPCID) 20 MG tablet Take 20 mg by mouth 2 (two) times daily.      finasteride (PROSCAR) 5 mg tablet Take 5 mg by mouth once daily.      levalbuterol (XOPENEX HFA) 45 mcg/actuation inhaler USE 2 PUFFS BY MOUTH EVERY 4 HOURS AS NEEDED 15 g 11    levothyroxine (SYNTHROID) 75 MCG tablet Take 75 mcg by mouth before breakfast.      methIMAzole (TAPAZOLE) 10 MG Tab Take 10 mg by mouth once daily.      pantoprazole (PROTONIX) 40 MG tablet Take 1 tablet (40 mg total) by mouth once daily. 30 tablet 0    pravastatin (PRAVACHOL) 20 MG tablet Take 20 mg by mouth once daily.      propranoloL (INDERAL) 80 MG tablet Take 80 mg by mouth Daily.      TRELEGY ELLIPTA 100-62.5-25 mcg DsDv Inhale 1 puff into the lungs once daily. 60 each 11    vitamin D (VITAMIN D3) 1000 units Tab Take 1,000 Units by mouth once daily.      aluminum-magnesium hydroxide 200-200 mg/5 mL suspension Take by mouth every 6 (six) hours as needed for Indigestion.      amiodarone (PACERONE) 200 MG Tab Take 1 tablet by mouth Daily.      atorvastatin (LIPITOR) 40 MG tablet Take 40 mg by mouth every evening.      dapagliflozin propanediol (FARXIGA) 10 mg tablet Take 1 tablet (10 mg total) by mouth once daily. 30 tablet 1    ENTRESTO 24-26 mg per tablet Take 1 tablet by mouth 2 (two)  times daily. 60 tablet 12    furosemide (LASIX) 20 MG tablet Take 20 mg by mouth every morning.      levothyroxine (SYNTHROID) 50 MCG tablet Take 75 mcg by mouth once daily. (Patient taking differently: Take 50 mcg by mouth once daily.)      loratadine (CLARITIN) 10 mg tablet Take 10 mg by mouth once daily.      methylPREDNISolone (MEDROL DOSEPACK) 4 mg tablet use as directed 21 each 0    mv-min-folic-K1-lycopen-lutein (CENTRUM SILVER MEN) 289--300 mcg Tab Take 1 tablet by mouth once daily.      tadalafiL (CIALIS) 5 MG tablet Take 5 mg by mouth daily as needed.      tamsulosin (FLOMAX) 0.4 mg Cap Take 0.4 mg by mouth once daily.

## 2025-07-11 NOTE — H&P
[facility] Hospital Medicine History & Physical Examination       Patient Name: Babak Scales  MRN: [id number]  Patient Class: IP- Inpatient   Admission Date: 7/11/2025   Admitting Physician:  Service   Length of Stay: 0  Attending Physician: Yehuda Chery MD  Primary Care Provider: Guillermo Brown Jr., MD  Face-to-Face encounter date: 07/11/2025  Code Status: Full code  Chief Complaint: Transfer (Transfer from [facility] for Cardiology, trop 0.04 Cta chest showed atelectasis with inc effusion from previous scan. )      Screening for Social Drivers for health:  Patient screened for food insecurity, housing instability, transportation needs, utility difficulties, and interpersonal safety (select all that apply as identified as concern)  []Housing or Food  []Transportation Needs  []Utility Difficulties  []Interpersonal safety  [x]None      Patient information was obtained from patient, patient's family, past medical records and ER records.  ED records were reviewed in detail and documented below    HISTORY OF PRESENT ILLNESS:   79 year old man with a PMHx of COPD (2L at home), NICM s/p AICD, Afib s/p watchman, BPH, bladder cancer, HTN, HLD, CAD and CVA presented to the hospital for SOB. Reports he was in [place] previously and was discharged and he presented to the ER over there and was transferred for ER services.    Seen in the ER with his wife at bedside. Reports wheezing and LE edema. Trop negative. CT chest with RLL infiltrate.     PAST MEDICAL HISTORY:     Past Medical History:   Diagnosis Date    Arthritis     Asthma     Atrial fibrillation     xarelto    Bladder cancer     Bladder disorder     CAD (coronary artery disease)     CHF (congestive heart failure)     COPD (chronic obstructive pulmonary disease)     Diverticulitis     Enlarged prostate     GERD (gastroesophageal reflux disease)     Heart damage     History of kidney stones     HLD  (hyperlipidemia)     [place] (hard of hearing)     Insomnia     Obesity     Skin cancer     SOB (shortness of breath)     Stroke     Thyroid disease        PAST SURGICAL HISTORY:     Past Surgical History:   Procedure Laterality Date    ABLATION N/A 4/14/2025    Procedure: Ablation;  Surgeon: Jono Rutherford MD;  Location: [facility] CATH LAB;  Service: Cardiology;  Laterality: N/A;    BLADDER SURGERY      CERVICAL FUSION      CHOLECYSTECTOMY      CLOSURE OF LEFT ATRIAL APPENDAGE USING DEVICE N/A 4/14/2025    Procedure: Left atrial appendage closure device;  Surgeon: Jono Rutherford MD;  Location: [facility] CATH LAB;  Service: Cardiology;  Laterality: N/A;  WATCHMAN W/ KIRBY + AF CATH ABLATION    COLONOSCOPY      ECHOCARDIOGRAM,TRANSESOPHAGEAL N/A 4/14/2025    Procedure: Transesophageal echo (KIRBY) intra-procedure log documentation;  Surgeon: Provider, Dosc Diagnostic;  Location: [facility] CATH LAB;  Service: Cardiology;  Laterality: N/A;    ESOPHAGOGASTRODUODENOSCOPY      EXCISION OF PTERYGIUM Left 06/25/2024    Procedure: EXCISION, PTERYGIUM WITH MMC AND CONJ AUTOGRAFT - OS;  Surgeon: Rohith Crespo MD;  Location: [facility] OR;  Service: Ophthalmology;  Laterality: Left;    EXCISIONAL HEMORRHOIDECTOMY      FUSION OF SACROILIAC JOINT      INSERTION OF PACEMAKER      2021    PHACOEMULSIFICATION, CATARACT, WITH IOL INSERTION Right 08/27/2024    Procedure: PHACOEMULSIFICATION, CATARACT, WITH IOL INSERTION- OD;  Surgeon: Rohith Crespo MD;  Location: [facility] OR;  Service: Ophthalmology;  Laterality: Right;    PHACOEMULSIFICATION, CATARACT, WITH IOL INSERTION Left 09/03/2024    Procedure: PHACOEMULSIFICATION, CATARACT, WITH IOL INSERTION- OS;  Surgeon: Rohith Crespo MD;  Location: [facility] OR;  Service: Ophthalmology;  Laterality: Left;    REPLACEMENT OF IMPLANTABLE CARDIOVERTER-DEFIBRILLATOR (ICD) BATTERY  07/2022    skin cancer removal Right     skin underneath the eye       ALLERGIES:   Aspirin and Metoprolol    FAMILY HISTORY:   Reviewed and  negative    SOCIAL HISTORY:     Social History     Tobacco Use    Smoking status: Former     Current packs/day: 1.00     Average packs/day: 1 pack/day for 20.0 years (20.0 ttl pk-yrs)     Types: Cigarettes    Smokeless tobacco: Never    Tobacco comments:     Quit 30 years ago   Substance Use Topics    Alcohol use: Yes     Alcohol/week: 1.0 standard drink of alcohol     Types: 1 Cans of beer per week     Comment: 1 beer a week        HOME MEDICATIONS:     Prior to Admission medications    Medication Sig Start Date End Date Taking? Authorizing Provider   albuterol (VENTOLIN HFA) 90 mcg/actuation inhaler Inhale 2 puffs into the lungs every 6 (six) hours as needed for Wheezing. Rescue 6/16/25  Yes Obey Black MD   albuterol-ipratropium (DUO-NEB) 2.5 mg-0.5 mg/3 mL nebulizer solution Take 3 mLs by nebulization every 6 (six) hours as needed for Wheezing. Rescue 6/9/25 6/9/26 Yes Marva Schmitz MD   alfuzosin (UROXATRAL) 10 mg Tb24 Take 10 mg by mouth once daily. 4/12/22  Yes Provider, Historical   amLODIPine (NORVASC) 10 MG tablet Take 5 mg by mouth once daily.   Yes Provider, Historical   aspirin (ECOTRIN) 81 MG EC tablet Take 1 tablet (81 mg total) by mouth once daily. 5/22/25 5/22/26 Yes Jono Rutherford MD   clopidogreL (PLAVIX) 75 mg tablet Take 1 tablet (75 mg total) by mouth once daily. 5/22/25 5/22/26 Yes Jono Rutherford MD   famotidine (PEPCID) 20 MG tablet Take 20 mg by mouth 2 (two) times daily.   Yes Provider, Historical   finasteride (PROSCAR) 5 mg tablet Take 5 mg by mouth once daily.   Yes Provider, Historical   levalbuterol (XOPENEX HFA) 45 mcg/actuation inhaler USE 2 PUFFS BY MOUTH EVERY 4 HOURS AS NEEDED 7/9/25  Yes Obey Black MD   levothyroxine (SYNTHROID) 75 MCG tablet Take 75 mcg by mouth before breakfast.   Yes Provider, Historical   methIMAzole (TAPAZOLE) 10 MG Tab Take 10 mg by mouth once daily.   Yes Provider, Historical   pantoprazole (PROTONIX) 40 MG tablet Take 1 tablet (40 mg  total) by mouth once daily. 4/15/25 7/11/25 Yes Raj Noe, AGACNP-BC   pravastatin (PRAVACHOL) 20 MG tablet Take 20 mg by mouth once daily.   Yes Provider, Historical   propranoloL (INDERAL) 80 MG tablet Take 80 mg by mouth Daily.   Yes Provider, Historical   TRELEGY ELLIPTA 100-62.5-25 mcg DsDv Inhale 1 puff into the lungs once daily. 7/2/25  Yes Obey Black MD   vitamin D (VITAMIN D3) 1000 units Tab Take 1,000 Units by mouth once daily.   Yes Provider, Historical   aluminum-magnesium hydroxide 200-200 mg/5 mL suspension Take by mouth every 6 (six) hours as needed for Indigestion.    Provider, Historical   amiodarone (PACERONE) 200 MG Tab Take 1 tablet by mouth Daily. 7/2/22   Provider, Historical   atorvastatin (LIPITOR) 40 MG tablet Take 40 mg by mouth every evening. 4/4/22   Provider, Historical   dapagliflozin propanediol (FARXIGA) 10 mg tablet Take 1 tablet (10 mg total) by mouth once daily. 6/10/25 8/9/25  Marva Schmitz MD   ENTRESTO 24-26 mg per tablet Take 1 tablet by mouth 2 (two) times daily. 6/9/25   Marva Schmitz MD   furosemide (LASIX) 20 MG tablet Take 20 mg by mouth every morning. 7/16/22   Provider, Historical   levothyroxine (SYNTHROID) 50 MCG tablet Take 75 mcg by mouth once daily.  Patient taking differently: Take 50 mcg by mouth once daily. 4/8/22   Provider, Historical   loratadine (CLARITIN) 10 mg tablet Take 10 mg by mouth once daily. 7/16/22   Provider, Historical   methylPREDNISolone (MEDROL DOSEPACK) 4 mg tablet use as directed 6/9/25   Marva Schmitz MD   mv-min-folic-K1-lycopen-lutein (CENTRUM SILVER MEN) 744--300 mcg Tab Take 1 tablet by mouth once daily.    Provider, Historical   tadalafiL (CIALIS) 5 MG tablet Take 5 mg by mouth daily as needed. 2/10/25   Provider, Historical   tamsulosin (FLOMAX) 0.4 mg Cap Take 0.4 mg by mouth once daily.    Provider, Historical       REVIEW OF SYSTEMS:   Except as documented, all other systems reviewed and negative      PHYSICAL EXAM:     VITAL SIGNS: 24 HRS MIN & MAX LAST   Temp  Min: 98.1 °F (36.7 °C)  Max: 99.5 °F (37.5 °C) 98.1 °F (36.7 °C)   BP  Min: 101/73  Max: 144/88 (!) 144/88   Pulse  Min: 76  Max: 125  76   Resp  Min: 16  Max: 24 (!) 21   SpO2  Min: 91 %  Max: 98 % (!) 94 %     General appearance: Well-developed, well-nourished male in no apparent distress.  HENT: Atraumatic head. Moist mucous membranes of oral cavity.  Eyes: Normal extraocular movements.   Neck: Supple.   Lungs: bilateral wheezing present   Heart: Regular rate and rhythm. S1 and S2 present with no murmurs/gallop/rub. + 2 LE edema. No JVD present.   Abdomen: Soft, non-distended, non-tender. No rebound tenderness/guarding. Bowel sounds are normal.   Skin: No Rash.   Neuro: Motor and sensory exams grossly intact. Good tone. Muscle strength 5/5 in all 4 extremities    LABS AND IMAGING:     Recent Labs   Lab 07/10/25  1742 07/11/25  0338   WBC 10.74 8.34   RBC 4.27* 4.15*   HGB 12.7* 12.2*   HCT 40.3* 37.9*   MCV 94.4* 91.3   MCH 29.7 29.4   MCHC 31.5* 32.2*   RDW 14.6 14.9    186   MPV 8.6 8.9       Recent Labs   Lab 07/10/25  1742 07/11/25  0338    140   K 3.7 4.1    103   CO2 30 25   BUN 12.5 13.1   CREATININE 0.74 0.69*    178*   CALCIUM 9.0 8.6*   MG 1.80 1.80   ALBUMIN 3.6 3.5   PROT 7.8* 7.3   ALKPHOS 73 71   ALT 16 14   AST 17 17   BILITOT 1.3 1.5       Microbiology Results (last 7 days)       Procedure Component Value Units Date/Time    Respiratory Culture [[id number]]     Order Status: Sent Specimen: Sputum              CTA Chest Non-Coronary (PE Studies)  Narrative: EXAMINATION:  CTA CHEST NON CORONARY (PE STUDIES)    CLINICAL HISTORY:  Pulmonary embolism (PE) suspected, unknown D-dimer;    TECHNIQUE:  Low dose axial images, sagittal and coronal reformations were obtained from the thoracic inlet to the lung bases following the IV administration of contrast..  Contrast timing was optimized to evaluate the pulmonary  arteries.  MIP images were performed.  Automated exposure control was utilized    COMPARISON:  06/04/2024 chest CT    FINDINGS:  There are chronic interstitial lung changes seen bilaterally and some changes consistent with COPD in the lungs.  There is right lower lobe atelectasis and a reticulonodular infiltrate in the right lower lobe and in the right middle lobe.  This has shown mild progression since the prior examination.  Thoracic aorta is normal in caliber.    The heart is enlarged in size.    There is some mediastinal lymphadenopathy seen.  The largest lymph node is seen in the precarinal region and has a short axis dimension of 1.1 cm.  These are slightly more prominent than the prior examination and may represent reactive lymphadenopathy.  There is also an enlarged lymph node in the subcarinal region measured on image 66 series 3 that measures 2.2 x 1.4 cm.  This is slightly more prominent than the prior examination.    No pulmonary embolism is seen.  Impression: No pulmonary embolism seen    Reticulonodular interstitial infiltrate in the right lower lobe and right middle lobe appears slightly worse than the prior examination    Right lower lobe atelectasis and small effusion slightly worse than prior examination    Likely reactive mediastinal lymphadenopathy slightly more prominent than the prior examination    Electronically signed by: Abhijeet Hamilton  Date:    07/10/2025  Time:    19:04  X-Ray Chest AP Portable  Narrative: EXAMINATION:  XR CHEST AP PORTABLE    CLINICAL HISTORY:  Chest pain, unspecified    TECHNIQUE:  One view    COMPARISON:  Rachel 3, 2025.    FINDINGS:  Cardiopericardial silhouette enlarged appearance similar.  Left chest implanted cardiac device electrodes terminate within the right atrium and the right ventricle.  There is some chronic dilatation of central pulmonary arteries without overt interstitial and alveolar pulmonary edema.  No focally dense consolidation or significant fluid  within the pleural spaces.  No pneumothorax.  Impression: No acute cardiopulmonary process identified.    Electronically signed by: Raul Cordero  Date:    07/10/2025  Time:    17:52      ASSESSMENT & PLAN:   # Acute decompensated systolic heart failure  # Acute COPD exacerbation  # RLL PNA  # Acute hypoxic respiratory failure 2/2 above  # Hx of COPD (2L at home), NICM s/p AICD, Afib s/p watchman, BPH, bladder cancer, HTN, HLD, CAD and CVA    - start IV lasix 40 mg BID  - start IV solumedrol 40 mg every 8 hours and standing duonebs  - PPI while on steroids  - follow up Bcx  - sputum culture  - obtain MRSA nares  - continue azithromycin and ceftriaxone for now. Will add vanc if MRSA positive given recent hospitalization  - aspirin, plavix, statin  - rest of cardiology recommendations resumed as appropriate  - cardiology consult  - continue home oxygen  - telemetry monitoring  - K >4 and Mg > 2    Critical care note  Critical care diagnosis: Acute decompensated heart failure requiring IV lasix  Critical care time: 75 mins      VTE Prophylaxis: lovenox    Patient condition:  Fair    I spent 95 mins on this admission     Yehuda Chery MD  Department of Hospital Medicine   [facility]   07/11/2025     __________________________________________________________________________  INPATIENT LIST OF MEDICATIONS     Scheduled Meds:   albuterol-ipratropium  3 mL Nebulization Q6H    amiodarone  200 mg Oral Daily    amLODIPine  5 mg Oral Daily    aspirin  81 mg Oral Daily    atorvastatin  40 mg Oral QHS    azithromycin  500 mg Intravenous Q24H    cefTRIAXone (Rocephin) IV (PEDS and ADULTS)  1 g Intravenous Q24H    clopidogreL  75 mg Oral Daily    dapagliflozin propanediol  10 mg Oral Daily    enoxparin  40 mg Subcutaneous Q24H (prophylaxis, 1700)    fluticasone furoate  1 puff Inhalation Daily    And    umeclidinium-vilanteroL  1 puff Inhalation Daily    furosemide (LASIX) injection  40 mg  Intravenous Q12H    levothyroxine  50 mcg Oral Before breakfast    methylPREDNISolone injection (PEDS and ADULTS)  40 mg Intravenous Q8H    pantoprazole  40 mg Oral Daily     Continuous Infusions:  PRN Meds:.  Current Facility-Administered Medications:     acetaminophen, 650 mg, Oral, Q6H PRN    albuterol-ipratropium, 3 mL, Nebulization, Q4H PRN    aluminum-magnesium hydroxide-simethicone, 30 mL, Oral, QID PRN    bisacodyL, 10 mg, Rectal, Daily PRN    dextrose 50%, 12.5 g, Intravenous, PRN    dextrose 50%, 25 g, Intravenous, PRN    glucagon (human recombinant), 1 mg, Intramuscular, PRN    glucose, 16 g, Oral, PRN    glucose, 24 g, Oral, PRN    melatonin, 6 mg, Oral, Nightly PRN    naloxone, 0.02 mg, Intravenous, PRN    ondansetron, 4 mg, Intravenous, Q4H PRN    prochlorperazine, 5 mg, Intravenous, Q6H PRN    senna-docusate, 1 tablet, Oral, BID PRN    sodium chloride 0.9%, 10 mL, Intravenous, PRN        07/11/2025    ________________________________________________________________________________      Yehuda Chery MD   07/11/2025

## 2025-07-11 NOTE — CONSULTS
[facility] - Emergency Dept  Cardiology  Consult Note    Patient Name: Babak Scales  MRN: [id number]  Admission Date: 7/10/2025  Hospital Length of Stay: 0 days  Code Status: Prior   Attending Provider: Ramakrishna Sprague MD   Consulting Provider: Bryan Dhillon NP  Primary Care Physician: Guillermo Brown Jr., MD  Principal Problem:<principal problem not specified>    Patient information was obtained from patient, past medical records, ER records, and primary team.     Consults  Subjective:     Chief Complaint:    Telecardiology Consult  [facility] ER  Dr Sprague  CP  > 30 minutes including review of current and past medical records, provider and patient interview    HPI:   79-year-old male with a history of nonischemic cardiomyopathy, biventricular AICD which is a [employer] device, atrial fibrillation status post watchman, positive calcium score and bladder cancer.  Last coronary angiogram was November 2017 showing no significant coronary disease except a 70% fourth diagonal stenosis.  PET scan December 2023 shows no evidence of ischemia.  Echocardiogram performed June 2025 shows a EF of 35-40.    79-year-old male with a history of nonischemic cardiomyopathy, biventricular AICD which is a [employer] device, atrial fibrillation status post watchman, positive calcium score and bladder cancer.  Last coronary angiogram was November 2017 showing no significant coronary disease except a 70% fourth diagonal stenosis.  PET scan December 2023 shows no evidence of ischemia.  Echocardiogram performed June 2025 shows a EF of 35-40.    He presents back to the hospital with ongoing shortness of breath and wheezing.  He states over the past several days its gotten worse he tries to lay flat in his bed cannot do it has to sit up on the side of the bed to breathe.  EKG done upon arrival shows atrial fibrillation with a moderate to rapid ventricular response.  There is no ischemic changes.  Chest x-ray shows some  infiltrates.  CTA shows infiltrates that have been worsening.  Lab work shows negative troponin and a minimally evaded BNP.  He was given 40 mg of Lasix and is diuresing quite well.    Past Medical History:   Diagnosis Date    Arthritis     Asthma     Atrial fibrillation     xarelto    Bladder cancer     Bladder disorder     CAD (coronary artery disease)     CHF (congestive heart failure)     COPD (chronic obstructive pulmonary disease)     Diverticulitis     Enlarged prostate     GERD (gastroesophageal reflux disease)     Heart damage     History of kidney stones     HLD (hyperlipidemia)     [place] (hard of hearing)     Insomnia     Obesity     Skin cancer     SOB (shortness of breath)     Stroke     Thyroid disease        Past Surgical History:   Procedure Laterality Date    ABLATION N/A 4/14/2025    Procedure: Ablation;  Surgeon: Jono Rutherford MD;  Location: [facility] CATH LAB;  Service: Cardiology;  Laterality: N/A;    BLADDER SURGERY      CERVICAL FUSION      CHOLECYSTECTOMY      CLOSURE OF LEFT ATRIAL APPENDAGE USING DEVICE N/A 4/14/2025    Procedure: Left atrial appendage closure device;  Surgeon: Jono Rutherford MD;  Location: [facility] CATH LAB;  Service: Cardiology;  Laterality: N/A;  WATCHMAN W/ KIRBY + AF CATH ABLATION    COLONOSCOPY      ECHOCARDIOGRAM,TRANSESOPHAGEAL N/A 4/14/2025    Procedure: Transesophageal echo (KIRBY) intra-procedure log documentation;  Surgeon: Provider, Dosc [facility];  Location: [facility] CATH LAB;  Service: Cardiology;  Laterality: N/A;    ESOPHAGOGASTRODUODENOSCOPY      EXCISION OF PTERYGIUM Left 06/25/2024    Procedure: EXCISION, PTERYGIUM WITH MMC AND CONJ AUTOGRAFT - OS;  Surgeon: Rohith Crespo MD;  Location: [facility] OR;  Service: Ophthalmology;  Laterality: Left;    EXCISIONAL HEMORRHOIDECTOMY      FUSION OF SACROILIAC JOINT      INSERTION OF PACEMAKER      2021    PHACOEMULSIFICATION, CATARACT, WITH IOL INSERTION Right 08/27/2024    Procedure: PHACOEMULSIFICATION, CATARACT, WITH IOL INSERTION-  OD;  Surgeon: Rohith Crespo MD;  Location: [facility] OR;  Service: Ophthalmology;  Laterality: Right;    PHACOEMULSIFICATION, CATARACT, WITH IOL INSERTION Left 09/03/2024    Procedure: PHACOEMULSIFICATION, CATARACT, WITH IOL INSERTION- OS;  Surgeon: Rohith Crespo MD;  Location: [facility] OR;  Service: Ophthalmology;  Laterality: Left;    REPLACEMENT OF IMPLANTABLE CARDIOVERTER-DEFIBRILLATOR (ICD) BATTERY  07/2022    skin cancer removal Right     skin underneath the eye       Review of patient's allergies indicates:   Allergen Reactions    Aspirin      GI distress with med, hx gastric ulcer    Metoprolol        Current Facility-Administered Medications on File Prior to Encounter   Medication    0.9%  NaCl infusion    sodium chloride 0.9% flush 10 mL     Current Outpatient Medications on File Prior to Encounter   Medication Sig    albuterol (VENTOLIN HFA) 90 mcg/actuation inhaler Inhale 2 puffs into the lungs every 6 (six) hours as needed for Wheezing. Rescue    albuterol-ipratropium (DUO-NEB) 2.5 mg-0.5 mg/3 mL nebulizer solution Take 3 mLs by nebulization every 6 (six) hours as needed for Wheezing. Rescue    alfuzosin (UROXATRAL) 10 mg Tb24 Take 10 mg by mouth once daily.    amiodarone (PACERONE) 200 MG Tab Take 1 tablet by mouth Daily.    aspirin (ECOTRIN) 81 MG EC tablet Take 1 tablet (81 mg total) by mouth once daily.    atorvastatin (LIPITOR) 40 MG tablet Take 40 mg by mouth every evening.    clopidogreL (PLAVIX) 75 mg tablet Take 1 tablet (75 mg total) by mouth once daily.    dapagliflozin propanediol (FARXIGA) 10 mg tablet Take 1 tablet (10 mg total) by mouth once daily.    ENTRESTO 24-26 mg per tablet Take 1 tablet by mouth 2 (two) times daily.    furosemide (LASIX) 20 MG tablet Take 20 mg by mouth every morning.    levalbuterol (XOPENEX HFA) 45 mcg/actuation inhaler USE 2 PUFFS BY MOUTH EVERY 4 HOURS AS NEEDED    levothyroxine (SYNTHROID) 50 MCG tablet Take 50 mcg by mouth once daily.    loratadine (CLARITIN) 10  mg tablet Take 10 mg by mouth once daily.    methylPREDNISolone (MEDROL DOSEPACK) 4 mg tablet use as directed    mv-min-folic-K1-lycopen-lutein (CENTRUM SILVER MEN) 934--300 mcg Tab Take 1 tablet by mouth once daily.    pantoprazole (PROTONIX) 40 MG tablet Take 1 tablet (40 mg total) by mouth once daily.    tadalafiL (CIALIS) 5 MG tablet Take 5 mg by mouth daily as needed.    tamsulosin (FLOMAX) 0.4 mg Cap Take 0.4 mg by mouth once daily.    TRELEGY ELLIPTA 100-62.5-25 mcg DsDv Inhale 1 puff into the lungs once daily.     Family History       Problem Relation (Age of Onset)    Cancer Father    No Known Problems Mother          Tobacco Use    Smoking status: Former     Current packs/day: 1.00     Average packs/day: 1 pack/day for 20.0 years (20.0 ttl pk-yrs)     Types: Cigarettes    Smokeless tobacco: Never    Tobacco comments:     Quit 30 years ago   Substance and Sexual Activity    Alcohol use: Yes     Alcohol/week: 1.0 standard drink of alcohol     Types: 1 Cans of beer per week     Comment: 1 beer a week    Drug use: Never    Sexual activity: Not Currently     Review of Systems   Constitutional: Positive for malaise/fatigue.   HENT: Negative.     Eyes: Negative.    Cardiovascular:  Positive for chest pain, dyspnea on exertion, irregular heartbeat and orthopnea.   Respiratory:  Positive for shortness of breath and wheezing.    Endocrine: Negative.    Musculoskeletal: Negative.    Gastrointestinal: Negative.    Genitourinary: Negative.    Neurological: Negative.    Psychiatric/Behavioral: Negative.       Objective:     Vital Signs (Most Recent):  Temp: 99 °F (37.2 °C) (07/10/25 1800)  Pulse: (!) 117 (07/10/25 1857)  Resp: 20 (07/10/25 1857)  BP: 118/65 (07/10/25 1837)  SpO2: (!) 93 % (07/10/25 1857) Vital Signs (24h Range):  Temp:  [99 °F (37.2 °C)-99.5 °F (37.5 °C)] 99 °F (37.2 °C)  Pulse:  [] 117  Resp:  [17-24] 20  SpO2:  [91 %-95 %] 93 %  BP: (101-140)/() 118/65     Weight: 91.2 kg (201  lb)  Body mass index is 29.68 kg/m².    SpO2: (!) 93 %       No intake or output data in the 24 hours ending 07/10/25 1933    Lines/Drains/Airways       Peripheral Intravenous Line  Duration             Peripheral IV Single Lumen 07/10/25 1809 20 G Anterior;Distal;Right Upper Arm <1 day                    Physical Exam  Constitutional:       Appearance: Normal appearance.   HENT:      Head: Normocephalic.      Nose: Nose normal.      Mouth/Throat:      Mouth: Mucous membranes are moist.   Eyes:      Extraocular Movements: Extraocular movements intact.   Cardiovascular:      Rate and Rhythm: Tachycardia present. Rhythm irregular.   Pulmonary:      Breath sounds: Wheezing present.      Comments: Diminished bilat  Abdominal:      General: Bowel sounds are normal.   Musculoskeletal:         General: Normal range of motion.   Skin:     General: Skin is warm and dry.   Neurological:      General: No focal deficit present.      Mental Status: He is alert and oriented to person, place, and time.   Psychiatric:         Mood and Affect: Mood normal.         Behavior: Behavior normal.         Significant Labs:   Recent Lab Results         07/10/25  1742        Albumin/Globulin Ratio 0.9       Albumin 3.6       ALP 73       ALT 16       Anion Gap 7.0       AST 17       Baso # 0.05       Basophil % 0.5       BILIRUBIN TOTAL 1.3       .5       BUN 12.5       BUN/CREAT RATIO 17       Calcium 9.0       Chloride 104       CO2 30       Creatinine 0.74       eGFR >60  Comment: Estimated GFR calculated using the CKD-EPI creatinine (2021) equation.       Eos # 0.08       Eos % 0.7       Globulin, Total 4.2       Glucose 107       Hematocrit 40.3       Hemoglobin 12.7       Immature Grans (Abs) 0.02       Immature Granulocytes 0.2       Lymph # 1.50       LYMPH % 14.0       Magnesium  1.80       MCH 29.7       MCHC 31.5       MCV 94.4       Mono # 1.00       Mono % 9.3       MPV 8.6       Neut # 8.09       Neut % 75.3        Platelet Count 191       Potassium 3.7       PROTEIN TOTAL 7.8       RBC 4.27       RDW 14.6       Sodium 141       Troponin I 0.043       WBC 10.74               Significant Imaging:   EKG: Afiv RVR  CXR; Infiltrates  CT: Infiltrates worsening  Assessment and Plan:     Impression:  SOB/CP  - EKG no new changes, Initial trop negative  - Congestion, EF 35% - responed well to IV lasix  PNA - infiltrates  - per primary team  Nonischemic CMO  - EF 35%  - NML LHC 2017, PET 12/2023 no ischemia  - Entresto on hold due to borderline BP   Afib  - Wartchman    Plan:  Agree with IV Lasix  Continue to cycle cardiac biomarkers  A.m. electrolytes  Keep potassium greater than 4 and magnesium greater than 2  Will resume aspirin, Plavix  May use metoprolol 5 mg as needed heart rate sustained greater than 120  Increasing infiltrates per primary team  Feel he will need to be transferred to facility where interventional cardiology is available  We will be happy to follow him on consult while he is there  There are no hospital problems to display for this patient.      VTE Risk Mitigation (From admission, onward)      None            Thank you for your consult.     Bryan Dhillon NP  Cardiology   [facility] - Emergency Dept

## 2025-07-11 NOTE — PLAN OF CARE
Pt is , 4 children, no living will or POA. PCP Stephanie.    07/11/25 1218   Discharge Assessment   Assessment Type Discharge Planning Assessment   Confirmed/corrected address, phone number and insurance Yes   Confirmed Demographics Correct on Facesheet   Source of Information patient   Communicated FREDO with patient/caregiver Date not available/Unable to determine   People in Home spouse   Name(s) of People in Home Sharon Scales (Spouse)  [phone]   Do you expect to return to your current living situation? Yes   Do you have help at home or someone to help you manage your care at home? Yes   Who are your caregiver(s) and their phone number(s)? Sharon Scales (Spouse)  [phone]   Prior to hospitilization cognitive status: Unable to Assess   Current cognitive status: Alert/Oriented   Walking or Climbing Stairs Difficulty no   Dressing/Bathing Difficulty no   Home Accessibility wheelchair accessible   Home Layout Able to live on 1st floor   Equipment Currently Used at Home other (see comments);oxygen;blood pressure machine  (watchman device, oxygen PRN)   Readmission within 30 days? No   Patient currently being followed by outpatient case management? No   Do you currently have service(s) that help you manage your care at home? No   Do you take prescription medications? Yes  (Fills with [facility])   Do you have prescription coverage? Yes  (Medicare)   Do you have any problems affording any of your prescribed medications? No   Is the patient taking medications as prescribed? yes   Who is going to help you get home at discharge? son   How do you get to doctors appointments? car, drives self;family or friend will provide   Are you on dialysis? No   Do you take coumadin? No   Discharge Plan A Other  (TBD)   DME Needed Upon Discharge  other (see comments)  (TBD)   Discharge Plan discussed with: Patient;Spouse/sig other   Name(s) and Number(s) Sharon Scales (Spouse)  [phone]   Transition of Care  Barriers None   Physical Activity   On average, how many days per week do you engage in moderate to strenuous exercise (like a brisk walk)? 0 days  (none at present due to breathing but previously active)   On average, how many minutes do you engage in exercise at this level? 0 min   Financial Resource Strain   How hard is it for you to pay for the very basics like food, housing, medical care, and heating? Not very   Housing Stability   In the last 12 months, was there a time when you were not able to pay the mortgage or rent on time? N   At any time in the past 12 months, were you homeless or living in a shelter (including now)? N   Transportation Needs   In the past 12 months, has lack of transportation kept you from medical appointments or from getting medications? no   In the past 12 months, has lack of transportation kept you from meetings, work, or from getting things needed for daily living? No   Food Insecurity   Within the past 12 months, you worried that your food would run out before you got the money to buy more. Never true   Within the past 12 months, the food you bought just didn't last and you didn't have money to get more. Never true   Stress   Do you feel stress - tense, restless, nervous, or anxious, or unable to sleep at night because your mind is troubled all the time - these days? Not at all   Social Isolation   How often do you feel lonely or isolated from those around you?  Never   Alcohol Use   Q1: How often do you have a drink containing alcohol? Never   Q2: How many drinks containing alcohol do you have on a typical day when you are drinking? None   Q3: How often do you have six or more drinks on one occasion? Never   [employer]   In the past 12 months has the electric, gas, oil, or water company threatened to shut off services in your home? No   Health Literacy   How often do you need to have someone help you when you read instructions, pamphlets, or other written material from your doctor  or pharmacy? Rarely

## 2025-07-12 LAB
ALBUMIN SERPL-MCNC: 3.2 G/DL (ref 3.4–4.8)
ALBUMIN/GLOB SERPL: 0.9 RATIO (ref 1.1–2)
ALP SERPL-CCNC: 62 UNIT/L (ref 40–150)
ALT SERPL-CCNC: 16 UNIT/L (ref 0–55)
ANION GAP SERPL CALC-SCNC: 10 MEQ/L
AST SERPL-CCNC: 18 UNIT/L (ref 11–45)
BASOPHILS # BLD AUTO: 0.02 X10(3)/MCL
BASOPHILS NFR BLD AUTO: 0.1 %
BILIRUB SERPL-MCNC: 1 MG/DL
BUN SERPL-MCNC: 22 MG/DL (ref 8.4–25.7)
CALCIUM SERPL-MCNC: 8.6 MG/DL (ref 8.8–10)
CHLORIDE SERPL-SCNC: 102 MMOL/L (ref 98–107)
CO2 SERPL-SCNC: 29 MMOL/L (ref 23–31)
CREAT SERPL-MCNC: 0.68 MG/DL (ref 0.72–1.25)
CREAT/UREA NIT SERPL: 32
EOSINOPHIL # BLD AUTO: 0 X10(3)/MCL (ref 0–0.9)
EOSINOPHIL NFR BLD AUTO: 0 %
ERYTHROCYTE [DISTWIDTH] IN BLOOD BY AUTOMATED COUNT: 15.3 % (ref 11.5–17)
GFR SERPLBLD CREATININE-BSD FMLA CKD-EPI: >60 ML/MIN/1.73/M2
GLOBULIN SER-MCNC: 3.7 GM/DL (ref 2.4–3.5)
GLUCOSE SERPL-MCNC: 148 MG/DL (ref 82–115)
HCT VFR BLD AUTO: 34.9 % (ref 42–52)
HGB BLD-MCNC: 11.4 G/DL (ref 14–18)
IMM GRANULOCYTES # BLD AUTO: 0.07 X10(3)/MCL (ref 0–0.04)
IMM GRANULOCYTES NFR BLD AUTO: 0.5 %
LYMPHOCYTES # BLD AUTO: 0.81 X10(3)/MCL (ref 0.6–4.6)
LYMPHOCYTES NFR BLD AUTO: 5.4 %
MAGNESIUM SERPL-MCNC: 2.2 MG/DL (ref 1.6–2.6)
MCH RBC QN AUTO: 30.1 PG (ref 27–31)
MCHC RBC AUTO-ENTMCNC: 32.7 G/DL (ref 33–36)
MCV RBC AUTO: 92.1 FL (ref 80–94)
MONOCYTES # BLD AUTO: 0.56 X10(3)/MCL (ref 0.1–1.3)
MONOCYTES NFR BLD AUTO: 3.7 %
NEUTROPHILS # BLD AUTO: 13.61 X10(3)/MCL (ref 2.1–9.2)
NEUTROPHILS NFR BLD AUTO: 90.3 %
NRBC BLD AUTO-RTO: 0.1 %
PLATELET # BLD AUTO: 223 X10(3)/MCL (ref 130–400)
PMV BLD AUTO: 9.6 FL (ref 7.4–10.4)
POTASSIUM SERPL-SCNC: 4.1 MMOL/L (ref 3.5–5.1)
PROT SERPL-MCNC: 6.9 GM/DL (ref 5.8–7.6)
RBC # BLD AUTO: 3.79 X10(6)/MCL (ref 4.7–6.1)
SODIUM SERPL-SCNC: 141 MMOL/L (ref 136–145)
WBC # BLD AUTO: 15.07 X10(3)/MCL (ref 4.5–11.5)

## 2025-07-12 PROCEDURE — 85025 COMPLETE CBC W/AUTO DIFF WBC: CPT | Performed by: NURSE PRACTITIONER

## 2025-07-12 PROCEDURE — 27000221 HC OXYGEN, UP TO 24 HOURS

## 2025-07-12 PROCEDURE — 25000003 PHARM REV CODE 250: Performed by: NURSE PRACTITIONER

## 2025-07-12 PROCEDURE — 63600175 PHARM REV CODE 636 W HCPCS

## 2025-07-12 PROCEDURE — 63600175 PHARM REV CODE 636 W HCPCS: Performed by: SPECIALIST

## 2025-07-12 PROCEDURE — 21400001 HC TELEMETRY ROOM

## 2025-07-12 PROCEDURE — 94640 AIRWAY INHALATION TREATMENT: CPT

## 2025-07-12 PROCEDURE — 83735 ASSAY OF MAGNESIUM: CPT | Performed by: NURSE PRACTITIONER

## 2025-07-12 PROCEDURE — 63600175 PHARM REV CODE 636 W HCPCS: Performed by: NURSE PRACTITIONER

## 2025-07-12 PROCEDURE — 94760 N-INVAS EAR/PLS OXIMETRY 1: CPT

## 2025-07-12 PROCEDURE — 25000242 PHARM REV CODE 250 ALT 637 W/ HCPCS

## 2025-07-12 PROCEDURE — 99900035 HC TECH TIME PER 15 MIN (STAT)

## 2025-07-12 PROCEDURE — 94761 N-INVAS EAR/PLS OXIMETRY MLT: CPT

## 2025-07-12 PROCEDURE — 80053 COMPREHEN METABOLIC PANEL: CPT | Performed by: NURSE PRACTITIONER

## 2025-07-12 PROCEDURE — 99900031 HC PATIENT EDUCATION (STAT)

## 2025-07-12 PROCEDURE — 36415 COLL VENOUS BLD VENIPUNCTURE: CPT | Performed by: NURSE PRACTITIONER

## 2025-07-12 PROCEDURE — 25000003 PHARM REV CODE 250: Performed by: SPECIALIST

## 2025-07-12 PROCEDURE — 25000003 PHARM REV CODE 250

## 2025-07-12 RX ORDER — AMIODARONE HYDROCHLORIDE 200 MG/1
200 TABLET ORAL 2 TIMES DAILY
Status: DISCONTINUED | OUTPATIENT
Start: 2025-07-12 | End: 2025-07-14

## 2025-07-12 RX ORDER — DIGOXIN 0.25 MG/ML
250 INJECTION INTRAMUSCULAR; INTRAVENOUS ONCE
Status: COMPLETED | OUTPATIENT
Start: 2025-07-12 | End: 2025-07-12

## 2025-07-12 RX ADMIN — AMLODIPINE BESYLATE 5 MG: 5 TABLET ORAL at 09:07

## 2025-07-12 RX ADMIN — METHYLPREDNISOLONE SODIUM SUCCINATE 40 MG: 40 INJECTION, POWDER, FOR SOLUTION INTRAMUSCULAR; INTRAVENOUS at 05:07

## 2025-07-12 RX ADMIN — CEFTRIAXONE SODIUM 1 G: 1 INJECTION, POWDER, FOR SOLUTION INTRAMUSCULAR; INTRAVENOUS at 03:07

## 2025-07-12 RX ADMIN — IPRATROPIUM BROMIDE AND ALBUTEROL SULFATE 3 ML: .5; 3 SOLUTION RESPIRATORY (INHALATION) at 12:07

## 2025-07-12 RX ADMIN — IPRATROPIUM BROMIDE AND ALBUTEROL SULFATE 3 ML: .5; 3 SOLUTION RESPIRATORY (INHALATION) at 08:07

## 2025-07-12 RX ADMIN — SENNOSIDES, DOCUSATE SODIUM 1 TABLET: 8.6; 5 TABLET ORAL at 02:07

## 2025-07-12 RX ADMIN — AMIODARONE HYDROCHLORIDE 200 MG: 200 TABLET ORAL at 10:07

## 2025-07-12 RX ADMIN — DAPAGLIFLOZIN 10 MG: 10 TABLET, FILM COATED ORAL at 09:07

## 2025-07-12 RX ADMIN — ENOXAPARIN SODIUM 40 MG: 40 INJECTION SUBCUTANEOUS at 05:07

## 2025-07-12 RX ADMIN — LEVOTHYROXINE SODIUM 50 MCG: 0.05 TABLET ORAL at 05:07

## 2025-07-12 RX ADMIN — FLUTICASONE FUROATE 1 PUFF: 100 POWDER RESPIRATORY (INHALATION) at 09:07

## 2025-07-12 RX ADMIN — CLOPIDOGREL 75 MG: 75 TABLET ORAL at 09:07

## 2025-07-12 RX ADMIN — IPRATROPIUM BROMIDE AND ALBUTEROL SULFATE 3 ML: .5; 3 SOLUTION RESPIRATORY (INHALATION) at 02:07

## 2025-07-12 RX ADMIN — METHYLPREDNISOLONE SODIUM SUCCINATE 40 MG: 40 INJECTION, POWDER, FOR SOLUTION INTRAMUSCULAR; INTRAVENOUS at 02:07

## 2025-07-12 RX ADMIN — UMECLIDINIUM BROMIDE AND VILANTEROL TRIFENATATE 1 PUFF: 62.5; 25 POWDER RESPIRATORY (INHALATION) at 09:07

## 2025-07-12 RX ADMIN — FUROSEMIDE 40 MG: 10 INJECTION, SOLUTION INTRAVENOUS at 10:07

## 2025-07-12 RX ADMIN — METHYLPREDNISOLONE SODIUM SUCCINATE 40 MG: 40 INJECTION, POWDER, FOR SOLUTION INTRAMUSCULAR; INTRAVENOUS at 10:07

## 2025-07-12 RX ADMIN — ASPIRIN 81 MG: 81 TABLET, DELAYED RELEASE ORAL at 09:07

## 2025-07-12 RX ADMIN — PANTOPRAZOLE SODIUM 40 MG: 40 TABLET, DELAYED RELEASE ORAL at 09:07

## 2025-07-12 RX ADMIN — FUROSEMIDE 40 MG: 10 INJECTION, SOLUTION INTRAVENOUS at 09:07

## 2025-07-12 RX ADMIN — ATORVASTATIN CALCIUM 40 MG: 40 TABLET, FILM COATED ORAL at 10:07

## 2025-07-12 RX ADMIN — DIGOXIN 250 MCG: 0.25 INJECTION INTRAMUSCULAR; INTRAVENOUS at 02:07

## 2025-07-12 RX ADMIN — AZITHROMYCIN DIHYDRATE 500 MG: 500 INJECTION, POWDER, LYOPHILIZED, FOR SOLUTION INTRAVENOUS at 03:07

## 2025-07-12 NOTE — PROGRESS NOTES
[facility] Hospital Medicine Progress Note        Chief Complaint: Inpatient Follow-up     HPI:   79 year old man with a PMHx of COPD (2L at home), NICM s/p AICD, Afib s/p watchman, BPH, bladder cancer, HTN, HLD, CAD and CVA presented to the hospital for SOB. Reports he was in [place] previously and was discharged and he presented to the ER over there and was transferred for ER services.     Seen in the ER with his wife at bedside. Reports wheezing and LE edema. Trop negative. CT chest with RLL infiltrate.     Started on IV diuresis and IV steroids.     Interval Hx:   Reports SOB improving - wheezing still present but better. Still has LE edema.     Case was discussed with patient's nurse and  on the floor.    Objective/physical exam:  General: In no acute distress, afebrile  Chest: mild wheezing bilaterally  Heart: RRR, +S1, S2, no appreciable murmur  Abdomen: Soft, nontender, BS +, has ventral hernia that is reducible  MSK: Warm, +2 lower extremity edema, no clubbing or cyanosis  Neurologic: Alert and oriented x4, Cranial nerve II-XII intact, Strength 5/5 in all 4 extremities    VITAL SIGNS: 24 HRS MIN & MAX LAST   Temp  Min: 97.8 °F (36.6 °C)  Max: 98.2 °F (36.8 °C) 98 °F (36.7 °C)   BP  Min: 111/65  Max: 144/88 116/71   Pulse  Min: 70  Max: 114  107   Resp  Min: 17  Max: 22 20   SpO2  Min: 92 %  Max: 97 % 95 %     I have reviewed the following labs:  Recent Labs   Lab 07/10/25  1742 07/11/25  0338 07/12/25  0604   WBC 10.74 8.34 15.07*   RBC 4.27* 4.15* 3.79*   HGB 12.7* 12.2* 11.4*   HCT 40.3* 37.9* 34.9*   MCV 94.4* 91.3 92.1   MCH 29.7 29.4 30.1   MCHC 31.5* 32.2* 32.7*   RDW 14.6 14.9 15.3    186 223   MPV 8.6 8.9 9.6     Recent Labs   Lab 07/10/25  1742 07/11/25  0338 07/12/25  0604    140 141   K 3.7 4.1 4.1    103 102   CO2 30 25 29   BUN 12.5 13.1 22.0   CREATININE 0.74 0.69* 0.68*    178* 148*   CALCIUM 9.0 8.6* 8.6*   MG 1.80 1.80  2.20   ALBUMIN 3.6 3.5 3.2*   PROT 7.8* 7.3 6.9   ALKPHOS 73 71 62   ALT 16 14 16   AST 17 17 18   BILITOT 1.3 1.5 1.0     Microbiology Results (last 7 days)       Procedure Component Value Units Date/Time    Respiratory Culture [[id number]] Collected: 07/11/25 1759    Order Status: Resulted Specimen: Sputum Updated: 07/11/25 1759             See below for Radiology    Assessment/Plan:  # Acute decompensated systolic heart failure  # Acute COPD exacerbation  # RLL PNA  # Acute hypoxic respiratory failure 2/2 above  # Steroid induced leukocytosis  # Ventral hernia - reducible  # Hx of COPD (2L at home), NICM s/p AICD, Afib s/p watchman, BPH, bladder cancer, HTN, HLD, CAD and CVA     - continue IV lasix 40 mg BID  - continue IV solumedrol 40 mg every 8 hours and standing duonebs  - PPI while on steroids  - follow up Bcx  - sputum culture  - MRSA nares negative  - continue azithromycin and ceftriaxone   - aspirin, plavix, statin  - rest of cardiology medications resumed as appropriate  - cardiology consult  - continue home oxygen  - telemetry monitoring  - K >4 and Mg > 2  - will do a walking test with the patient today  - outpatient follow up with surgery for his reducible ventral hernia     Critical care note  Critical care diagnosis: Acute decompensated heart failure requiring IV lasix  Critical care time: 75 mins    VTE prophylaxis: lovenox    Patient condition:  Fair    Anticipated discharge and Disposition:         All diagnosis and differential diagnosis have been reviewed; assessment and plan has been documented; I have personally reviewed the labs and test results that are presently available; I have reviewed the patients medication list; I have reviewed the consulting providers response and recommendations. I have reviewed or attempted to review medical records based upon their availability    All of the patient's questions have been  addressed and answered. Patient's is agreeable to the above stated plan. I  will continue to monitor closely and make adjustments to medical management as needed.    _____________________________________________________________________    Malnutrition Status:  Nutrition consulted. Most recent weight and BMI monitored-     Measurements:  Wt Readings from Last 1 Encounters:   07/11/25 91.1 kg (200 lb 12.8 oz)   Body mass index is 29.65 kg/m².    Patient has been screened and assessed by RD.    Malnutrition Type:  Context:    Level:      Malnutrition Characteristic Summary:       Interventions/Recommendations (treatment strategy):        Scheduled Med:   albuterol-ipratropium  3 mL Nebulization Q6H    amiodarone  200 mg Oral Daily    amLODIPine  5 mg Oral Daily    aspirin  81 mg Oral Daily    atorvastatin  40 mg Oral QHS    azithromycin  500 mg Intravenous Q24H    cefTRIAXone (Rocephin) IV (PEDS and ADULTS)  1 g Intravenous Q24H    clopidogreL  75 mg Oral Daily    dapagliflozin propanediol  10 mg Oral Daily    enoxparin  40 mg Subcutaneous Q24H (prophylaxis, 1700)    fluticasone furoate  1 puff Inhalation Daily    And    umeclidinium-vilanteroL  1 puff Inhalation Daily    furosemide (LASIX) injection  40 mg Intravenous Q12H    levothyroxine  50 mcg Oral Before breakfast    methylPREDNISolone injection (PEDS and ADULTS)  40 mg Intravenous Q8H    pantoprazole  40 mg Oral Daily      Continuous Infusions:     PRN Meds:    Current Facility-Administered Medications:     acetaminophen, 650 mg, Oral, Q6H PRN    albuterol-ipratropium, 3 mL, Nebulization, Q4H PRN    aluminum-magnesium hydroxide-simethicone, 30 mL, Oral, QID PRN    bisacodyL, 10 mg, Rectal, Daily PRN    dextrose 50%, 12.5 g, Intravenous, PRN    dextrose 50%, 25 g, Intravenous, PRN    glucagon (human recombinant), 1 mg, Intramuscular, PRN    glucose, 16 g, Oral, PRN    glucose, 24 g, Oral, PRN    melatonin, 6 mg, Oral, Nightly PRN    naloxone, 0.02 mg, Intravenous, PRN    ondansetron, 4 mg, Intravenous, Q4H PRN    prochlorperazine, 5 mg,  Intravenous, Q6H PRN    senna-docusate, 1 tablet, Oral, BID PRN    sodium chloride 0.9%, 10 mL, Intravenous, PRN     Radiology:  I have personally reviewed the following imaging and agree with the radiologist.     CTA Chest Non-Coronary (PE Studies)  Narrative: EXAMINATION:  CTA CHEST NON CORONARY (PE STUDIES)    CLINICAL HISTORY:  Pulmonary embolism (PE) suspected, unknown D-dimer;    TECHNIQUE:  Low dose axial images, sagittal and coronal reformations were obtained from the thoracic inlet to the lung bases following the IV administration of contrast..  Contrast timing was optimized to evaluate the pulmonary arteries.  MIP images were performed.  Automated exposure control was utilized    COMPARISON:  06/04/2024 chest CT    FINDINGS:  There are chronic interstitial lung changes seen bilaterally and some changes consistent with COPD in the lungs.  There is right lower lobe atelectasis and a reticulonodular infiltrate in the right lower lobe and in the right middle lobe.  This has shown mild progression since the prior examination.  Thoracic aorta is normal in caliber.    The heart is enlarged in size.    There is some mediastinal lymphadenopathy seen.  The largest lymph node is seen in the precarinal region and has a short axis dimension of 1.1 cm.  These are slightly more prominent than the prior examination and may represent reactive lymphadenopathy.  There is also an enlarged lymph node in the subcarinal region measured on image 66 series 3 that measures 2.2 x 1.4 cm.  This is slightly more prominent than the prior examination.    No pulmonary embolism is seen.  Impression: No pulmonary embolism seen    Reticulonodular interstitial infiltrate in the right lower lobe and right middle lobe appears slightly worse than the prior examination    Right lower lobe atelectasis and small effusion slightly worse than prior examination    Likely reactive mediastinal lymphadenopathy slightly more prominent than the prior  examination    Electronically signed by: Abhijeet Hamilton  Date:    07/10/2025  Time:    19:04  X-Ray Chest AP Portable  Narrative: EXAMINATION:  XR CHEST AP PORTABLE    CLINICAL HISTORY:  Chest pain, unspecified    TECHNIQUE:  One view    COMPARISON:  Rachel 3, 2025.    FINDINGS:  Cardiopericardial silhouette enlarged appearance similar.  Left chest implanted cardiac device electrodes terminate within the right atrium and the right ventricle.  There is some chronic dilatation of central pulmonary arteries without overt interstitial and alveolar pulmonary edema.  No focally dense consolidation or significant fluid within the pleural spaces.  No pneumothorax.  Impression: No acute cardiopulmonary process identified.    Electronically signed by: Raul Cordero  Date:    07/10/2025  Time:    17:52      Yehuda Chery MD  Department of Hospital Medicine   [facility]   07/12/2025

## 2025-07-12 NOTE — NURSING
6 min walking test:  At rest without O2 was-92%, walking without O2-his O2 dropped to 85%, place 2L O2 on pt, O2 bumped up to 93% and stayed within 90-92% while walking

## 2025-07-12 NOTE — PROGRESS NOTES
[facility]    Cardiology  Progress Note    Patient Name: Babak Scales  MRN: [id number]  Admission Date: 7/11/2025  Hospital Length of Stay: 1 days  Code Status: Full Code   Attending Physician: Yehuda Chery,*   Primary Care Physician: Guillermo Brown Jr., MD  Expected Discharge Date:   Principal Problem:<principal problem not specified>    Subjective:   HPI:   79-year-old male with a history of nonischemic cardiomyopathy, biventricular AICD which is a [employer] device, atrial fibrillation status post watchman, positive calcium score and bladder cancer.  Last coronary angiogram was November 2017 showing no significant coronary disease except a 70% fourth diagonal stenosis.  PET scan December 2023 shows no evidence of ischemia.  Echocardiogram performed June 2025 shows a EF of 35-40.  He presents back to the hospital with ongoing shortness of breath and wheezing.  He states over the past several days its gotten worse he tries to lay flat in his bed cannot do it has to sit up on the side of the bed to breathe.  EKG done upon arrival shows atrial fibrillation with a moderate to rapid ventricular response.  There is no ischemic changes.  Chest x-ray shows some infiltrates.  CTA shows infiltrates that have been worsening.  Lab work shows negative troponin and a minimally evaded BNP.  He was given 40 mg of Lasix and is diuresing quite well. [facility] is being consulted for CHF.      7.12.25:  improving with diuresis and antibiotics     PMH: Chronic systolic/diastolic HF, AF, BiV AICD, elevated coronary calcium score, bladder ca, GERD, HLD  PSH: A fib ablation, TIARA closure, cataract surgery  Family History: Father-Cancer, Mother-HTN, Brother-CAD  Social History: Ex smoker, Denied alcohol and drug use        Previous Cardiac Diagnostics:   Echocardiogram 6.4.25  Left Ventricle: The left ventricle is mildly dilated. Normal wall thickness. Moderate global hypokinesis present. There is  moderately reduced systolic function with a visually estimated ejection fraction of 35 - 40%.  Right Ventricle: The right ventricle is normal in size Systolic function is normal.  Mitral Valve: There is mild regurgitation.  Tricuspid Valve: There is mild regurgitation.  Limited view study.     CT of the chest 6.4.25  Scattered nodular opacities favored infection or inflammatory. Given the lower lobe nodule measures 9 mm-follow up CT in 3 months.     PET 12/27/23  Left Ventricle: The left ventricle is mildly dilated. Normal wall thickness. Moderate global hypokinesis present. There is moderately reduced systolic function with a visually estimated ejection fraction of 35 - 40%.  Right Ventricle: The right ventricle is normal in size Systolic function is normal.  Mitral Valve: There is mild regurgitation.  Tricuspid Valve: There is mild regurgitation.  Limited view study.     [facility] 11/10/17  LMCA: Calcified patent vessel, LAD: Calcified patent type II vessel that gives rise to a large patent D1 and D2 vessel. There were 3 subsequent small to medium diagonal branches. The 4th diagonal had an ostial 70% stenosis. Left Cx: nondominant patent vessel that contributed a single large OM, RCA-large calcified- LI, RCA large PDA and large YANIRA segment.          Past Medical History:   Diagnosis Date    Arthritis      Asthma      Atrial fibrillation       xarelto    Bladder cancer      Bladder disorder      CAD (coronary artery disease)      CHF (congestive heart failure)      COPD (chronic obstructive pulmonary disease)      Diverticulitis      Enlarged prostate      GERD (gastroesophageal reflux disease)      Heart damage      History of kidney stones      HLD (hyperlipidemia)      [place] (hard of hearing)      Insomnia      Obesity      Skin cancer      SOB (shortness of breath)      Stroke      Thyroid disease              Past Surgical History:   Procedure Laterality Date    ABLATION N/A 4/14/2025     Procedure: Ablation;   Surgeon: Jono Rutherford MD;  Location: [facility] CATH LAB;  Service: Cardiology;  Laterality: N/A;    BLADDER SURGERY        CERVICAL FUSION        CHOLECYSTECTOMY        CLOSURE OF LEFT ATRIAL APPENDAGE USING DEVICE N/A 4/14/2025     Procedure: Left atrial appendage closure device;  Surgeon: Jono Rutherford MD;  Location: [facility] CATH LAB;  Service: Cardiology;  Laterality: N/A;  WATCHMAN W/ KIRBY + AF CATH ABLATION    COLONOSCOPY        ECHOCARDIOGRAM,TRANSESOPHAGEAL N/A 4/14/2025     Procedure: Transesophageal echo (KIRBY) intra-procedure log documentation;  Surgeon: Provider, Dosmaria Diagnostic;  Location: [facility] CATH LAB;  Service: Cardiology;  Laterality: N/A;    ESOPHAGOGASTRODUODENOSCOPY        EXCISION OF PTERYGIUM Left 06/25/2024     Procedure: EXCISION, PTERYGIUM WITH MMC AND CONJ AUTOGRAFT - OS;  Surgeon: Rohith Crespo MD;  Location: [facility] OR;  Service: Ophthalmology;  Laterality: Left;    EXCISIONAL HEMORRHOIDECTOMY        FUSION OF SACROILIAC JOINT        INSERTION OF PACEMAKER         2021    PHACOEMULSIFICATION, CATARACT, WITH IOL INSERTION Right 08/27/2024     Procedure: PHACOEMULSIFICATION, CATARACT, WITH IOL INSERTION- OD;  Surgeon: Rohith Crespo MD;  Location: [facility] OR;  Service: Ophthalmology;  Laterality: Right;    PHACOEMULSIFICATION, CATARACT, WITH IOL INSERTION Left 09/03/2024     Procedure: PHACOEMULSIFICATION, CATARACT, WITH IOL INSERTION- OS;  Surgeon: Rohith Crespo MD;  Location: [facility] OR;  Service: Ophthalmology;  Laterality: Left;    REPLACEMENT OF IMPLANTABLE CARDIOVERTER-DEFIBRILLATOR (ICD) BATTERY   07/2022    skin cancer removal Right       skin underneath the eye       Review of Systems   Cardiovascular:  Positive for palpitations.   Respiratory:  Positive for cough and shortness of breath.      Objective:     Vital Signs (Most Recent):  Temp: 98.5 °F (36.9 °C) (07/12/25 1215)  Pulse: 73 (07/12/25 1215)  Resp: 20 (07/12/25 0916)  BP: 127/73 (07/12/25 1215)  SpO2: 96 % (07/12/25 1215) Vital Signs  "(24h Range):  Temp:  [97.8 °F (36.6 °C)-98.5 °F (36.9 °C)] 98.5 °F (36.9 °C)  Pulse:  [] 73  Resp:  [17-21] 20  SpO2:  [92 %-97 %] 96 %  BP: (111-127)/(65-73) 127/73   Weight: 91.1 kg (200 lb 12.8 oz)  Body mass index is 29.65 kg/m².  SpO2: 96 %     No intake or output data in the 24 hours ending 07/12/25 1234  Lines/Drains/Airways       Peripheral Intravenous Line  Duration             Peripheral IV 07/11/25 20 G Right Antecubital 1 day                    Significant Labs:   Chemistries:   Recent Labs   Lab 07/10/25  1742 07/11/25  0056 07/11/25 0338 07/12/25  0604     --  140 141   K 3.7  --  4.1 4.1     --  103 102   CO2 30  --  25 29   BUN 12.5  --  13.1 22.0   CREATININE 0.74  --  0.69* 0.68*   CALCIUM 9.0  --  8.6* 8.6*   PROT 7.8*  --  7.3 6.9   BILITOT 1.3  --  1.5 1.0   ALKPHOS 73  --  71 62   ALT 16  --  14 16   AST 17  --  17 18   MG 1.80  --  1.80 2.20   TROPONINI 0.043 0.029  --   --         CBC/Anemia Labs: Coags:    Recent Labs   Lab 07/10/25  1742 07/11/25  0338 07/12/25  0604   WBC 10.74 8.34 15.07*   HGB 12.7* 12.2* 11.4*   HCT 40.3* 37.9* 34.9*    186 223   MCV 94.4* 91.3 92.1   RDW 14.6 14.9 15.3    No results for input(s): "PT", "INR", "APTT" in the last 168 hours.     Telemetry:  AF    Physical Exam  Vitals and nursing note reviewed.   HENT:      Head: Normocephalic.   Cardiovascular:      Rate and Rhythm: Tachycardia present. Rhythm irregular.   Pulmonary:      Breath sounds: Rhonchi and rales present.   Musculoskeletal:         General: Swelling present.      Right lower leg: Edema present.      Left lower leg: Edema present.   Neurological:      Mental Status: He is alert.         Current Schedule Inpatient Medications:   albuterol-ipratropium  3 mL Nebulization Q6H    amiodarone  200 mg Oral BID    amLODIPine  5 mg Oral Daily    aspirin  81 mg Oral Daily    atorvastatin  40 mg Oral QHS    azithromycin  500 mg Intravenous Q24H    cefTRIAXone (Rocephin) IV (PEDS and " ADULTS)  1 g Intravenous Q24H    clopidogreL  75 mg Oral Daily    dapagliflozin propanediol  10 mg Oral Daily    digoxin  250 mcg Intravenous Once    enoxparin  40 mg Subcutaneous Q24H (prophylaxis, 1700)    fluticasone furoate  1 puff Inhalation Daily    And    umeclidinium-vilanteroL  1 puff Inhalation Daily    furosemide (LASIX) injection  40 mg Intravenous Q12H    levothyroxine  50 mcg Oral Before breakfast    methylPREDNISolone injection (PEDS and ADULTS)  40 mg Intravenous Q8H    pantoprazole  40 mg Oral Daily     Continuous Infusions:      Assessment:   Acute/Chronic Systolic HF  Echo 6/4/25 EF 35-40%  S/p BiV AICD  Pneumonia  CT of the chest-infiltrate in the right lower lobe  NICMO  NML LHC 2017, PET 12/2023 no ischemia   PAF  S/p ablation/Watchman device  VT  HLD  Hypothyroidism      Plan:     Continue asa 81 mg po daily; increase amiodarone to 200 mg po BID, atorvastatin 40 mg po daily  No BB due to allergy  Entresto on hold due to hypotension per last clinic visit  Cont lasix 40 mg IV bid  Resume dapagliflozin 10 mg po daily  Strict I& O  Keep K>4 and Mag>2  Labs in am: CBC, CMP and Mag  Prn digoxin IV for rate control      Agus Montilla MD  Cardiology  [facility]

## 2025-07-13 LAB
ANION GAP SERPL CALC-SCNC: 9 MEQ/L
BACTERIA SPEC CULT: NORMAL
BASOPHILS # BLD AUTO: 0.02 X10(3)/MCL
BASOPHILS NFR BLD AUTO: 0.1 %
BUN SERPL-MCNC: 24.4 MG/DL (ref 8.4–25.7)
CALCIUM SERPL-MCNC: 8.5 MG/DL (ref 8.8–10)
CHLORIDE SERPL-SCNC: 100 MMOL/L (ref 98–107)
CO2 SERPL-SCNC: 31 MMOL/L (ref 23–31)
CREAT SERPL-MCNC: 0.73 MG/DL (ref 0.72–1.25)
CREAT/UREA NIT SERPL: 33
EOSINOPHIL # BLD AUTO: 0 X10(3)/MCL (ref 0–0.9)
EOSINOPHIL NFR BLD AUTO: 0 %
ERYTHROCYTE [DISTWIDTH] IN BLOOD BY AUTOMATED COUNT: 15.4 % (ref 11.5–17)
GFR SERPLBLD CREATININE-BSD FMLA CKD-EPI: >60 ML/MIN/1.73/M2
GLUCOSE SERPL-MCNC: 137 MG/DL (ref 82–115)
GRAM STN SPEC: NORMAL
HCT VFR BLD AUTO: 35.7 % (ref 42–52)
HGB BLD-MCNC: 11 G/DL (ref 14–18)
IMM GRANULOCYTES # BLD AUTO: 0.11 X10(3)/MCL (ref 0–0.04)
IMM GRANULOCYTES NFR BLD AUTO: 0.7 %
LYMPHOCYTES # BLD AUTO: 0.74 X10(3)/MCL (ref 0.6–4.6)
LYMPHOCYTES NFR BLD AUTO: 4.7 %
MAGNESIUM SERPL-MCNC: 2.3 MG/DL (ref 1.6–2.6)
MCH RBC QN AUTO: 29.1 PG (ref 27–31)
MCHC RBC AUTO-ENTMCNC: 30.8 G/DL (ref 33–36)
MCV RBC AUTO: 94.4 FL (ref 80–94)
MONOCYTES # BLD AUTO: 0.41 X10(3)/MCL (ref 0.1–1.3)
MONOCYTES NFR BLD AUTO: 2.6 %
NEUTROPHILS # BLD AUTO: 14.37 X10(3)/MCL (ref 2.1–9.2)
NEUTROPHILS NFR BLD AUTO: 91.9 %
NRBC BLD AUTO-RTO: 0.1 %
PLATELET # BLD AUTO: 226 X10(3)/MCL (ref 130–400)
PMV BLD AUTO: 9.7 FL (ref 7.4–10.4)
POTASSIUM SERPL-SCNC: 3.8 MMOL/L (ref 3.5–5.1)
RBC # BLD AUTO: 3.78 X10(6)/MCL (ref 4.7–6.1)
SODIUM SERPL-SCNC: 140 MMOL/L (ref 136–145)
WBC # BLD AUTO: 15.65 X10(3)/MCL (ref 4.5–11.5)

## 2025-07-13 PROCEDURE — 80048 BASIC METABOLIC PNL TOTAL CA: CPT

## 2025-07-13 PROCEDURE — 85025 COMPLETE CBC W/AUTO DIFF WBC: CPT

## 2025-07-13 PROCEDURE — 94640 AIRWAY INHALATION TREATMENT: CPT

## 2025-07-13 PROCEDURE — 63600175 PHARM REV CODE 636 W HCPCS: Mod: JZ,TB

## 2025-07-13 PROCEDURE — 94761 N-INVAS EAR/PLS OXIMETRY MLT: CPT

## 2025-07-13 PROCEDURE — 94760 N-INVAS EAR/PLS OXIMETRY 1: CPT

## 2025-07-13 PROCEDURE — 36415 COLL VENOUS BLD VENIPUNCTURE: CPT

## 2025-07-13 PROCEDURE — 27000221 HC OXYGEN, UP TO 24 HOURS

## 2025-07-13 PROCEDURE — 83735 ASSAY OF MAGNESIUM: CPT

## 2025-07-13 PROCEDURE — 99900035 HC TECH TIME PER 15 MIN (STAT)

## 2025-07-13 PROCEDURE — 25000003 PHARM REV CODE 250: Performed by: NURSE PRACTITIONER

## 2025-07-13 PROCEDURE — 21400001 HC TELEMETRY ROOM

## 2025-07-13 PROCEDURE — 25000242 PHARM REV CODE 250 ALT 637 W/ HCPCS

## 2025-07-13 PROCEDURE — 94664 DEMO&/EVAL PT USE INHALER: CPT

## 2025-07-13 PROCEDURE — 99900031 HC PATIENT EDUCATION (STAT)

## 2025-07-13 PROCEDURE — 25000003 PHARM REV CODE 250: Performed by: SPECIALIST

## 2025-07-13 PROCEDURE — 27000646 HC AEROBIKA DEVICE

## 2025-07-13 PROCEDURE — 63600175 PHARM REV CODE 636 W HCPCS: Performed by: NURSE PRACTITIONER

## 2025-07-13 PROCEDURE — 25000003 PHARM REV CODE 250

## 2025-07-13 RX ORDER — FUROSEMIDE 10 MG/ML
60 INJECTION INTRAMUSCULAR; INTRAVENOUS EVERY 12 HOURS
Status: DISCONTINUED | OUTPATIENT
Start: 2025-07-13 | End: 2025-07-14 | Stop reason: HOSPADM

## 2025-07-13 RX ADMIN — ENOXAPARIN SODIUM 40 MG: 40 INJECTION SUBCUTANEOUS at 05:07

## 2025-07-13 RX ADMIN — METHYLPREDNISOLONE SODIUM SUCCINATE 40 MG: 40 INJECTION, POWDER, FOR SOLUTION INTRAMUSCULAR; INTRAVENOUS at 05:07

## 2025-07-13 RX ADMIN — DAPAGLIFLOZIN 10 MG: 10 TABLET, FILM COATED ORAL at 11:07

## 2025-07-13 RX ADMIN — IPRATROPIUM BROMIDE AND ALBUTEROL SULFATE 3 ML: .5; 3 SOLUTION RESPIRATORY (INHALATION) at 12:07

## 2025-07-13 RX ADMIN — FUROSEMIDE 60 MG: 10 INJECTION, SOLUTION INTRAMUSCULAR; INTRAVENOUS at 09:07

## 2025-07-13 RX ADMIN — CEFTRIAXONE SODIUM 1 G: 1 INJECTION, POWDER, FOR SOLUTION INTRAMUSCULAR; INTRAVENOUS at 05:07

## 2025-07-13 RX ADMIN — IPRATROPIUM BROMIDE AND ALBUTEROL SULFATE 3 ML: .5; 3 SOLUTION RESPIRATORY (INHALATION) at 01:07

## 2025-07-13 RX ADMIN — IPRATROPIUM BROMIDE AND ALBUTEROL SULFATE 3 ML: .5; 3 SOLUTION RESPIRATORY (INHALATION) at 08:07

## 2025-07-13 RX ADMIN — PANTOPRAZOLE SODIUM 40 MG: 40 TABLET, DELAYED RELEASE ORAL at 11:07

## 2025-07-13 RX ADMIN — LEVOTHYROXINE SODIUM 50 MCG: 0.05 TABLET ORAL at 05:07

## 2025-07-13 RX ADMIN — AMLODIPINE BESYLATE 5 MG: 5 TABLET ORAL at 11:07

## 2025-07-13 RX ADMIN — IPRATROPIUM BROMIDE AND ALBUTEROL SULFATE 3 ML: .5; 3 SOLUTION RESPIRATORY (INHALATION) at 07:07

## 2025-07-13 RX ADMIN — UMECLIDINIUM BROMIDE AND VILANTEROL TRIFENATATE 1 PUFF: 62.5; 25 POWDER RESPIRATORY (INHALATION) at 11:07

## 2025-07-13 RX ADMIN — ASPIRIN 81 MG: 81 TABLET, DELAYED RELEASE ORAL at 11:07

## 2025-07-13 RX ADMIN — AMIODARONE HYDROCHLORIDE 200 MG: 200 TABLET ORAL at 11:07

## 2025-07-13 RX ADMIN — FLUTICASONE FUROATE 1 PUFF: 100 POWDER RESPIRATORY (INHALATION) at 11:07

## 2025-07-13 RX ADMIN — FUROSEMIDE 60 MG: 10 INJECTION, SOLUTION INTRAMUSCULAR; INTRAVENOUS at 11:07

## 2025-07-13 RX ADMIN — CLOPIDOGREL 75 MG: 75 TABLET ORAL at 11:07

## 2025-07-13 RX ADMIN — ATORVASTATIN CALCIUM 40 MG: 40 TABLET, FILM COATED ORAL at 08:07

## 2025-07-13 RX ADMIN — SENNOSIDES, DOCUSATE SODIUM 1 TABLET: 8.6; 5 TABLET ORAL at 05:07

## 2025-07-13 RX ADMIN — AMIODARONE HYDROCHLORIDE 200 MG: 200 TABLET ORAL at 08:07

## 2025-07-13 RX ADMIN — AZITHROMYCIN DIHYDRATE 500 MG: 500 INJECTION, POWDER, LYOPHILIZED, FOR SOLUTION INTRAVENOUS at 05:07

## 2025-07-13 NOTE — PROGRESS NOTES
[facility]    Cardiology  Progress Note    Patient Name: Babak Scales  MRN: [id number]  Admission Date: 7/11/2025  Hospital Length of Stay: 2 days  Code Status: Full Code   Attending Physician: Yehuda Chery,*   Primary Care Physician: Guillermo Brown Jr., MD  Expected Discharge Date:   Principal Problem:<principal problem not specified>    Subjective:   HPI:   79-year-old male with a history of nonischemic cardiomyopathy, biventricular AICD which is a [employer] device, atrial fibrillation status post watchman, positive calcium score and bladder cancer.  Last coronary angiogram was November 2017 showing no significant coronary disease except a 70% fourth diagonal stenosis.  PET scan December 2023 shows no evidence of ischemia.  Echocardiogram performed June 2025 shows a EF of 35-40.  He presents back to the hospital with ongoing shortness of breath and wheezing.  He states over the past several days its gotten worse he tries to lay flat in his bed cannot do it has to sit up on the side of the bed to breathe.  EKG done upon arrival shows atrial fibrillation with a moderate to rapid ventricular response.  There is no ischemic changes.  Chest x-ray shows some infiltrates.  CTA shows infiltrates that have been worsening.  Lab work shows negative troponin and a minimally evaded BNP.  He was given 40 mg of Lasix and is diuresing quite well. [facility] is being consulted for CHF.      7.12.25:  improving with diuresis and antibiotics  7.13.25:  steady improvement, walking in room, edema still present, continue IV lasix     PMH: Chronic systolic/diastolic HF, AF, BiV AICD, elevated coronary calcium score, bladder ca, GERD, HLD  PSH: A fib ablation, TIARA closure, cataract surgery  Family History: Father-Cancer, Mother-HTN, Brother-CAD  Social History: Ex smoker, Denied alcohol and drug use        Previous Cardiac Diagnostics:   Echocardiogram 6.4.25  Left Ventricle: The left ventricle is  mildly dilated. Normal wall thickness. Moderate global hypokinesis present. There is moderately reduced systolic function with a visually estimated ejection fraction of 35 - 40%.  Right Ventricle: The right ventricle is normal in size Systolic function is normal.  Mitral Valve: There is mild regurgitation.  Tricuspid Valve: There is mild regurgitation.  Limited view study.     CT of the chest 6.4.25  Scattered nodular opacities favored infection or inflammatory. Given the lower lobe nodule measures 9 mm-follow up CT in 3 months.     PET 12/27/23  Left Ventricle: The left ventricle is mildly dilated. Normal wall thickness. Moderate global hypokinesis present. There is moderately reduced systolic function with a visually estimated ejection fraction of 35 - 40%.  Right Ventricle: The right ventricle is normal in size Systolic function is normal.  Mitral Valve: There is mild regurgitation.  Tricuspid Valve: There is mild regurgitation.  Limited view study.     [facility] 11/10/17  LMCA: Calcified patent vessel, LAD: Calcified patent type II vessel that gives rise to a large patent D1 and D2 vessel. There were 3 subsequent small to medium diagonal branches. The 4th diagonal had an ostial 70% stenosis. Left Cx: nondominant patent vessel that contributed a single large OM, RCA-large calcified- LI, RCA large PDA and large YANIRA segment.          Past Medical History:   Diagnosis Date    Arthritis      Asthma      Atrial fibrillation       xarelto    Bladder cancer      Bladder disorder      CAD (coronary artery disease)      CHF (congestive heart failure)      COPD (chronic obstructive pulmonary disease)      Diverticulitis      Enlarged prostate      GERD (gastroesophageal reflux disease)      Heart damage      History of kidney stones      HLD (hyperlipidemia)      [place] (hard of hearing)      Insomnia      Obesity      Skin cancer      SOB (shortness of breath)      Stroke      Thyroid disease              Past Surgical History:    Procedure Laterality Date    ABLATION N/A 4/14/2025     Procedure: Ablation;  Surgeon: Jono Rutherford MD;  Location: [facility] CATH LAB;  Service: Cardiology;  Laterality: N/A;    BLADDER SURGERY        CERVICAL FUSION        CHOLECYSTECTOMY        CLOSURE OF LEFT ATRIAL APPENDAGE USING DEVICE N/A 4/14/2025     Procedure: Left atrial appendage closure device;  Surgeon: Jono Rutherford MD;  Location: [facility] CATH LAB;  Service: Cardiology;  Laterality: N/A;  WATCHMAN W/ KIRBY + AF CATH ABLATION    COLONOSCOPY        ECHOCARDIOGRAM,TRANSESOPHAGEAL N/A 4/14/2025     Procedure: Transesophageal echo (KIRBY) intra-procedure log documentation;  Surgeon: Provider, Dosc [facility];  Location: [facility] CATH LAB;  Service: Cardiology;  Laterality: N/A;    ESOPHAGOGASTRODUODENOSCOPY        EXCISION OF PTERYGIUM Left 06/25/2024     Procedure: EXCISION, PTERYGIUM WITH MMC AND CONJ AUTOGRAFT - OS;  Surgeon: Rohith Crespo MD;  Location: [facility] OR;  Service: Ophthalmology;  Laterality: Left;    EXCISIONAL HEMORRHOIDECTOMY        FUSION OF SACROILIAC JOINT        INSERTION OF PACEMAKER         2021    PHACOEMULSIFICATION, CATARACT, WITH IOL INSERTION Right 08/27/2024     Procedure: PHACOEMULSIFICATION, CATARACT, WITH IOL INSERTION- OD;  Surgeon: Rohith Crespo MD;  Location: [facility] OR;  Service: Ophthalmology;  Laterality: Right;    PHACOEMULSIFICATION, CATARACT, WITH IOL INSERTION Left 09/03/2024     Procedure: PHACOEMULSIFICATION, CATARACT, WITH IOL INSERTION- OS;  Surgeon: Rohith Crespo MD;  Location: [facility] OR;  Service: Ophthalmology;  Laterality: Left;    REPLACEMENT OF IMPLANTABLE CARDIOVERTER-DEFIBRILLATOR (ICD) BATTERY   07/2022    skin cancer removal Right       skin underneath the eye       Review of Systems   Constitutional: Positive for malaise/fatigue.   Cardiovascular:  Positive for palpitations.   Respiratory:  Positive for cough and shortness of breath.      Objective:     Vital Signs (Most Recent):  Temp: 98.4 °F (36.9 °C) (07/13/25  "0756)  Pulse: 87 (07/13/25 0756)  Resp: 18 (07/13/25 0756)  BP: 134/79 (07/13/25 0756)  SpO2: 95 % (07/13/25 0756) Vital Signs (24h Range):  Temp:  [97.3 °F (36.3 °C)-98.5 °F (36.9 °C)] 98.4 °F (36.9 °C)  Pulse:  [] 87  Resp:  [18-20] 18  SpO2:  [91 %-96 %] 95 %  BP: (121-140)/(65-81) 134/79   Weight: 91.1 kg (200 lb 12.8 oz)  Body mass index is 29.65 kg/m².  SpO2: 95 %       Intake/Output Summary (Last 24 hours) at 7/13/2025 1038  Last data filed at 7/13/2025 0529  Gross per 24 hour   Intake 300 ml   Output 600 ml   Net -300 ml     Lines/Drains/Airways       Peripheral Intravenous Line  Duration             Peripheral IV 07/11/25 20 G Right Antecubital 2 days                    Significant Labs:   Chemistries:   Recent Labs   Lab 07/10/25  1742 07/11/25  0056 07/11/25  0338 07/12/25  0604 07/13/25  0529     --  140 141 140   K 3.7  --  4.1 4.1 3.8     --  103 102 100   CO2 30  --  25 29 31   BUN 12.5  --  13.1 22.0 24.4   CREATININE 0.74  --  0.69* 0.68* 0.73   CALCIUM 9.0  --  8.6* 8.6* 8.5*   PROT 7.8*  --  7.3 6.9  --    BILITOT 1.3  --  1.5 1.0  --    ALKPHOS 73  --  71 62  --    ALT 16  --  14 16  --    AST 17  --  17 18  --    MG 1.80  --  1.80 2.20 2.30   TROPONINI 0.043 0.029  --   --   --         CBC/Anemia Labs: Coags:    Recent Labs   Lab 07/11/25  0338 07/12/25  0604 07/13/25  0529   WBC 8.34 15.07* 15.65*   HGB 12.2* 11.4* 11.0*   HCT 37.9* 34.9* 35.7*    223 226   MCV 91.3 92.1 94.4*   RDW 14.9 15.3 15.4    No results for input(s): "PT", "INR", "APTT" in the last 168 hours.     Telemetry:  AF    Physical Exam  Vitals and nursing note reviewed.   HENT:      Head: Normocephalic.   Cardiovascular:      Rate and Rhythm: Normal rate. Rhythm irregular.   Pulmonary:      Breath sounds: Rhonchi and rales present.   Musculoskeletal:         General: Swelling present.      Right lower leg: Edema present.      Left lower leg: Edema present.   Neurological:      Mental Status: He is " alert.         Current Schedule Inpatient Medications:   albuterol-ipratropium  3 mL Nebulization Q6H    amiodarone  200 mg Oral BID    amLODIPine  5 mg Oral Daily    aspirin  81 mg Oral Daily    atorvastatin  40 mg Oral QHS    azithromycin  500 mg Intravenous Q24H    cefTRIAXone (Rocephin) IV (PEDS and ADULTS)  1 g Intravenous Q24H    clopidogreL  75 mg Oral Daily    dapagliflozin propanediol  10 mg Oral Daily    enoxparin  40 mg Subcutaneous Q24H (prophylaxis, 1700)    fluticasone furoate  1 puff Inhalation Daily    And    umeclidinium-vilanteroL  1 puff Inhalation Daily    furosemide (LASIX) injection  60 mg Intravenous Q12H    levothyroxine  50 mcg Oral Before breakfast    methylPREDNISolone injection (PEDS and ADULTS)  40 mg Intravenous Q8H    pantoprazole  40 mg Oral Daily     Continuous Infusions:      Assessment:   Acute/Chronic Systolic HF  Echo 6/4/25 EF 35-40%  S/p BiV AICD  Pneumonia  CT of the chest-infiltrate in the right lower lobe  NICMO  NML LHC 2017, PET 12/2023 no ischemia   PAF back in AF  S/p ablation/Watchman device  VT  HLD  Hypothyroidism      Plan:     Continue asa 81 mg po daily; increase amiodarone to 200 mg po BID, atorvastatin 40 mg po daily  No BB due to allergy  Entresto on hold due to hypotension per last clinic visit  Cont lasix 40 mg IV bid  Resume dapagliflozin 10 mg po daily  Strict I& O  Keep K>4 and Mag>2  Labs in am: CBC, CMP and Mag  Prn digoxin IV for rate control      Agus Montilla MD  Cardiology  [facility]

## 2025-07-13 NOTE — PROGRESS NOTES
[facility] Hospital Medicine Progress Note        Chief Complaint: Inpatient Follow-up     HPI:   79 year old man with a PMHx of COPD (2L at home), NICM s/p AICD, Afib s/p watchman, BPH, bladder cancer, HTN, HLD, CAD and CVA presented to the hospital for SOB. Reports he was in [place] previously and was discharged and he presented to the ER over there and was transferred for ER services.     Seen in the ER with his wife at bedside. Reports wheezing and LE edema. Trop negative. CT chest with RLL infiltrate.     Started on IV diuresis and IV steroids.     Interval Hx:   Still has wheezing and LE edema. Otherwise no chest pain. No N/V. Complaining of no abdominal pain.    Case was discussed with patient's nurse and  on the floor.    Objective/physical exam:  General: In no acute distress, afebrile  Chest: mild wheezing bilaterally  Heart: RRR, +S1, S2, no appreciable murmur  Abdomen: Soft, nontender, BS +, has ventral hernia that is reducible  MSK: Warm, +2 lower extremity edema, no clubbing or cyanosis  Neurologic: Alert and oriented x4, Cranial nerve II-XII intact, Strength 5/5 in all 4 extremities    VITAL SIGNS: 24 HRS MIN & MAX LAST   Temp  Min: 97.3 °F (36.3 °C)  Max: 98.5 °F (36.9 °C) 98.4 °F (36.9 °C)   BP  Min: 121/65  Max: 140/71 134/79   Pulse  Min: 73  Max: 116  87   Resp  Min: 18  Max: 20 18   SpO2  Min: 91 %  Max: 96 % 95 %     I have reviewed the following labs:  Recent Labs   Lab 07/11/25  0338 07/12/25  0604 07/13/25  0529   WBC 8.34 15.07* 15.65*   RBC 4.15* 3.79* 3.78*   HGB 12.2* 11.4* 11.0*   HCT 37.9* 34.9* 35.7*   MCV 91.3 92.1 94.4*   MCH 29.4 30.1 29.1   MCHC 32.2* 32.7* 30.8*   RDW 14.9 15.3 15.4    223 226   MPV 8.9 9.6 9.7     Recent Labs   Lab 07/10/25  1742 07/11/25  0338 07/12/25  0604 07/13/25  0529    140 141 140   K 3.7 4.1 4.1 3.8    103 102 100   CO2 30 25 29 31   BUN 12.5 13.1 22.0 24.4   CREATININE 0.74 0.69* 0.68*  0.73    178* 148* 137*   CALCIUM 9.0 8.6* 8.6* 8.5*   MG 1.80 1.80 2.20 2.30   ALBUMIN 3.6 3.5 3.2*  --    PROT 7.8* 7.3 6.9  --    ALKPHOS 73 71 62  --    ALT 16 14 16  --    AST 17 17 18  --    BILITOT 1.3 1.5 1.0  --      Microbiology Results (last 7 days)       Procedure Component Value Units Date/Time    Respiratory Culture [[id number]] Collected: 07/11/25 1759    Order Status: Completed Specimen: Sputum Updated: 07/13/25 0823     Respiratory Culture Normal respiratory zuly     GRAM STAIN Quality 1+      Many Gram positive cocci      Moderate Gram Positive Rods      Rare Gram negative diplococci             See below for Radiology    Assessment/Plan:  # Acute decompensated systolic heart failure  # Acute COPD exacerbation  # RLL PNA  # Acute hypoxic respiratory failure 2/2 above  # Steroid induced leukocytosis  # Ventral hernia - reducible  # Hx of COPD (2L at home), NICM s/p AICD, Afib s/p watchman, BPH, bladder cancer, HTN, HLD, CAD and CVA     - increase IV lasix 60 mg BID  - decrease IV solumedrol 40 mg every 12 hours and standing duonebs  - PPI while on steroids  - follow up Bcx  - sputum culture  - MRSA nares negative  - continue azithromycin and ceftriaxone (day 3/5)  - aspirin, plavix, statin  - rest of cardiology medications resumed as appropriate  - cardiology consult  - continue home oxygen  - telemetry monitoring  - K >4 and Mg > 2  - walking test done with the patient on 7/12  - outpatient follow up with surgery for his reducible ventral hernia     Critical care note  Critical care diagnosis: Acute decompensated heart failure requiring IV lasix  Critical care time: 75 mins    VTE prophylaxis: lovenox    Patient condition:  Fair    Anticipated discharge and Disposition:         All diagnosis and differential diagnosis have been reviewed; assessment and plan has been documented; I have personally reviewed the labs and test results that are presently available; I have reviewed the patients  medication list; I have reviewed the consulting providers response and recommendations. I have reviewed or attempted to review medical records based upon their availability    All of the patient's questions have been  addressed and answered. Patient's is agreeable to the above stated plan. I will continue to monitor closely and make adjustments to medical management as needed.    _____________________________________________________________________    Malnutrition Status:  Nutrition consulted. Most recent weight and BMI monitored-     Measurements:  Wt Readings from Last 1 Encounters:   07/11/25 91.1 kg (200 lb 12.8 oz)   Body mass index is 29.65 kg/m².    Patient has been screened and assessed by RD.    Malnutrition Type:  Context:    Level:      Malnutrition Characteristic Summary:       Interventions/Recommendations (treatment strategy):        Scheduled Med:   albuterol-ipratropium  3 mL Nebulization Q6H    amiodarone  200 mg Oral BID    amLODIPine  5 mg Oral Daily    aspirin  81 mg Oral Daily    atorvastatin  40 mg Oral QHS    azithromycin  500 mg Intravenous Q24H    cefTRIAXone (Rocephin) IV (PEDS and ADULTS)  1 g Intravenous Q24H    clopidogreL  75 mg Oral Daily    dapagliflozin propanediol  10 mg Oral Daily    enoxparin  40 mg Subcutaneous Q24H (prophylaxis, 1700)    fluticasone furoate  1 puff Inhalation Daily    And    umeclidinium-vilanteroL  1 puff Inhalation Daily    furosemide (LASIX) injection  60 mg Intravenous Q12H    levothyroxine  50 mcg Oral Before breakfast    methylPREDNISolone injection (PEDS and ADULTS)  40 mg Intravenous Q8H    pantoprazole  40 mg Oral Daily      Continuous Infusions:     PRN Meds:    Current Facility-Administered Medications:     acetaminophen, 650 mg, Oral, Q6H PRN    albuterol-ipratropium, 3 mL, Nebulization, Q4H PRN    aluminum-magnesium hydroxide-simethicone, 30 mL, Oral, QID PRN    bisacodyL, 10 mg, Rectal, Daily PRN    dextrose 50%, 12.5 g, Intravenous, PRN     dextrose 50%, 25 g, Intravenous, PRN    glucagon (human recombinant), 1 mg, Intramuscular, PRN    glucose, 16 g, Oral, PRN    glucose, 24 g, Oral, PRN    melatonin, 6 mg, Oral, Nightly PRN    naloxone, 0.02 mg, Intravenous, PRN    ondansetron, 4 mg, Intravenous, Q4H PRN    prochlorperazine, 5 mg, Intravenous, Q6H PRN    senna-docusate, 1 tablet, Oral, BID PRN    sodium chloride 0.9%, 10 mL, Intravenous, PRN     Radiology:  I have personally reviewed the following imaging and agree with the radiologist.     CTA Chest Non-Coronary (PE Studies)  Narrative: EXAMINATION:  CTA CHEST NON CORONARY (PE STUDIES)    CLINICAL HISTORY:  Pulmonary embolism (PE) suspected, unknown D-dimer;    TECHNIQUE:  Low dose axial images, sagittal and coronal reformations were obtained from the thoracic inlet to the lung bases following the IV administration of contrast..  Contrast timing was optimized to evaluate the pulmonary arteries.  MIP images were performed.  Automated exposure control was utilized    COMPARISON:  06/04/2024 chest CT    FINDINGS:  There are chronic interstitial lung changes seen bilaterally and some changes consistent with COPD in the lungs.  There is right lower lobe atelectasis and a reticulonodular infiltrate in the right lower lobe and in the right middle lobe.  This has shown mild progression since the prior examination.  Thoracic aorta is normal in caliber.    The heart is enlarged in size.    There is some mediastinal lymphadenopathy seen.  The largest lymph node is seen in the precarinal region and has a short axis dimension of 1.1 cm.  These are slightly more prominent than the prior examination and may represent reactive lymphadenopathy.  There is also an enlarged lymph node in the subcarinal region measured on image 66 series 3 that measures 2.2 x 1.4 cm.  This is slightly more prominent than the prior examination.    No pulmonary embolism is seen.  Impression: No pulmonary embolism seen    Reticulonodular  interstitial infiltrate in the right lower lobe and right middle lobe appears slightly worse than the prior examination    Right lower lobe atelectasis and small effusion slightly worse than prior examination    Likely reactive mediastinal lymphadenopathy slightly more prominent than the prior examination    Electronically signed by: Abhijeet Hamilton  Date:    07/10/2025  Time:    19:04  X-Ray Chest AP Portable  Narrative: EXAMINATION:  XR CHEST AP PORTABLE    CLINICAL HISTORY:  Chest pain, unspecified    TECHNIQUE:  One view    COMPARISON:  Rachel 3, 2025.    FINDINGS:  Cardiopericardial silhouette enlarged appearance similar.  Left chest implanted cardiac device electrodes terminate within the right atrium and the right ventricle.  There is some chronic dilatation of central pulmonary arteries without overt interstitial and alveolar pulmonary edema.  No focally dense consolidation or significant fluid within the pleural spaces.  No pneumothorax.  Impression: No acute cardiopulmonary process identified.    Electronically signed by: Raul Cordero  Date:    07/10/2025  Time:    17:52      Yehuda Chery MD  Department of Hospital Medicine   [facility]   07/13/2025

## 2025-07-14 VITALS
SYSTOLIC BLOOD PRESSURE: 132 MMHG | TEMPERATURE: 98 F | HEIGHT: 69 IN | WEIGHT: 200.81 LBS | OXYGEN SATURATION: 94 % | RESPIRATION RATE: 22 BRPM | HEART RATE: 112 BPM | DIASTOLIC BLOOD PRESSURE: 77 MMHG | BODY MASS INDEX: 29.74 KG/M2

## 2025-07-14 PROBLEM — I50.9 ACUTE EXACERBATION OF CHF (CONGESTIVE HEART FAILURE): Status: ACTIVE | Noted: 2025-07-14

## 2025-07-14 LAB
ALBUMIN SERPL-MCNC: 3.4 G/DL (ref 3.4–4.8)
ALBUMIN/GLOB SERPL: 1 RATIO (ref 1.1–2)
ALP SERPL-CCNC: 61 UNIT/L (ref 40–150)
ALT SERPL-CCNC: 22 UNIT/L (ref 0–55)
ANION GAP SERPL CALC-SCNC: 10 MEQ/L
AST SERPL-CCNC: 22 UNIT/L (ref 11–45)
BASOPHILS # BLD AUTO: 0.01 X10(3)/MCL
BASOPHILS NFR BLD AUTO: 0.1 %
BILIRUB SERPL-MCNC: 0.9 MG/DL
BUN SERPL-MCNC: 26.5 MG/DL (ref 8.4–25.7)
CALCIUM SERPL-MCNC: 8.7 MG/DL (ref 8.8–10)
CHLORIDE SERPL-SCNC: 100 MMOL/L (ref 98–107)
CO2 SERPL-SCNC: 32 MMOL/L (ref 23–31)
CREAT SERPL-MCNC: 0.72 MG/DL (ref 0.72–1.25)
CREAT/UREA NIT SERPL: 37
EOSINOPHIL # BLD AUTO: 0.01 X10(3)/MCL (ref 0–0.9)
EOSINOPHIL NFR BLD AUTO: 0.1 %
ERYTHROCYTE [DISTWIDTH] IN BLOOD BY AUTOMATED COUNT: 15.2 % (ref 11.5–17)
GFR SERPLBLD CREATININE-BSD FMLA CKD-EPI: >60 ML/MIN/1.73/M2
GLOBULIN SER-MCNC: 3.5 GM/DL (ref 2.4–3.5)
GLUCOSE SERPL-MCNC: 132 MG/DL (ref 82–115)
HCT VFR BLD AUTO: 36.5 % (ref 42–52)
HGB BLD-MCNC: 11.4 G/DL (ref 14–18)
IMM GRANULOCYTES # BLD AUTO: 0.07 X10(3)/MCL (ref 0–0.04)
IMM GRANULOCYTES NFR BLD AUTO: 0.5 %
LYMPHOCYTES # BLD AUTO: 0.95 X10(3)/MCL (ref 0.6–4.6)
LYMPHOCYTES NFR BLD AUTO: 7.1 %
MAGNESIUM SERPL-MCNC: 2.4 MG/DL (ref 1.6–2.6)
MCH RBC QN AUTO: 29.7 PG (ref 27–31)
MCHC RBC AUTO-ENTMCNC: 31.2 G/DL (ref 33–36)
MCV RBC AUTO: 95.1 FL (ref 80–94)
MONOCYTES # BLD AUTO: 0.74 X10(3)/MCL (ref 0.1–1.3)
MONOCYTES NFR BLD AUTO: 5.5 %
NEUTROPHILS # BLD AUTO: 11.68 X10(3)/MCL (ref 2.1–9.2)
NEUTROPHILS NFR BLD AUTO: 86.7 %
NRBC BLD AUTO-RTO: 0 %
OHS QRS DURATION: 156 MS
OHS QTC CALCULATION: 456 MS
PLATELET # BLD AUTO: 214 X10(3)/MCL (ref 130–400)
PMV BLD AUTO: 9.6 FL (ref 7.4–10.4)
POTASSIUM SERPL-SCNC: 4 MMOL/L (ref 3.5–5.1)
PROT SERPL-MCNC: 6.9 GM/DL (ref 5.8–7.6)
RBC # BLD AUTO: 3.84 X10(6)/MCL (ref 4.7–6.1)
SODIUM SERPL-SCNC: 142 MMOL/L (ref 136–145)
WBC # BLD AUTO: 13.46 X10(3)/MCL (ref 4.5–11.5)

## 2025-07-14 PROCEDURE — 99900031 HC PATIENT EDUCATION (STAT)

## 2025-07-14 PROCEDURE — 27000221 HC OXYGEN, UP TO 24 HOURS

## 2025-07-14 PROCEDURE — 85025 COMPLETE CBC W/AUTO DIFF WBC: CPT

## 2025-07-14 PROCEDURE — 36415 COLL VENOUS BLD VENIPUNCTURE: CPT

## 2025-07-14 PROCEDURE — 63600175 PHARM REV CODE 636 W HCPCS

## 2025-07-14 PROCEDURE — 63600175 PHARM REV CODE 636 W HCPCS: Performed by: NURSE PRACTITIONER

## 2025-07-14 PROCEDURE — 94760 N-INVAS EAR/PLS OXIMETRY 1: CPT

## 2025-07-14 PROCEDURE — 25000003 PHARM REV CODE 250

## 2025-07-14 PROCEDURE — 25000242 PHARM REV CODE 250 ALT 637 W/ HCPCS

## 2025-07-14 PROCEDURE — 83735 ASSAY OF MAGNESIUM: CPT

## 2025-07-14 PROCEDURE — 80053 COMPREHEN METABOLIC PANEL: CPT | Performed by: NURSE PRACTITIONER

## 2025-07-14 PROCEDURE — 94640 AIRWAY INHALATION TREATMENT: CPT

## 2025-07-14 PROCEDURE — 25000003 PHARM REV CODE 250: Performed by: NURSE PRACTITIONER

## 2025-07-14 PROCEDURE — 94664 DEMO&/EVAL PT USE INHALER: CPT

## 2025-07-14 PROCEDURE — 99900035 HC TECH TIME PER 15 MIN (STAT)

## 2025-07-14 PROCEDURE — 25000003 PHARM REV CODE 250: Performed by: SPECIALIST

## 2025-07-14 RX ORDER — PREDNISONE 20 MG/1
40 TABLET ORAL DAILY
Qty: 10 TABLET | Refills: 0 | Status: SHIPPED | OUTPATIENT
Start: 2025-07-14 | End: 2025-07-19

## 2025-07-14 RX ORDER — PANTOPRAZOLE SODIUM 40 MG/1
40 TABLET, DELAYED RELEASE ORAL DAILY
Qty: 30 TABLET | Refills: 0 | Status: SHIPPED | OUTPATIENT
Start: 2025-07-14 | End: 2025-08-13

## 2025-07-14 RX ORDER — FUROSEMIDE 40 MG/1
40 TABLET ORAL DAILY
Qty: 30 TABLET | Refills: 0 | Status: SHIPPED | OUTPATIENT
Start: 2025-07-14 | End: 2025-08-13

## 2025-07-14 RX ORDER — AZITHROMYCIN 500 MG/1
500 TABLET, FILM COATED ORAL DAILY
Qty: 2 TABLET | Refills: 0 | Status: SHIPPED | OUTPATIENT
Start: 2025-07-14 | End: 2025-07-16

## 2025-07-14 RX ORDER — CEFPODOXIME PROXETIL 100 MG/1
100 TABLET, FILM COATED ORAL 2 TIMES DAILY
Qty: 4 TABLET | Refills: 0 | Status: SHIPPED | OUTPATIENT
Start: 2025-07-14 | End: 2025-07-16

## 2025-07-14 RX ORDER — AMIODARONE HYDROCHLORIDE 200 MG/1
200 TABLET ORAL DAILY
Status: DISCONTINUED | OUTPATIENT
Start: 2025-07-15 | End: 2025-07-14 | Stop reason: HOSPADM

## 2025-07-14 RX ADMIN — AMIODARONE HYDROCHLORIDE 200 MG: 200 TABLET ORAL at 09:07

## 2025-07-14 RX ADMIN — DAPAGLIFLOZIN 10 MG: 10 TABLET, FILM COATED ORAL at 09:07

## 2025-07-14 RX ADMIN — CLOPIDOGREL 75 MG: 75 TABLET ORAL at 09:07

## 2025-07-14 RX ADMIN — ALUMINUM HYDROXIDE, MAGNESIUM HYDROXIDE, AND DIMETHICONE 30 ML: 200; 20; 200 SUSPENSION ORAL at 03:07

## 2025-07-14 RX ADMIN — ASPIRIN 81 MG: 81 TABLET, DELAYED RELEASE ORAL at 09:07

## 2025-07-14 RX ADMIN — PANTOPRAZOLE SODIUM 40 MG: 40 TABLET, DELAYED RELEASE ORAL at 09:07

## 2025-07-14 RX ADMIN — LEVOTHYROXINE SODIUM 50 MCG: 0.05 TABLET ORAL at 05:07

## 2025-07-14 RX ADMIN — UMECLIDINIUM BROMIDE AND VILANTEROL TRIFENATATE 1 PUFF: 62.5; 25 POWDER RESPIRATORY (INHALATION) at 09:07

## 2025-07-14 RX ADMIN — METHYLPREDNISOLONE SODIUM SUCCINATE 40 MG: 40 INJECTION, POWDER, FOR SOLUTION INTRAMUSCULAR; INTRAVENOUS at 05:07

## 2025-07-14 RX ADMIN — CEFTRIAXONE SODIUM 1 G: 1 INJECTION, POWDER, FOR SOLUTION INTRAMUSCULAR; INTRAVENOUS at 05:07

## 2025-07-14 RX ADMIN — FLUTICASONE FUROATE 1 PUFF: 100 POWDER RESPIRATORY (INHALATION) at 09:07

## 2025-07-14 RX ADMIN — IPRATROPIUM BROMIDE AND ALBUTEROL SULFATE 3 ML: .5; 3 SOLUTION RESPIRATORY (INHALATION) at 01:07

## 2025-07-14 RX ADMIN — AZITHROMYCIN DIHYDRATE 500 MG: 500 INJECTION, POWDER, LYOPHILIZED, FOR SOLUTION INTRAVENOUS at 03:07

## 2025-07-14 RX ADMIN — IPRATROPIUM BROMIDE AND ALBUTEROL SULFATE 3 ML: .5; 3 SOLUTION RESPIRATORY (INHALATION) at 07:07

## 2025-07-14 RX ADMIN — AMLODIPINE BESYLATE 5 MG: 5 TABLET ORAL at 09:07

## 2025-07-14 RX ADMIN — FUROSEMIDE 60 MG: 10 INJECTION, SOLUTION INTRAMUSCULAR; INTRAVENOUS at 09:07

## 2025-07-14 NOTE — NURSING
AVS virtually reviewed with patient and wife in its entirety with emphasis on diet, medications, follow-up appointments and reasons to return to the ED. Patient and wife also encouraged to utilize their patient portal. Ease and convenience of use reiterated. Education complete and patient and wife voiced understanding. All questions answered. Discharge teaching complete.

## 2025-07-14 NOTE — PROGRESS NOTES
[facility]    Cardiology  Progress Note    Patient Name: Babak Scales  MRN: [id number]  Admission Date: 7/11/2025  Hospital Length of Stay: 3 days  Code Status: Full Code   Attending Physician: Yehuda Chery,*   Primary Care Physician: Guillermo Brown Jr., MD  Expected Discharge Date: 7/14/2025  Principal Problem:Acute exacerbation of CHF (congestive heart failure)    Subjective:   HPI:   79-year-old male with a history of nonischemic cardiomyopathy, biventricular AICD which is a [employer] device, atrial fibrillation status post watchman, positive calcium score and bladder cancer.  Last coronary angiogram was November 2017 showing no significant coronary disease except a 70% fourth diagonal stenosis.  PET scan December 2023 shows no evidence of ischemia.  Echocardiogram performed June 2025 shows a EF of 35-40.  He presents back to the hospital with ongoing shortness of breath and wheezing.  He states over the past several days its gotten worse he tries to lay flat in his bed cannot do it has to sit up on the side of the bed to breathe.  EKG done upon arrival shows atrial fibrillation with a moderate to rapid ventricular response.  There is no ischemic changes.  Chest x-ray shows some infiltrates.  CTA shows infiltrates that have been worsening.  Lab work shows negative troponin and a minimally evaded BNP.  He was given 40 mg of Lasix and is diuresing quite well. [facility] is being consulted for CHF.      7.12.25:  improving with diuresis and antibiotics  7.13.25:  steady improvement, walking in room, edema still present, continue IV lasix   7.14.25:  better, plans for discharge today on higher dose of oral diuretic, antibiotics  PMH: Chronic systolic/diastolic HF, AF, BiV AICD, elevated coronary calcium score, bladder ca, GERD, HLD  PSH: A fib ablation, TIARA closure, cataract surgery  Family History: Father-Cancer, Mother-HTN, Brother-CAD  Social History: Ex smoker, Denied  alcohol and drug use        Previous Cardiac Diagnostics:   Echocardiogram 6.4.25  Left Ventricle: The left ventricle is mildly dilated. Normal wall thickness. Moderate global hypokinesis present. There is moderately reduced systolic function with a visually estimated ejection fraction of 35 - 40%.  Right Ventricle: The right ventricle is normal in size Systolic function is normal.  Mitral Valve: There is mild regurgitation.  Tricuspid Valve: There is mild regurgitation.  Limited view study.     CT of the chest 6.4.25  Scattered nodular opacities favored infection or inflammatory. Given the lower lobe nodule measures 9 mm-follow up CT in 3 months.     PET 12/27/23  Left Ventricle: The left ventricle is mildly dilated. Normal wall thickness. Moderate global hypokinesis present. There is moderately reduced systolic function with a visually estimated ejection fraction of 35 - 40%.  Right Ventricle: The right ventricle is normal in size Systolic function is normal.  Mitral Valve: There is mild regurgitation.  Tricuspid Valve: There is mild regurgitation.  Limited view study.     [facility] 11/10/17  LMCA: Calcified patent vessel, LAD: Calcified patent type II vessel that gives rise to a large patent D1 and D2 vessel. There were 3 subsequent small to medium diagonal branches. The 4th diagonal had an ostial 70% stenosis. Left Cx: nondominant patent vessel that contributed a single large OM, RCA-large calcified- LI, RCA large PDA and large YANIRA segment.          Past Medical History:   Diagnosis Date    Arthritis      Asthma      Atrial fibrillation       xarelto    Bladder cancer      Bladder disorder      CAD (coronary artery disease)      CHF (congestive heart failure)      COPD (chronic obstructive pulmonary disease)      Diverticulitis      Enlarged prostate      GERD (gastroesophageal reflux disease)      Heart damage      History of kidney stones      HLD (hyperlipidemia)      [place] (hard of hearing)      Insomnia       Obesity      Skin cancer      SOB (shortness of breath)      Stroke      Thyroid disease              Past Surgical History:   Procedure Laterality Date    ABLATION N/A 4/14/2025     Procedure: Ablation;  Surgeon: Jono Rutherford MD;  Location: [facility] CATH LAB;  Service: Cardiology;  Laterality: N/A;    BLADDER SURGERY        CERVICAL FUSION        CHOLECYSTECTOMY        CLOSURE OF LEFT ATRIAL APPENDAGE USING DEVICE N/A 4/14/2025     Procedure: Left atrial appendage closure device;  Surgeon: Jono Rutherford MD;  Location: [facility] CATH LAB;  Service: Cardiology;  Laterality: N/A;  WATCHMAN W/ KIRBY + AF CATH ABLATION    COLONOSCOPY        ECHOCARDIOGRAM,TRANSESOPHAGEAL N/A 4/14/2025     Procedure: Transesophageal echo (KIRBY) intra-procedure log documentation;  Surgeon: Provider, Dosc Diagnostic;  Location: [facility] CATH LAB;  Service: Cardiology;  Laterality: N/A;    ESOPHAGOGASTRODUODENOSCOPY        EXCISION OF PTERYGIUM Left 06/25/2024     Procedure: EXCISION, PTERYGIUM WITH MMC AND CONJ AUTOGRAFT - OS;  Surgeon: Rohith Crespo MD;  Location: [facility] OR;  Service: Ophthalmology;  Laterality: Left;    EXCISIONAL HEMORRHOIDECTOMY        FUSION OF SACROILIAC JOINT        INSERTION OF PACEMAKER         2021    PHACOEMULSIFICATION, CATARACT, WITH IOL INSERTION Right 08/27/2024     Procedure: PHACOEMULSIFICATION, CATARACT, WITH IOL INSERTION- OD;  Surgeon: Rohith Crespo MD;  Location: [facility] OR;  Service: Ophthalmology;  Laterality: Right;    PHACOEMULSIFICATION, CATARACT, WITH IOL INSERTION Left 09/03/2024     Procedure: PHACOEMULSIFICATION, CATARACT, WITH IOL INSERTION- OS;  Surgeon: Rohith Crespo MD;  Location: [facility] OR;  Service: Ophthalmology;  Laterality: Left;    REPLACEMENT OF IMPLANTABLE CARDIOVERTER-DEFIBRILLATOR (ICD) BATTERY   07/2022    skin cancer removal Right       skin underneath the eye       Review of Systems   Constitutional: Negative for malaise/fatigue.   Cardiovascular:  Positive for leg swelling. Negative for  "palpitations.   Respiratory:  Negative for cough and shortness of breath.      Objective:     Vital Signs (Most Recent):  Temp: 98 °F (36.7 °C) (07/14/25 1034)  Pulse: 91 (07/14/25 1034)  Resp: 19 (07/14/25 0924)  BP: 132/77 (07/14/25 1034)  SpO2: (!) 94 % (07/14/25 1034) Vital Signs (24h Range):  Temp:  [97.5 °F (36.4 °C)-98.2 °F (36.8 °C)] 98 °F (36.7 °C)  Pulse:  [] 91  Resp:  [18-20] 19  SpO2:  [92 %-97 %] 94 %  BP: (105-139)/(63-77) 132/77   Weight: 91.1 kg (200 lb 12.8 oz)  Body mass index is 29.65 kg/m².  SpO2: (!) 94 %       Intake/Output Summary (Last 24 hours) at 7/14/2025 1143  Last data filed at 7/13/2025 2234  Gross per 24 hour   Intake 720 ml   Output 2300 ml   Net -1580 ml     Lines/Drains/Airways       Peripheral Intravenous Line  Duration             Peripheral IV 07/11/25 20 G Right Antecubital 3 days                    Significant Labs:   Chemistries:   Recent Labs   Lab 07/10/25  1742 07/11/25  0056 07/11/25  0338 07/12/25  0604 07/13/25  0529 07/14/25  0540     --  140 141 140 142   K 3.7  --  4.1 4.1 3.8 4.0     --  103 102 100 100   CO2 30  --  25 29 31 32*   BUN 12.5  --  13.1 22.0 24.4 26.5*   CREATININE 0.74  --  0.69* 0.68* 0.73 0.72   CALCIUM 9.0  --  8.6* 8.6* 8.5* 8.7*   PROT 7.8*  --  7.3 6.9  --  6.9   BILITOT 1.3  --  1.5 1.0  --  0.9   ALKPHOS 73  --  71 62  --  61   ALT 16  --  14 16  --  22   AST 17  --  17 18  --  22   MG 1.80  --  1.80 2.20 2.30 2.40   TROPONINI 0.043 0.029  --   --   --   --         CBC/Anemia Labs: Coags:    Recent Labs   Lab 07/12/25  0604 07/13/25  0529 07/14/25  0540   WBC 15.07* 15.65* 13.46*   HGB 11.4* 11.0* 11.4*   HCT 34.9* 35.7* 36.5*    226 214   MCV 92.1 94.4* 95.1*   RDW 15.3 15.4 15.2    No results for input(s): "PT", "INR", "APTT" in the last 168 hours.     Telemetry:  AF    Physical Exam  Vitals and nursing note reviewed.   HENT:      Head: Normocephalic.   Cardiovascular:      Rate and Rhythm: Normal rate. Rhythm " irregular.   Pulmonary:      Breath sounds: No rhonchi or rales.   Musculoskeletal:         General: Swelling present.      Right lower leg: No edema.      Left lower leg: No edema.   Neurological:      Mental Status: He is alert.         Current Schedule Inpatient Medications:   albuterol-ipratropium  3 mL Nebulization Q6H    amiodarone  200 mg Oral BID    amLODIPine  5 mg Oral Daily    aspirin  81 mg Oral Daily    atorvastatin  40 mg Oral QHS    azithromycin  500 mg Intravenous Q24H    cefTRIAXone (Rocephin) IV (PEDS and ADULTS)  1 g Intravenous Q24H    clopidogreL  75 mg Oral Daily    dapagliflozin propanediol  10 mg Oral Daily    enoxparin  40 mg Subcutaneous Q24H (prophylaxis, 1700)    fluticasone furoate  1 puff Inhalation Daily    And    umeclidinium-vilanteroL  1 puff Inhalation Daily    furosemide (LASIX) injection  60 mg Intravenous Q12H    levothyroxine  50 mcg Oral Before breakfast    methylPREDNISolone injection (PEDS and ADULTS)  40 mg Intravenous Q12H    pantoprazole  40 mg Oral Daily     Continuous Infusions:      Assessment:   Acute/Chronic Systolic HF  Echo 6/4/25 EF 35-40%  S/p BiV AICD  Pneumonia  CT of the chest-infiltrate in the right lower lobe  NICMO  NML LHC 2017, PET 12/2023 no ischemia   PAF back in AF  S/p ablation/Watchman device  VT  HLD  Hypothyroidism      Plan:     Continue asa 81 mg po daily; increase amiodarone to 200 mg po BID, atorvastatin 40 mg po daily  No BB due to allergy  Entresto on hold due to hypotension per last clinic visit  Begin higher dose oral diuretic  Resume dapagliflozin 10 mg po daily  Keep K>4 and Mag>2  OK to discharge of Lasix 40 mg 1-2x qd          Agus Montilla MD  Cardiology  [facility]

## 2025-07-14 NOTE — DISCHARGE SUMMARY
[facility] Medicine Discharge Summary    Admit Date: 7/11/2025  Discharge Date and Time: 7/14/202511:54 AM  Admitting Physician:  Team  Discharging Physician: Yehuda Chery MD.  Primary Care Physician: Guillermo Brown Jr., MD  Consults: Cardiology    Discharge Diagnoses:  # Acute decompensated systolic heart failure - resolved  # Acute COPD exacerbation - resolved  # RLL PNA  # Acute hypoxic respiratory failure 2/2 above - back to baseline  # Steroid induced leukocytosis  # Ventral hernia - reducible  # GERD  # Hx of COPD (2L at home), NICM s/p AICD, Afib s/p watchman, BPH, bladder cancer, HTN, HLD, CAD and CVA    Hospital Course:   79 year old man with a PMHx of COPD (2L at home), NICM s/p AICD, Afib s/p watchman, BPH, bladder cancer, HTN, HLD, CAD and CVA presented to the hospital for SOB. Reports he was in [place] and was discharged and he presented to the ER over there and was transferred for ER services.     Seen in the ER with his wife at bedside. Reports wheezing and LE edema. Trop negative. CT chest with RLL infiltrate.      Started on IV diuresis and IV steroids. He was also treated for community acquired pneumonia. He diuresed well and his wheezing resolved. He follows with [facility] urology for his bladder cancer which he needs to follow up on discharge.     Pt was seen and examined on the day of discharge. Stable to be discharged with PCP, cardiology, pulm and urology follow up. Also asked him to ask his PCP for surgery referral for hernia.     Vitals:  VITAL SIGNS: 24 HRS MIN & MAX LAST   Temp  Min: 97.5 °F (36.4 °C)  Max: 98.2 °F (36.8 °C) 98 °F (36.7 °C)   BP  Min: 105/69  Max: 132/77 132/77   Pulse  Min: 85  Max: 100  91   Resp  Min: 18  Max: 20 19   SpO2  Min: 93 %  Max: 97 % (!) 94 %       Physical Exam:  General: In no acute distress, afebrile  Chest: CTABL  Heart: RRR, +S1, S2, no appreciable murmur  Abdomen: Soft, nontender, BS  +, has ventral hernia that is reducible  MSK: Warm, +2 lower extremity edema, no clubbing or cyanosis  Neurologic: Alert and oriented x4, Cranial nerve II-XII intact, Strength 5/5 in all 4 extremities    Procedures Performed: No admission procedures for hospital encounter.     Significant Diagnostic Studies: See Full reports for all details    Recent Labs   Lab 07/12/25  0604 07/13/25  0529 07/14/25  0540   WBC 15.07* 15.65* 13.46*   RBC 3.79* 3.78* 3.84*   HGB 11.4* 11.0* 11.4*   HCT 34.9* 35.7* 36.5*   MCV 92.1 94.4* 95.1*   MCH 30.1 29.1 29.7   MCHC 32.7* 30.8* 31.2*   RDW 15.3 15.4 15.2    226 214   MPV 9.6 9.7 9.6       Recent Labs   Lab 07/11/25  0338 07/12/25  0604 07/13/25  0529 07/14/25  0540    141 140 142   K 4.1 4.1 3.8 4.0    102 100 100   CO2 25 29 31 32*   BUN 13.1 22.0 24.4 26.5*   CREATININE 0.69* 0.68* 0.73 0.72   * 148* 137* 132*   CALCIUM 8.6* 8.6* 8.5* 8.7*   MG 1.80 2.20 2.30 2.40   ALBUMIN 3.5 3.2*  --  3.4   PROT 7.3 6.9  --  6.9   ALKPHOS 71 62  --  61   ALT 14 16  --  22   AST 17 18  --  22   BILITOT 1.5 1.0  --  0.9        Microbiology Results (last 7 days)       Procedure Component Value Units Date/Time    Respiratory Culture [[id number]] Collected: 07/11/25 1759    Order Status: Completed Specimen: Sputum Updated: 07/13/25 0811     Respiratory Culture Normal respiratory zuly     GRAM STAIN Quality 1+      Many Gram positive cocci      Moderate Gram Positive Rods      Rare Gram negative diplococci             CTA Chest Non-Coronary (PE Studies)  Narrative: EXAMINATION:  CTA CHEST NON CORONARY (PE STUDIES)    CLINICAL HISTORY:  Pulmonary embolism (PE) suspected, unknown D-dimer;    TECHNIQUE:  Low dose axial images, sagittal and coronal reformations were obtained from the thoracic inlet to the lung bases following the IV administration of contrast..  Contrast timing was optimized to evaluate the pulmonary arteries.  MIP images were performed.  Automated exposure  control was utilized    COMPARISON:  06/04/2024 chest CT    FINDINGS:  There are chronic interstitial lung changes seen bilaterally and some changes consistent with COPD in the lungs.  There is right lower lobe atelectasis and a reticulonodular infiltrate in the right lower lobe and in the right middle lobe.  This has shown mild progression since the prior examination.  Thoracic aorta is normal in caliber.    The heart is enlarged in size.    There is some mediastinal lymphadenopathy seen.  The largest lymph node is seen in the precarinal region and has a short axis dimension of 1.1 cm.  These are slightly more prominent than the prior examination and may represent reactive lymphadenopathy.  There is also an enlarged lymph node in the subcarinal region measured on image 66 series 3 that measures 2.2 x 1.4 cm.  This is slightly more prominent than the prior examination.    No pulmonary embolism is seen.  Impression: No pulmonary embolism seen    Reticulonodular interstitial infiltrate in the right lower lobe and right middle lobe appears slightly worse than the prior examination    Right lower lobe atelectasis and small effusion slightly worse than prior examination    Likely reactive mediastinal lymphadenopathy slightly more prominent than the prior examination    Electronically signed by: Abhijeet Hamilton  Date:    07/10/2025  Time:    19:04  X-Ray Chest AP Portable  Narrative: EXAMINATION:  XR CHEST AP PORTABLE    CLINICAL HISTORY:  Chest pain, unspecified    TECHNIQUE:  One view    COMPARISON:  Rachel 3, 2025.    FINDINGS:  Cardiopericardial silhouette enlarged appearance similar.  Left chest implanted cardiac device electrodes terminate within the right atrium and the right ventricle.  There is some chronic dilatation of central pulmonary arteries without overt interstitial and alveolar pulmonary edema.  No focally dense consolidation or significant fluid within the pleural spaces.  No pneumothorax.  Impression: No  acute cardiopulmonary process identified.    Electronically signed by: Raul Cordero  Date:    07/10/2025  Time:    17:52         Medication List        PAUSE taking these medications      ENTRESTO 24-26 mg per tablet  Wait to take this until your doctor or other care provider tells you to start again.  Generic drug: sacubitriL-valsartan  Take 1 tablet by mouth 2 (two) times daily.            START taking these medications      azithromycin 500 MG tablet  Commonly known as: ZITHROMAX  Take 1 tablet (500 mg total) by mouth once daily. for 2 days     cefpodoxime 100 MG tablet  Commonly known as: VANTIN  Take 1 tablet (100 mg total) by mouth 2 (two) times daily. for 2 days     predniSONE 20 MG tablet  Commonly known as: DELTASONE  Take 2 tablets (40 mg total) by mouth once daily. for 5 days            CHANGE how you take these medications      furosemide 40 MG tablet  Commonly known as: LASIX  Take 1 tablet (40 mg total) by mouth once daily.  What changed:   medication strength  how much to take  when to take this            CONTINUE taking these medications      albuterol 90 mcg/actuation inhaler  Commonly known as: VENTOLIN HFA  Inhale 2 puffs into the lungs every 6 (six) hours as needed for Wheezing. Rescue     albuterol-ipratropium 2.5 mg-0.5 mg/3 mL nebulizer solution  Commonly known as: DUO-NEB  Take 3 mLs by nebulization every 6 (six) hours as needed for Wheezing. Rescue     alfuzosin 10 mg Tb24  Commonly known as: UROXATRAL     aluminum-magnesium hydroxide 200-200 mg/5 mL suspension     amiodarone 200 MG Tab  Commonly known as: PACERONE     amLODIPine 10 MG tablet  Commonly known as: NORVASC     aspirin 81 MG EC tablet  Commonly known as: ECOTRIN  Take 1 tablet (81 mg total) by mouth once daily.     atorvastatin 40 MG tablet  Commonly known as: LIPITOR     CENTRUM SILVER --300 mcg Tab  Generic drug: mv-min-folic-K1-lycopen-lutein     clopidogreL 75 mg tablet  Commonly known as: PLAVIX  Take 1 tablet  (75 mg total) by mouth once daily.     dapagliflozin propanediol 10 mg tablet  Commonly known as: Farxiga  Take 1 tablet (10 mg total) by mouth once daily.     famotidine 20 MG tablet  Commonly known as: PEPCID     finasteride 5 mg tablet  Commonly known as: PROSCAR     levothyroxine 75 MCG tablet  Commonly known as: SYNTHROID     pantoprazole 40 MG tablet  Commonly known as: PROTONIX  Take 1 tablet (40 mg total) by mouth once daily.     tamsulosin 0.4 mg Cap  Commonly known as: FLOMAX     TRELEGY ELLIPTA 100-62.5-25 mcg Dsdv  Generic drug: fluticasone-umeclidin-vilanter  Inhale 1 puff into the lungs once daily.     vitamin D 1000 units Tab  Commonly known as: VITAMIN D3            STOP taking these medications      loratadine 10 mg tablet  Commonly known as: CLARITIN     methIMAzole 10 MG Tab  Commonly known as: TAPAZOLE     pravastatin 20 MG tablet  Commonly known as: PRAVACHOL     propranoloL 80 MG tablet  Commonly known as: INDERAL     tadalafiL 5 MG tablet  Commonly known as: CIALIS               Where to Get Your Medications        These medications were sent to [facility] [address]  [address], [facility] [place] [address]      Phone: [phone]   azithromycin 500 MG tablet  cefpodoxime 100 MG tablet  furosemide 40 MG tablet  pantoprazole 40 MG tablet  predniSONE 20 MG tablet          Explained in detail to the patient about the discharge plan, medications, and follow-up visits. Pt understands and agrees with the treatment plan  Discharge Disposition: home  Discharged Condition: stable  Diet-   Dietary Orders (From admission, onward)       Start     Ordered    07/11/25 0255  Diet Heart Healthy Standard Tray  Diet effective now        Question:  Tray type:  Answer:  Standard Tray    07/11/25 0254                   Medications Per DC med rec  Activities as tolerated   Follow-up Information       Guillermo Brown Jr., MD. Call in 1 week(s).    Specialty:  Family Medicine  Contact information:  [address]  [address]  [facility] [address]  [phone]               Obey Black MD. Call in 1 week(s).    Specialty: Pulmonary Disease  Contact information:  [address]  [address]  [place] [address]  [phone]               Liam Gil MD. Call in 1 week(s).    Specialties: Cardiovascular Disease, Cardiology  Contact information:  [address] [address]  [phone]               Jono Rutherford MD. Call in 1 week(s).    Specialties: Cardiology, Pathology  Contact information:  [address]  [facility] [address]  [phone]                           For further questions contact hospitalist office    Discharge time 75 minutes    For worsening symptoms, chest pain, shortness of breath, increased abdominal pain, high grade fever, stroke or stroke like symptoms, immediately go to the nearest Emergency Room or call 911 as soon as possible.      Yehuda Babb M.D, on 7/14/2025. at 11:54 AM.

## 2025-07-14 NOTE — PLAN OF CARE
07/14/25 1206   Final Note   Assessment Type Final Discharge Note   Anticipated Discharge Disposition Home   Hospital Resources/Appts/Education Provided Appointments scheduled and added to AVS   Post-Acute Status   Patient choice form signed by patient/caregiver List with quality metrics by geographic area provided   Discharge Delays None known at this time     The patient will be discharged from ([facility]) to his private residence with self-care. There are no (CM) needs at present. The patient was made aware of the above discharge plans and he is in agreement. Transportation will be arranged by his family's personal vehicle.

## 2025-07-14 NOTE — DISCHARGE INSTRUCTIONS
Our goal at [facility] is to always give you outstanding care and exceptional service. You may receive a survey from [employer] by mail, text or e-mail in the next 24-48 hours asking about the care you received with us. The survey should only take 5-10 minutes to complete and is very important to us.     Your feedback provides us with a way to recognize our staff who work tirelessly to provide the best care! Also, your responses help us learn how to improve when your experience was below our aspiration of excellence. We are always looking for ways to improve your stay. We WILL use your feedback to continue making improvements to help us provide the highest quality care. We keep your personal information and feedback confidential. We appreciate your time completing this survey and can't wait to hear from you!!!    We look forward to your continued care with us! Thanks so much for choosing [facility] for your healthcare needs!

## 2025-07-16 LAB
BACTERIA BLD CULT: NORMAL
BACTERIA BLD CULT: NORMAL

## (undated) DEVICE — SUT 2-0 VICRYL / CT-1

## (undated) DEVICE — COVER PROBE US 5.5X58L NON LTX

## (undated) DEVICE — INQWIRE 0.035 GUIDEWIRE

## (undated) DEVICE — CATH ADVISOR HD GRID X SENSOR

## (undated) DEVICE — SYS WATCHMAN FXD CURVE DBL US

## (undated) DEVICE — COVER LIGHT HANDLE

## (undated) DEVICE — Device

## (undated) DEVICE — DRAPE INCISE IOBAN 2 23X17IN

## (undated) DEVICE — CATH BIDIRECTIONAL DF CRV 7FR

## (undated) DEVICE — SUT CTD VICRYL PLUS 4/0

## (undated) DEVICE — DRESSING TEGADERM 4.4X5IN

## (undated) DEVICE — SHEATH PINNACLE 8FR

## (undated) DEVICE — SHEATH BRITE TIP INTRO 11CM 9F

## (undated) DEVICE — CATH IMPULSE 5FR PIGTAIL 125CM

## (undated) DEVICE — ELECTRODE REM PLYHSV RETURN 9

## (undated) DEVICE — PAD DEFIB RADIOLUCENT ADULT

## (undated) DEVICE — ADAPTER DBL MALE LL CLR POLY

## (undated) DEVICE — CATH VIEWFLEX XTRA ICE 9F 90CM

## (undated) DEVICE — PACK PACER PERMANENT

## (undated) DEVICE — FLUID DELIVERY SET WITH FILTER

## (undated) DEVICE — SUT ETHBND XTRA 1 OS-8 30IN

## (undated) DEVICE — FORCEP HEMOSTAT MOSQUITO STR

## (undated) DEVICE — KIT VERSACROSS TRANSSEPTAL

## (undated) DEVICE — NAMIC CONVENIENCE KIT

## (undated) DEVICE — SET TUBING COOL POINT IRR

## (undated) DEVICE — SHEATH VERSACROSS 0.035IN

## (undated) DEVICE — CONTINOUS SQUEEZE FLUSH DEVICE

## (undated) DEVICE — ILLUMINATOR PHOTONBLADE 8/11IN

## (undated) DEVICE — CLOSURE SKIN STERI STRIP 1/2X4

## (undated) DEVICE — TRAY CENT PREM SUT REM PK SGL

## (undated) DEVICE — SHEATH 10FR 23CM BRITE TIP

## (undated) DEVICE — BENZOIN TINCTURE 0.66ML

## (undated) DEVICE — DRESSING TRANS 4X4 TEGADERM

## (undated) DEVICE — KIT ENSITE ELECTRODE SURFACE

## (undated) DEVICE — CATH TACTFLEX SE BID CURVE D-F